# Patient Record
Sex: FEMALE | Race: WHITE | NOT HISPANIC OR LATINO | Employment: OTHER | URBAN - METROPOLITAN AREA
[De-identification: names, ages, dates, MRNs, and addresses within clinical notes are randomized per-mention and may not be internally consistent; named-entity substitution may affect disease eponyms.]

---

## 2017-01-10 ENCOUNTER — CONVERSION ENCOUNTER (OUTPATIENT)
Dept: MAMMOGRAPHY | Facility: HOSPITAL | Age: 72
End: 2017-01-10

## 2018-01-04 ENCOUNTER — OFFICE VISIT CONVERTED (OUTPATIENT)
Dept: FAMILY MEDICINE CLINIC | Facility: CLINIC | Age: 73
End: 2018-01-04
Attending: FAMILY MEDICINE

## 2018-01-04 ENCOUNTER — CONVERSION ENCOUNTER (OUTPATIENT)
Dept: FAMILY MEDICINE CLINIC | Facility: CLINIC | Age: 73
End: 2018-01-04

## 2018-03-30 ENCOUNTER — OFFICE VISIT CONVERTED (OUTPATIENT)
Dept: CARDIOLOGY | Facility: CLINIC | Age: 73
End: 2018-03-30
Attending: SPECIALIST

## 2018-04-13 ENCOUNTER — OFFICE VISIT CONVERTED (OUTPATIENT)
Dept: ORTHOPEDIC SURGERY | Facility: CLINIC | Age: 73
End: 2018-04-13
Attending: PHYSICIAN ASSISTANT

## 2018-04-20 ENCOUNTER — OFFICE VISIT CONVERTED (OUTPATIENT)
Dept: FAMILY MEDICINE CLINIC | Facility: CLINIC | Age: 73
End: 2018-04-20
Attending: FAMILY MEDICINE

## 2018-04-20 ENCOUNTER — CONVERSION ENCOUNTER (OUTPATIENT)
Dept: FAMILY MEDICINE CLINIC | Facility: CLINIC | Age: 73
End: 2018-04-20

## 2018-05-04 ENCOUNTER — OFFICE VISIT CONVERTED (OUTPATIENT)
Dept: FAMILY MEDICINE CLINIC | Facility: CLINIC | Age: 73
End: 2018-05-04
Attending: FAMILY MEDICINE

## 2018-07-18 ENCOUNTER — OFFICE VISIT CONVERTED (OUTPATIENT)
Dept: ORTHOPEDIC SURGERY | Facility: CLINIC | Age: 73
End: 2018-07-18
Attending: PHYSICIAN ASSISTANT

## 2018-08-06 ENCOUNTER — OFFICE VISIT CONVERTED (OUTPATIENT)
Dept: FAMILY MEDICINE CLINIC | Facility: CLINIC | Age: 73
End: 2018-08-06
Attending: FAMILY MEDICINE

## 2018-08-17 ENCOUNTER — CONVERSION ENCOUNTER (OUTPATIENT)
Dept: MAMMOGRAPHY | Facility: HOSPITAL | Age: 73
End: 2018-08-17

## 2018-09-24 ENCOUNTER — OFFICE VISIT (OUTPATIENT)
Dept: NEUROSURGERY | Facility: CLINIC | Age: 73
End: 2018-09-24

## 2018-09-24 VITALS
BODY MASS INDEX: 24.27 KG/M2 | HEIGHT: 63 IN | SYSTOLIC BLOOD PRESSURE: 132 MMHG | HEART RATE: 85 BPM | WEIGHT: 137 LBS | RESPIRATION RATE: 18 BRPM | DIASTOLIC BLOOD PRESSURE: 84 MMHG

## 2018-09-24 DIAGNOSIS — M53.3 PAIN OF RIGHT SACROILIAC JOINT: Primary | ICD-10-CM

## 2018-09-24 DIAGNOSIS — M51.36 DEGENERATIVE DISC DISEASE, LUMBAR: ICD-10-CM

## 2018-09-24 PROCEDURE — 99204 OFFICE O/P NEW MOD 45 MIN: CPT | Performed by: NURSE PRACTITIONER

## 2018-09-24 RX ORDER — DIAZEPAM 2 MG/1
TABLET ORAL
COMMUNITY
Start: 2018-08-23 | End: 2018-10-29

## 2018-09-24 RX ORDER — MULTIPLE VITAMINS W/ MINERALS TAB 9MG-400MCG
1 TAB ORAL DAILY
COMMUNITY
End: 2019-05-07 | Stop reason: HOSPADM

## 2018-09-24 RX ORDER — SIMVASTATIN 20 MG
20 TABLET ORAL NIGHTLY
COMMUNITY
Start: 2018-08-24

## 2018-09-24 RX ORDER — MELOXICAM 15 MG/1
15 TABLET ORAL DAILY
Qty: 30 TABLET | Refills: 2 | Status: SHIPPED | OUTPATIENT
Start: 2018-09-24 | End: 2019-03-21

## 2018-09-24 NOTE — PROGRESS NOTES
Subjective   Patient ID: Maureen Hernandez is a 73 y.o. female is being seen for consultation today at the request of Renu Alexis MD for back pain. Patient presents accompanied by her daughter.     History of Present Illness  Patient has history of prior lumbar fusion L2-S1 in [place] 2008. She presents for evaluation and treatment recommendations of right low back pain. She has had gradual onset of 4-5 years of right hip/low back pain. It has progressively worsened. It is a dull ache pain at all times with sharp stabbing pain intermittently. It gives her difficulty sleeping. Sitting is OK. She is using a crutch to keep weight off the leg. The pain is aggravated by walking and prolonged standing. She has pain that wraps around the groin and anterior thigh. It is not associated with numbness, weakness in leg especially in the last few months. Back is worst with activity but hip pain is worst with sleeping. She has chronic difficulty emptying her bladder. No recent PT. Hip injections with some benefit. No NSAIDs    The following portions of the patient's history were reviewed and updated as appropriate: allergies, current medications, past family history, past medical history, past social history, past surgical history and problem list.    Review of Systems   Constitutional: Negative for chills and fever.   HENT: Positive for tinnitus (bilateral ears, unchanged).    Eyes: Negative for visual disturbance.   Respiratory: Negative for cough, shortness of breath and wheezing.    Cardiovascular: Negative for chest pain and palpitations.   Gastrointestinal: Negative for abdominal pain, nausea and vomiting.   Genitourinary: Positive for difficulty urinating and urgency. Negative for enuresis.   Musculoskeletal: Positive for back pain and gait problem (many near falls).        Right hip pain, occ'l RLE pain   Skin: Negative for rash.   Neurological: Positive for tremors (right side, d/t palsy) and weakness (RLE). Negative  for numbness.   Psychiatric/Behavioral: Positive for sleep disturbance (only sleeps ~4 hours per night).       Objective   Physical Exam   Constitutional: She is oriented to person, place, and time. She appears well-developed and well-nourished.   Body mass index is 24.27 kg/m².     Pulmonary/Chest: Effort normal.   Musculoskeletal:        Right hip: She exhibits decreased range of motion and tenderness.        Left hip: She exhibits decreased range of motion. She exhibits no tenderness.        Lumbar back: She exhibits decreased range of motion (diffuse), tenderness (SI joint tenderness R>L) and pain (movement of right hip).   SI joint testing:  Positive for Taj, Gaenslen, compression, thigh thrust, Phyllis finger   Neurological: She is alert and oriented to person, place, and time. She has a normal Romberg Test.   Reflex Scores:       Patellar reflexes are 2+ on the right side and 2+ on the left side.       Achilles reflexes are 1+ on the right side and 1+ on the left side.  Vitals reviewed.    Neurologic Exam     Mental Status   Oriented to person, place, and time.   Level of consciousness: alert  Knowledge: good.     Motor Exam   Muscle bulk: normal    Strength   Strength 5/5 except as noted.   Right iliopsoas: 4/5    Sensory Exam   Right leg light touch: normal  Left leg light touch: normal  Right leg vibration: normal  Left leg vibration: normal  Temperature intact cold bilateral lower extremities     Gait, Coordination, and Reflexes     Gait  Gait: (antalgic; uses cane for stability)    Coordination   Romberg: negative    Reflexes   Right patellar: 2+  Left patellar: 2+  Right achilles: 1+  Left achilles: 1+  Able to heel and toe walk       Assessment/Plan   Independent Review of Radiographic Studies:    Lumbar and thoracic xrays 9/5/18- PO changes L2-S1 with instrumentation in good position and no evidence of hardware failure.  No lucency around screws.  Solid bony fusion laterally seen.  There are  degenerative changes at the L1/2 level.  Degenerative changes in the right SI joint    Hip xrays- 7/18/18- mild degenerative changes right hip joint    Medical Decision Making:    Patient presents for evaluation of right low back, hip, anterior thigh pain.  His been progressive and worse in the last few months.  It's giving her difficulty walking and sleeping.  She is not using a cane.  She has a sense of weakness.  She was been evaluated by orthopedic surgery.     Her exam is as noted above.  Antalgic gait, pain limiting strength in the right hip flexor otherwise normal.  Able to heel and toe walk.  Sensation intact.  X-rays show postoperative changes at L2 through S1.  There is degenerative changes at adjacent level of L1/2.  Mild degenerative changes in the right hip.  There are degenerative changes in the right SI joint as well.  SI joint testing positive for Taj, Gaenslen, compression, thigh thrust, Phyllis finger.    Patient appears to have hip versus SI joint pathology.  I do not think this is related to her adjacent level disease at L1/2 as that is a little higher than this level.  I recommended trial of NSAIDs and some physical therapy.  She has had problems with steroids giving her hyperglycemia.  She would probably benefit from an SI joint injection but we'll wait on this.  We'll see how she does with the conservative plan and consider MR imaging +/- SI joint injection at the next office visit if she has failed to improve.    Plan: Mobic trial and physical therapy.  Return to office one month.    Maureen was seen today for back pain.    Diagnoses and all orders for this visit:    Pain of right sacroiliac joint  -     Ambulatory Referral to Physical Therapy Evaluate and treat    Degenerative disc disease, lumbar  -     Ambulatory Referral to Physical Therapy Evaluate and treat    Other orders  -     meloxicam (MOBIC) 15 MG tablet; Take 1 tablet by mouth Daily.      Return in about 1 month (around  10/24/2018).

## 2018-10-11 ENCOUNTER — OFFICE VISIT CONVERTED (OUTPATIENT)
Dept: FAMILY MEDICINE CLINIC | Facility: CLINIC | Age: 73
End: 2018-10-11
Attending: FAMILY MEDICINE

## 2018-10-29 ENCOUNTER — OFFICE VISIT (OUTPATIENT)
Dept: NEUROSURGERY | Facility: CLINIC | Age: 73
End: 2018-10-29

## 2018-10-29 VITALS
HEART RATE: 87 BPM | BODY MASS INDEX: 25.16 KG/M2 | HEIGHT: 63 IN | DIASTOLIC BLOOD PRESSURE: 64 MMHG | RESPIRATION RATE: 18 BRPM | SYSTOLIC BLOOD PRESSURE: 120 MMHG | WEIGHT: 142 LBS

## 2018-10-29 DIAGNOSIS — M51.36 DEGENERATIVE DISC DISEASE, LUMBAR: ICD-10-CM

## 2018-10-29 DIAGNOSIS — M53.3 PAIN OF RIGHT SACROILIAC JOINT: Primary | ICD-10-CM

## 2018-10-29 PROCEDURE — 99213 OFFICE O/P EST LOW 20 MIN: CPT | Performed by: NURSE PRACTITIONER

## 2018-10-29 RX ORDER — GLIMEPIRIDE 1 MG/1
1 TABLET ORAL NIGHTLY
Status: ON HOLD | COMMUNITY
Start: 2018-10-11 | End: 2019-05-07 | Stop reason: SDUPTHER

## 2018-10-29 NOTE — PROGRESS NOTES
"Subjective   Patient ID: Maureen Hernandez is a 73 y.o. female is here today for follow-up back pain. Patient presents accompanied by her daughter.     History of Present Illness    Returns to the office today for follow-up of low back pain.  She was given a trial of no back and was referred to physical therapy at her last office visit.  She is a history of prior lumbar fusion at L2 through S1 and [place] in 2008.  She has had right-sided low back pain for the past 5 years.  She had lumbar and thoracic x-rays from September 2018 revealed postoperative changes as well as degenerative changes in the right SI joint.    Today, she reports that she has had a market improvement with the Mobic and PT.  She has also started off with therapy and she feels that this is helping.  Her pain is now to a 3 out of 10 on average, though still increases quickly with prolonged standing.  Walking does help.  She is trying to be more active.  She denies any new problems. She would like to refrain from surgery and any injections for as long as possible.  The cortisone injections increase her blood sugars to the point where they become hard to manage.    She presents with her daughter, who is also an established patient.        /64 (BP Location: Right arm, Patient Position: Sitting)   Pulse 87   Resp 18   Ht 160 cm (63\")   Wt 64.4 kg (142 lb)   BMI 25.15 kg/m²     The following portions of the patient's history were reviewed and updated as appropriate: allergies, current medications, past family history, past medical history, past social history, past surgical history and problem list.    Review of Systems   Genitourinary: Positive for enuresis (unchanged). Negative for difficulty urinating.   Musculoskeletal: Positive for back pain.        BLE pain   Neurological: Negative for weakness and numbness.   Psychiatric/Behavioral: Positive for sleep disturbance (improving).       Objective   Physical Exam   Constitutional: She is " oriented to person, place, and time. Vital signs are normal. She appears well-developed and well-nourished. She is cooperative.  Non-toxic appearance. She does not have a sickly appearance. She does not appear ill.   HENT:   Head: Normocephalic and atraumatic.   Eyes: Pupils are equal, round, and reactive to light. EOM are normal.   Corrective lenses   Neck: Neck supple. No tracheal deviation present.   Cardiovascular: Intact distal pulses.    Pulmonary/Chest: Effort normal.   Abdominal: Soft.   Musculoskeletal: She exhibits tenderness (Very tender to minimal palpation right SI joint). She exhibits no edema or deformity.        Lumbar back: She exhibits decreased range of motion, tenderness, bony tenderness and pain.   Decreased lumbar range of motion with extension secondary to pain  Strength equal bilateral lower extremities   Neurological: She is alert and oriented to person, place, and time. She has normal strength. She displays no tremor and normal reflexes. No cranial nerve deficit or sensory deficit. She exhibits normal muscle tone. Gait abnormal. Coordination normal. GCS eye subscore is 4. GCS verbal subscore is 5. GCS motor subscore is 6.   Reflex Scores:       Patellar reflexes are 2+ on the right side and 2+ on the left side.       Achilles reflexes are 2+ on the right side and 2+ on the left side.  Gait is slow, purposeful and antalgic on the right, outward deviation of the right foot  Sensory intact bilateral lower extremities   Skin: Skin is warm and dry.   Psychiatric: She has a normal mood and affect. Her behavior is normal. Thought content normal.   Vitals reviewed.    Neurologic Exam     Mental Status   Oriented to person, place, and time.     Cranial Nerves     CN III, IV, VI   Pupils are equal, round, and reactive to light.  Extraocular motions are normal.     Motor Exam     Strength   Strength 5/5 throughout.     Gait, Coordination, and Reflexes     Reflexes   Right patellar: 2+  Left patellar:  2+  Right achilles: 2+  Left achilles: 2+      Assessment/Plan   Independent Review of Radiographic Studies:    Physical therapy notes from [facility] reviewed      Medical Decision Making:    She returns the office today for ongoing follow-up of chronic low back and right SI joint pain.  Exam as noted above, no red flags.  She has had pretty significant improvement with physical therapy and meloxicam.  She is hopeful that this will continue to improve.  She is aware that being active is important with underlying arthritic and inflammatory pain.  I've encouraged her to walk and move as much as possible.  She is beginning aqua therapy and feels that this is very helpful.  Given her improvement, we will hold off on referral to pain management, any additional imaging and certainly any surgical discussion at this time.  As noted above, she is hesitant to get additional injections due to elevated blood sugars.  She is to call our office if her right SI joint pain worsens.  We will need additional imaging with an MRI at that time.    She will have her primary care provider take over meloxicam prescription.    Plan: Return to office as needed  Maureen was seen today for back pain.    Diagnoses and all orders for this visit:    Pain of right sacroiliac joint    Degenerative disc disease, lumbar      Return if symptoms worsen or fail to improve.

## 2018-11-06 ENCOUNTER — OFFICE VISIT CONVERTED (OUTPATIENT)
Dept: FAMILY MEDICINE CLINIC | Facility: CLINIC | Age: 73
End: 2018-11-06
Attending: FAMILY MEDICINE

## 2018-11-06 ENCOUNTER — CONVERSION ENCOUNTER (OUTPATIENT)
Dept: FAMILY MEDICINE CLINIC | Facility: CLINIC | Age: 73
End: 2018-11-06

## 2019-01-25 ENCOUNTER — HOSPITAL ENCOUNTER (OUTPATIENT)
Dept: FAMILY MEDICINE CLINIC | Facility: CLINIC | Age: 74
Discharge: HOME | End: 2019-01-25
Attending: FAMILY MEDICINE

## 2019-01-25 ENCOUNTER — OFFICE VISIT CONVERTED (OUTPATIENT)
Dept: FAMILY MEDICINE CLINIC | Facility: CLINIC | Age: 74
End: 2019-01-25
Attending: FAMILY MEDICINE

## 2019-01-25 LAB
ALBUMIN SERPL-MCNC: 4 G/DL (ref 3.5–5)
ALBUMIN/GLOB SERPL: 1.3 {RATIO} (ref 1.4–2.6)
ALP SERPL-CCNC: 59 U/L (ref 43–160)
ALT SERPL-CCNC: 21 U/L (ref 10–40)
ANION GAP SERPL CALC-SCNC: 22 MMOL/L (ref 8–19)
AST SERPL-CCNC: 18 U/L (ref 15–50)
BASOPHILS # BLD AUTO: 0.07 10*3/UL (ref 0–0.2)
BASOPHILS NFR BLD AUTO: 0.8 % (ref 0–3)
BILIRUB SERPL-MCNC: 0.23 MG/DL (ref 0.2–1.3)
BUN SERPL-MCNC: 25 MG/DL (ref 5–25)
BUN/CREAT SERPL: 26 {RATIO} (ref 6–20)
CALCIUM SERPL-MCNC: 10.3 MG/DL (ref 8.7–10.4)
CHLORIDE SERPL-SCNC: 100 MMOL/L (ref 99–111)
CONV CO2: 19 MMOL/L (ref 22–32)
CONV CREATININE URINE, RANDOM: 75.8 MG/DL (ref 10–300)
CONV MICROALBUM.,U,RANDOM: <12 MG/L (ref 0–20)
CONV TOTAL PROTEIN: 7.1 G/DL (ref 6.3–8.2)
CREAT UR-MCNC: 0.95 MG/DL (ref 0.5–0.9)
EOSINOPHIL # BLD AUTO: 0.08 10*3/UL (ref 0–0.7)
EOSINOPHIL # BLD AUTO: 0.97 % (ref 0–7)
ERYTHROCYTE [DISTWIDTH] IN BLOOD BY AUTOMATED COUNT: 11.4 % (ref 11.5–14.5)
EST. AVERAGE GLUCOSE BLD GHB EST-MCNC: 186 MG/DL
GFR SERPLBLD BASED ON 1.73 SQ M-ARVRAT: 60 ML/MIN/{1.73_M2}
GLOBULIN UR ELPH-MCNC: 3.1 G/DL (ref 2–3.5)
GLUCOSE SERPL-MCNC: 159 MG/DL (ref 65–99)
HBA1C MFR BLD: 14.6 G/DL (ref 12–16)
HBA1C MFR BLD: 8.1 % (ref 3.5–5.7)
HCT VFR BLD AUTO: 41.1 % (ref 37–47)
LYMPHOCYTES # BLD AUTO: 2.21 10*3/UL (ref 1–5)
MCH RBC QN AUTO: 33.2 PG (ref 27–31)
MCHC RBC AUTO-ENTMCNC: 35.6 G/DL (ref 33–37)
MCV RBC AUTO: 93.4 FL (ref 81–99)
MICROALBUMIN/CREAT UR: 15.8 MG/G{CRE} (ref 0–35)
MONOCYTES # BLD AUTO: 0.49 10*3/UL (ref 0.2–1.2)
MONOCYTES NFR BLD AUTO: 5.91 % (ref 3–10)
NEUTROPHILS # BLD AUTO: 5.48 10*3/UL (ref 2–8)
NEUTROPHILS NFR BLD AUTO: 65.8 % (ref 30–85)
NRBC BLD AUTO-RTO: 0 % (ref 0–0.01)
OSMOLALITY SERPL CALC.SUM OF ELEC: 290 MOSM/KG (ref 273–304)
PLATELET # BLD AUTO: 304 10*3/UL (ref 130–400)
PMV BLD AUTO: 7.7 FL (ref 7.4–10.4)
POTASSIUM SERPL-SCNC: 4.5 MMOL/L (ref 3.5–5.3)
RBC # BLD AUTO: 4.4 10*6/UL (ref 4.2–5.4)
SODIUM SERPL-SCNC: 136 MMOL/L (ref 135–147)
VARIANT LYMPHS NFR BLD MANUAL: 26.6 % (ref 20–45)
WBC # BLD AUTO: 8.32 10*3/UL (ref 4.8–10.8)

## 2019-01-30 ENCOUNTER — HOSPITAL ENCOUNTER (OUTPATIENT)
Dept: MRI IMAGING | Facility: HOSPITAL | Age: 74
Discharge: HOME | End: 2019-01-30
Attending: FAMILY MEDICINE

## 2019-03-08 ENCOUNTER — HOSPITAL ENCOUNTER (OUTPATIENT)
Dept: MAMMOGRAPHY | Facility: HOSPITAL | Age: 74
Discharge: HOME | End: 2019-03-08
Attending: FAMILY MEDICINE

## 2019-03-15 ENCOUNTER — HOSPITAL ENCOUNTER (OUTPATIENT)
Dept: FAMILY MEDICINE CLINIC | Facility: CLINIC | Age: 74
Discharge: HOME | End: 2019-03-15
Attending: FAMILY MEDICINE

## 2019-03-15 ENCOUNTER — OFFICE VISIT CONVERTED (OUTPATIENT)
Dept: FAMILY MEDICINE CLINIC | Facility: CLINIC | Age: 74
End: 2019-03-15
Attending: FAMILY MEDICINE

## 2019-03-15 ENCOUNTER — CONVERSION ENCOUNTER (OUTPATIENT)
Dept: FAMILY MEDICINE CLINIC | Facility: CLINIC | Age: 74
End: 2019-03-15

## 2019-03-17 LAB
AMOXICILLIN+CLAV SUSC ISLT: <=2
AMPICILLIN SUSC ISLT: >=32
AMPICILLIN+SULBAC SUSC ISLT: 8
BACTERIA UR CULT: ABNORMAL
CEFAZOLIN SUSC ISLT: <=4
CEFEPIME SUSC ISLT: <=1
CEFTAZIDIME SUSC ISLT: <=1
CEFTRIAXONE SUSC ISLT: <=1
CEFUROXIME ORAL SUSC ISLT: 2
CEFUROXIME PARENTER SUSC ISLT: 2
CIPROFLOXACIN SUSC ISLT: <=0.25
ERTAPENEM SUSC ISLT: <=0.5
GENTAMICIN SUSC ISLT: <=1
LEVOFLOXACIN SUSC ISLT: <=0.12
NITROFURANTOIN SUSC ISLT: 128
TETRACYCLINE SUSC ISLT: <=1
TMP SMX SUSC ISLT: <=20
TOBRAMYCIN SUSC ISLT: <=1

## 2019-03-21 ENCOUNTER — OFFICE VISIT (OUTPATIENT)
Dept: ORTHOPEDIC SURGERY | Facility: CLINIC | Age: 74
End: 2019-03-21

## 2019-03-21 VITALS — BODY MASS INDEX: 25.62 KG/M2 | WEIGHT: 144.6 LBS | TEMPERATURE: 99.7 F | HEIGHT: 63 IN

## 2019-03-21 DIAGNOSIS — M25.551 HIP PAIN, RIGHT: Primary | ICD-10-CM

## 2019-03-21 DIAGNOSIS — M16.11 PRIMARY OSTEOARTHRITIS OF RIGHT HIP: ICD-10-CM

## 2019-03-21 PROCEDURE — 73502 X-RAY EXAM HIP UNI 2-3 VIEWS: CPT | Performed by: ORTHOPAEDIC SURGERY

## 2019-03-21 PROCEDURE — 99204 OFFICE O/P NEW MOD 45 MIN: CPT | Performed by: ORTHOPAEDIC SURGERY

## 2019-03-21 RX ORDER — CEPHALEXIN 500 MG/1
CAPSULE ORAL
COMMUNITY
Start: 2019-03-15 | End: 2019-04-25

## 2019-03-21 RX ORDER — GABAPENTIN 100 MG/1
100 CAPSULE ORAL 3 TIMES DAILY
COMMUNITY
Start: 2019-02-09 | End: 2023-03-27 | Stop reason: SDUPTHER

## 2019-03-21 RX ORDER — VANCOMYCIN HYDROCHLORIDE 1 G/200ML
15 INJECTION, SOLUTION INTRAVENOUS ONCE
Status: CANCELLED | OUTPATIENT
Start: 2019-05-06 | End: 2019-03-21

## 2019-03-21 RX ORDER — ZOLPIDEM TARTRATE 10 MG/1
10 TABLET ORAL NIGHTLY
COMMUNITY
Start: 2019-03-15 | End: 2020-05-07

## 2019-03-21 RX ORDER — MELOXICAM 15 MG/1
15 TABLET ORAL ONCE
Status: CANCELLED | OUTPATIENT
Start: 2019-05-06 | End: 2019-03-21

## 2019-03-21 RX ORDER — TRAMADOL HYDROCHLORIDE 50 MG/1
50 TABLET ORAL EVERY 6 HOURS PRN
Status: ON HOLD | COMMUNITY
Start: 2019-03-15 | End: 2019-05-06 | Stop reason: SDUPTHER

## 2019-03-21 RX ORDER — PREGABALIN 75 MG/1
150 CAPSULE ORAL ONCE
Status: CANCELLED | OUTPATIENT
Start: 2019-05-06 | End: 2019-03-21

## 2019-03-21 RX ORDER — CEFAZOLIN SODIUM 2 G/100ML
2 INJECTION, SOLUTION INTRAVENOUS ONCE
Status: CANCELLED | OUTPATIENT
Start: 2019-05-06 | End: 2019-03-21

## 2019-03-21 NOTE — PROGRESS NOTES
"Patient: Maureen Hernandez  YOB: 1945 74 y.o. female  Medical Record Number: [id number]    Chief Complaints:   Chief Complaint   Patient presents with   • Right Hip - Establish Care, Pain       History of Present Illness:Maureen Hernandez is a 74 y.o. female who presents with complaints of severe constant right hip pain.  It has been present for 3 years and has markedly progressed to the point where she cannot perform basic activities of daily living.  She has a stabbing aching pain with any weightbearing.  She has trouble sleeping.  She has a history of lumbar spinal fusion and has been on narcotics but is recently weaned off of them.  The pain is still severe and constant    Allergies:   Allergies   Allergen Reactions   • Codeine Itching       Medications:   Current Outpatient Medications   Medication Sig Dispense Refill   • CBD (cannabidiol) oral oil Take  by mouth.     • cephalexin (KEFLEX) 500 MG capsule      • ciclopirox (PENLAC) 8 % solution      • gabapentin (NEURONTIN) 100 MG capsule      • glimepiride (AMARYL) 1 MG tablet      • metFORMIN (GLUCOPHAGE) 500 MG tablet      • Multiple Vitamins-Minerals (MULTIVITAMIN WITH MINERALS) tablet tablet Take 1 tablet by mouth Daily.     • simvastatin (ZOCOR) 20 MG tablet      • traMADol (ULTRAM) 50 MG tablet      • zolpidem (AMBIEN) 10 MG tablet        No current facility-administered medications for this visit.          The following portions of the patient's history were reviewed and updated as appropriate: allergies, current medications, past family history, past medical history, past social history, past surgical history and problem list.    Review of Systems:   A 14 point review of systems was performed. All systems negative except pertinent positives/negative listed in HPI above    Physical Exam:   Vitals:    03/21/19 1316   Temp: 99.7 °F (37.6 °C)   TempSrc: Temporal   Weight: 65.6 kg (144 lb 9.6 oz)   Height: 160 cm (63\")       General: A and O x 3, " ASA, NAD    SCLERA:    Normal    DENTITION:   Normal   Hip:  right    LEG ALIGNMENT:     Neutral        LEG LENGTH DISCREPANCY   :    right longer by 1 cm (pelvic obliquity)    GAIT:     Antalgic    SKIN:     No abnormality    RANGE OF MOTION:     Limited by joint irritability    STRENGTH:     Limited by joint irratibility    DISTAL PULSES:    Paplable    DISTAL SENSATION :   Intact    LYMPHATICS:     No   lymphadenopathy    OTHER:          +   Stinchfeld test      -    log roll      -   Tenderness to palpation trochanteric bursa       Radiology:  Xrays 2views right hip  (AP bilateral hips, and lateral of the hip) ordered and reviewed for evaluation of hip pain  demonstrating  advanced, end-satge osteoarthritis with bone on bone articluation, periarticular osteophytes, and subchondral cysts. There are no previous films for comparision.    Assessment/Plan: Right hip advanced OA.   Continuation of conservative management vs. BARBARA discussed.  The patient wishes to proceed with total hip replacement.  At this point the patient has failed the full gamut of conservative treatment and stating complete understanding of the risks/benefits/ anternatives wishes to proceed with surgical treatment.    Risk and benefits of surgery were reviewed.  Including, but not limited to, blood clots, anesthesia risk, infection, leg length discrepancy, fracture, skin/leg numbness, failure of the implant, need for future surgeries, continued pain, hematoma, need for transfusion, and death, among others.  The patient understands and wishes to proceed.     The spectrum of treatment options were discussed with the patient in detail including both the nonoperative and operative treatment modalities and their respective risks and benefits.  After thorough discussion, the patient has elected to undergo surgical treatment.  The details of the surgical procedure were explained including the location of probable incisions and a description of the  likely implants to be used.  Models and diagrams were used as educational resources. The patient understands the likely convalescence after surgery, as well as the rehabilitation required.  We thoroughly discussed the risks, benefits, and alternatives to surgery.  The risks include but are not limited to the risk of infection, joint stiffness, blood clots (including DVT and/or pulmonary embolus along with the risk of death), neurologic and/or vascular injury, fracture, dislocation, nonunion, malunion, need for further surgery including hardware failure requiring revision, and continued pain.  It was explained that if tissue has been repaired or reconstructed, there is also a chance of failure which may require further management.  Following the completion of the discussion, the patient expressed understanding of this planned course of care, all their questions were answered and consent will be obtained preoperatively.    Operative Plan: Anterior approach Total Hip Replacement an overnight staywith home health rehab          Estevan Sharma MD  3/21/2019

## 2019-03-26 ENCOUNTER — TELEPHONE (OUTPATIENT)
Dept: ORTHOPEDIC SURGERY | Facility: CLINIC | Age: 74
End: 2019-03-26

## 2019-03-26 NOTE — TELEPHONE ENCOUNTER
Patient is going to have hip surgery and wants to know what A1 C level is considered too high that RBB would postpone surgery?

## 2019-03-29 PROBLEM — M16.11 PRIMARY OSTEOARTHRITIS OF RIGHT HIP: Status: ACTIVE | Noted: 2019-03-29

## 2019-04-25 ENCOUNTER — APPOINTMENT (OUTPATIENT)
Dept: PREADMISSION TESTING | Facility: HOSPITAL | Age: 74
End: 2019-04-25

## 2019-04-25 VITALS
WEIGHT: 142 LBS | BODY MASS INDEX: 25.16 KG/M2 | RESPIRATION RATE: 16 BRPM | DIASTOLIC BLOOD PRESSURE: 73 MMHG | HEIGHT: 63 IN | SYSTOLIC BLOOD PRESSURE: 127 MMHG | HEART RATE: 67 BPM | TEMPERATURE: 96.9 F | OXYGEN SATURATION: 99 %

## 2019-04-25 DIAGNOSIS — M16.11 PRIMARY OSTEOARTHRITIS OF RIGHT HIP: ICD-10-CM

## 2019-04-25 LAB
ANION GAP SERPL CALCULATED.3IONS-SCNC: 12.5 MMOL/L
BILIRUB UR QL STRIP: NEGATIVE
BUN BLD-MCNC: 22 MG/DL (ref 8–23)
BUN/CREAT SERPL: 23.9 (ref 7–25)
CALCIUM SPEC-SCNC: 9.7 MG/DL (ref 8.6–10.5)
CHLORIDE SERPL-SCNC: 97 MMOL/L (ref 98–107)
CLARITY UR: CLEAR
CO2 SERPL-SCNC: 22.5 MMOL/L (ref 22–29)
COLOR UR: YELLOW
CREAT BLD-MCNC: 0.92 MG/DL (ref 0.57–1)
DEPRECATED RDW RBC AUTO: 43.4 FL (ref 37–54)
ERYTHROCYTE [DISTWIDTH] IN BLOOD BY AUTOMATED COUNT: 12.5 % (ref 12.3–15.4)
GFR SERPL CREATININE-BSD FRML MDRD: 60 ML/MIN/1.73
GLUCOSE BLD-MCNC: 233 MG/DL (ref 65–99)
GLUCOSE UR STRIP-MCNC: NEGATIVE MG/DL
HCT VFR BLD AUTO: 39.4 % (ref 34–46.6)
HGB BLD-MCNC: 13.3 G/DL (ref 12–15.9)
HGB UR QL STRIP.AUTO: NEGATIVE
KETONES UR QL STRIP: ABNORMAL
LEUKOCYTE ESTERASE UR QL STRIP.AUTO: NEGATIVE
MCH RBC QN AUTO: 31.9 PG (ref 26.6–33)
MCHC RBC AUTO-ENTMCNC: 33.8 G/DL (ref 31.5–35.7)
MCV RBC AUTO: 94.5 FL (ref 79–97)
NITRITE UR QL STRIP: NEGATIVE
PH UR STRIP.AUTO: 5.5 [PH] (ref 5–8)
PLATELET # BLD AUTO: 253 10*3/MM3 (ref 140–450)
PMV BLD AUTO: 9.9 FL (ref 6–12)
POTASSIUM BLD-SCNC: 4.3 MMOL/L (ref 3.5–5.2)
PROT UR QL STRIP: NEGATIVE
RBC # BLD AUTO: 4.17 10*6/MM3 (ref 3.77–5.28)
SODIUM BLD-SCNC: 132 MMOL/L (ref 136–145)
SP GR UR STRIP: 1.03 (ref 1–1.03)
UROBILINOGEN UR QL STRIP: ABNORMAL
WBC NRBC COR # BLD: 5.34 10*3/MM3 (ref 3.4–10.8)

## 2019-04-25 PROCEDURE — A9270 NON-COVERED ITEM OR SERVICE: HCPCS | Performed by: ORTHOPAEDIC SURGERY

## 2019-04-25 PROCEDURE — 85027 COMPLETE CBC AUTOMATED: CPT | Performed by: ORTHOPAEDIC SURGERY

## 2019-04-25 PROCEDURE — 36415 COLL VENOUS BLD VENIPUNCTURE: CPT

## 2019-04-25 PROCEDURE — 93010 ELECTROCARDIOGRAM REPORT: CPT | Performed by: INTERNAL MEDICINE

## 2019-04-25 PROCEDURE — 81003 URINALYSIS AUTO W/O SCOPE: CPT | Performed by: ORTHOPAEDIC SURGERY

## 2019-04-25 PROCEDURE — 93005 ELECTROCARDIOGRAM TRACING: CPT

## 2019-04-25 PROCEDURE — 80048 BASIC METABOLIC PNL TOTAL CA: CPT | Performed by: ORTHOPAEDIC SURGERY

## 2019-04-25 PROCEDURE — 63710000001 MUPIROCIN 2 % OINTMENT: Performed by: ORTHOPAEDIC SURGERY

## 2019-04-25 ASSESSMENT — HOOS JR
HOOS JR SCORE: 43.335
HOOS JR SCORE: 15

## 2019-05-02 ENCOUNTER — OFFICE VISIT (OUTPATIENT)
Dept: ORTHOPEDIC SURGERY | Facility: CLINIC | Age: 74
End: 2019-05-02

## 2019-05-02 VITALS
WEIGHT: 139 LBS | BODY MASS INDEX: 24.63 KG/M2 | HEIGHT: 63 IN | TEMPERATURE: 99.6 F | DIASTOLIC BLOOD PRESSURE: 70 MMHG | SYSTOLIC BLOOD PRESSURE: 110 MMHG

## 2019-05-02 DIAGNOSIS — M25.551 HIP PAIN, RIGHT: Primary | ICD-10-CM

## 2019-05-02 PROCEDURE — S0260 H&P FOR SURGERY: HCPCS | Performed by: NURSE PRACTITIONER

## 2019-05-02 NOTE — PROGRESS NOTES
Pre-Operative Orthopedic Assessment      Patient: Maureen Hernandez    YOB: 1945      Age/Gender: 74 y.o. female  Medical Record Number: [id number]  Surgical Procedure Planned: RIGHT TOTAL HIP ARTHROPLASTY ANTERIOR WITH HANA TABLE     Surgeon: Estevan Sharma MD    Date of Surgery Planned: 5/6/19    Question Value Score    Patient Score   1. What is your age group? 50-65 years  66-75 years  >75 years =2  =1  =0 1   2. Gender? Male  Female =2  =1 1   3. How far on average can you walk? (a block is 200 meters) Two blocks or more (+\- rest)  1-2 blocks (+\- rest)  Housebound (most of time) =2  =1  =0 1   4. Which gait aid to you use? (more often than not) None  Single-Point Stick  Crutches/Frame =2  =1  =0 1   5. Do you use community  supports? (home help, meals on wheels, district nursing) None or one per week  Two or more per week =1  =0 1   6. Will you live with someone who can care for you after your operation? Yes  No =3  =0 3      Your Score (out of 12)  8     Key Destination at Discharge from acute care predicted by score   Scores < 6  -- extended inpatient rehabilitation   Scores 6-9 -- additional intervention to discharge directly home (Rehabilitation in home)   Scores > 9 -- directly home         Discharge Disposition/Planning:     Patient Response   Discussed the Predicted discharge disposition needed based on RAPT Assessment with the patient.    Y     Patient selected discharge disposition:   HOME   Out Patient Rehabilitation Facility of Choice:    NONE   Home Health Services Preferred:   [facility]   Post-Operative Care Giver Name:  VANIA CARRION - [phone]     Subacute Inpatient Rehabilitation:  Complete this section only if planning inpatient services at a Subacute Facility     Patient Response   Subacute Facility Preferred (Please list 2 facilities:      Requires pre-certification for inpatient rehabilitation services?         Planned source of transportation to inpatient  rehabilitation facility?       If choosing inpatient services at an Acute or Subacute Facility please list a subsequent back-up plan (in case patient fails to qualify for inpatient rehabilitation). Back-up plans should include caregiver (family member or friend) for first 24-48 post- -operatively.       Home Equipment Patient Response   Does patient have a walker for home use?    Y   Does patient have a 3 in 1 commode for home use?    N   Does patient have a shower chair for home use?    Y   Does patient have an elevated commode seat for home use? Y   Does patient have a cane for home use?    Y   Is there any other medical equipment in the home? If so,  List in comment section below N   Pre-Operative Class Attendance Patient Response   Attended or scheduled to attend the pre-operative class within 1 year of total joint replacement? Y   Patient Education  Completed   Expected time of discharge discussed Y   Encouraged to attend Pre-Operative Class    Y   Education re: Predicted Discharge Disposition based on RAPT score    Y   Patient receptive and voiced understanding of information given    Y                                                                                                            Comments:    3 STEPS INTO HOUSE W/RAIL AND NONE INSIDE.     HAS ALL NEEDED EQUIPMENT.     PLANS TO USE [facility]/PT                                       Signature: Arnaldo Street LPN    Date:  5/2/2019

## 2019-05-02 NOTE — H&P (VIEW-ONLY)
Patient: Maureen Hernandez    Date of Admission: 5/6/19    YOB: 1945    Medical Record Number: [id number]    Admitting Physician: Dr. Estevan Sharma    Reason for Admission: End Stage Right Hip OA    History of Present Illness: 74 y.o. female presents with severe end stage hip osteoarthritis which has not been responsive to the full compliment of conservative measures. Despite conservative attempts, there is still severe, constant activity limiting hip pain. Given the severity of the pain, the patient has elected to proceed with hip replacement.    Allergies:   Allergies   Allergen Reactions   • Codeine Itching         Current Medications:  Scheduled Meds:  PRN Meds:.    PMH:  Past Medical History:   Diagnosis Date   • Arthritis    • DDD (degenerative disc disease), lumbar    • Diabetes (CMS/HCC)    • GERD (gastroesophageal reflux disease)    • Hyperlipidemia    • Palsy (CMS/HCC)    • Poor circulation    • Sleep apnea     NO CPAP USED   • Stress incontinence         PSURGH:  Past Surgical History:   Procedure Laterality Date   • CHOLECYSTECTOMY  1998   • HYSTERECTOMY     • KNEE SURGERY      meniscus   • KNEE SURGERY      meniscus   • LUMBAR FUSION  2013    Dr. Ibrahim       SocialHx:  Social History     Occupational History   • Occupation: retired teacher   Tobacco Use   • Smoking status: Never Smoker   • Smokeless tobacco: Never Used   Substance and Sexual Activity   • Alcohol use: No   • Drug use: No   • Sexual activity: Defer      Social History     Social History Narrative   • Not on file       FamHx:  Family History   Problem Relation Age of Onset   • Heart disease Mother    • Diabetes Father    • Heart disease Father    • Diabetes Sister    • Cancer Brother    • Malig Hyperthermia Neg Hx          Review of Systems:   A 14 point review of systems was performed, pertinent positives discussed above, all other systems are negative    Physical Exam: 74 y.o. female  Vital Signs :   Vitals:    05/02/19 1427  "  BP: 110/70   Temp: 99.6 °F (37.6 °C)   TempSrc: Temporal   Weight: 63 kg (139 lb)   Height: 158.8 cm (62.5\")     General: Alert and Oriented x 3, No acute distress.  Psych: mood and affect appropriate; recent and remote memory intact  Eyes: conjunctiva clear; pupils equally round and reactive, sclera nonicteric  CV: no peripheral edema  Resp: normal respiratory effort  Skin: no rashes or wounds; normal turgor  Musculosketetal; pain with hip range of motion. Positive stinchfeld test. No trochanteric  Tenderness.  Vascular: palpable distal pulses    Labs:    Appointment on 04/25/2019   Component Date Value Ref Range Status   • Color, UA 04/25/2019 Yellow  Yellow, Straw Final   • Appearance, UA 04/25/2019 Clear  Clear Final   • pH, UA 04/25/2019 5.5  5.0 - 8.0 Final   • Specific Gravity, UA 04/25/2019 1.028  1.005 - 1.030 Final   • Glucose, UA 04/25/2019 Negative  Negative Final   • Ketones, UA 04/25/2019 Trace* Negative Final   • Bilirubin, UA 04/25/2019 Negative  Negative Final   • Blood, UA 04/25/2019 Negative  Negative Final   • Protein, UA 04/25/2019 Negative  Negative Final   • Leuk Esterase, UA 04/25/2019 Negative  Negative Final   • Nitrite, UA 04/25/2019 Negative  Negative Final   • Urobilinogen, UA 04/25/2019 0.2 E.U./dL  0.2 - 1.0 E.U./dL Final   • Glucose 04/25/2019 233* 65 - 99 mg/dL Final   • BUN 04/25/2019 22  8 - 23 mg/dL Final   • Creatinine 04/25/2019 0.92  0.57 - 1.00 mg/dL Final   • Sodium 04/25/2019 132* 136 - 145 mmol/L Final   • Potassium 04/25/2019 4.3  3.5 - 5.2 mmol/L Final   • Chloride 04/25/2019 97* 98 - 107 mmol/L Final   • CO2 04/25/2019 22.5  22.0 - 29.0 mmol/L Final   • Calcium 04/25/2019 9.7  8.6 - 10.5 mg/dL Final   • eGFR Non African Amer 04/25/2019 60* >60 mL/min/1.73 Final   • BUN/Creatinine Ratio 04/25/2019 23.9  7.0 - 25.0 Final   • Anion Gap 04/25/2019 12.5  mmol/L Final   • WBC 04/25/2019 5.34  3.40 - 10.80 10*3/mm3 Final   • RBC 04/25/2019 4.17  3.77 - 5.28 10*6/mm3 Final   • " Hemoglobin 04/25/2019 13.3  12.0 - 15.9 g/dL Final   • Hematocrit 04/25/2019 39.4  34.0 - 46.6 % Final   • MCV 04/25/2019 94.5  79.0 - 97.0 fL Final   • MCH 04/25/2019 31.9  26.6 - 33.0 pg Final   • MCHC 04/25/2019 33.8  31.5 - 35.7 g/dL Final   • RDW 04/25/2019 12.5  12.3 - 15.4 % Final   • RDW-SD 04/25/2019 43.4  37.0 - 54.0 fl Final   • MPV 04/25/2019 9.9  6.0 - 12.0 fL Final   • Platelets 04/25/2019 253  140 - 450 10*3/mm3 Final     Xrays:  Xrays AP pelvis and a lateral of the Right hip were reviewed demonstrating  End stage hip OA with bone on bone articulation, subchondral cysts and periarticular osteophytes.    Assessment:  End-stage Right hip osteoarthritis. Conservative measures have failed.      Plan:  The plan is to proceed with Right Total Hip Replacement. The patient voiced understanding of the risks, benefits, and alternative forms of treatment that were discussed with Dr Sharma at the time of scheduling.  Patient's plan of going home with home health next day    Cece Gomez, APRN  5/2/2019

## 2019-05-02 NOTE — H&P
Patient: Maureen Hernandez    Date of Admission: 5/6/19    YOB: 1945    Medical Record Number: [id number]    Admitting Physician: Dr. Estevan Sharma    Reason for Admission: End Stage Right Hip OA    History of Present Illness: 74 y.o. female presents with severe end stage hip osteoarthritis which has not been responsive to the full compliment of conservative measures. Despite conservative attempts, there is still severe, constant activity limiting hip pain. Given the severity of the pain, the patient has elected to proceed with hip replacement.    Allergies:   Allergies   Allergen Reactions   • Codeine Itching         Current Medications:  Scheduled Meds:  PRN Meds:.    PMH:  Past Medical History:   Diagnosis Date   • Arthritis    • DDD (degenerative disc disease), lumbar    • Diabetes (CMS/HCC)    • GERD (gastroesophageal reflux disease)    • Hyperlipidemia    • Palsy (CMS/HCC)    • Poor circulation    • Sleep apnea     NO CPAP USED   • Stress incontinence         PSURGH:  Past Surgical History:   Procedure Laterality Date   • CHOLECYSTECTOMY  1998   • HYSTERECTOMY     • KNEE SURGERY      meniscus   • KNEE SURGERY      meniscus   • LUMBAR FUSION  2013    Dr. Ibrahim       SocialHx:  Social History     Occupational History   • Occupation: retired teacher   Tobacco Use   • Smoking status: Never Smoker   • Smokeless tobacco: Never Used   Substance and Sexual Activity   • Alcohol use: No   • Drug use: No   • Sexual activity: Defer      Social History     Social History Narrative   • Not on file       FamHx:  Family History   Problem Relation Age of Onset   • Heart disease Mother    • Diabetes Father    • Heart disease Father    • Diabetes Sister    • Cancer Brother    • Malig Hyperthermia Neg Hx          Review of Systems:   A 14 point review of systems was performed, pertinent positives discussed above, all other systems are negative    Physical Exam: 74 y.o. female  Vital Signs :   Vitals:    05/02/19 1427  "  BP: 110/70   Temp: 99.6 °F (37.6 °C)   TempSrc: Temporal   Weight: 63 kg (139 lb)   Height: 158.8 cm (62.5\")     General: Alert and Oriented x 3, No acute distress.  Psych: mood and affect appropriate; recent and remote memory intact  Eyes: conjunctiva clear; pupils equally round and reactive, sclera nonicteric  CV: no peripheral edema  Resp: normal respiratory effort  Skin: no rashes or wounds; normal turgor  Musculosketetal; pain with hip range of motion. Positive stinchfeld test. No trochanteric  Tenderness.  Vascular: palpable distal pulses    Labs:    Appointment on 04/25/2019   Component Date Value Ref Range Status   • Color, UA 04/25/2019 Yellow  Yellow, Straw Final   • Appearance, UA 04/25/2019 Clear  Clear Final   • pH, UA 04/25/2019 5.5  5.0 - 8.0 Final   • Specific Gravity, UA 04/25/2019 1.028  1.005 - 1.030 Final   • Glucose, UA 04/25/2019 Negative  Negative Final   • Ketones, UA 04/25/2019 Trace* Negative Final   • Bilirubin, UA 04/25/2019 Negative  Negative Final   • Blood, UA 04/25/2019 Negative  Negative Final   • Protein, UA 04/25/2019 Negative  Negative Final   • Leuk Esterase, UA 04/25/2019 Negative  Negative Final   • Nitrite, UA 04/25/2019 Negative  Negative Final   • Urobilinogen, UA 04/25/2019 0.2 E.U./dL  0.2 - 1.0 E.U./dL Final   • Glucose 04/25/2019 233* 65 - 99 mg/dL Final   • BUN 04/25/2019 22  8 - 23 mg/dL Final   • Creatinine 04/25/2019 0.92  0.57 - 1.00 mg/dL Final   • Sodium 04/25/2019 132* 136 - 145 mmol/L Final   • Potassium 04/25/2019 4.3  3.5 - 5.2 mmol/L Final   • Chloride 04/25/2019 97* 98 - 107 mmol/L Final   • CO2 04/25/2019 22.5  22.0 - 29.0 mmol/L Final   • Calcium 04/25/2019 9.7  8.6 - 10.5 mg/dL Final   • eGFR Non African Amer 04/25/2019 60* >60 mL/min/1.73 Final   • BUN/Creatinine Ratio 04/25/2019 23.9  7.0 - 25.0 Final   • Anion Gap 04/25/2019 12.5  mmol/L Final   • WBC 04/25/2019 5.34  3.40 - 10.80 10*3/mm3 Final   • RBC 04/25/2019 4.17  3.77 - 5.28 10*6/mm3 Final   • " Hemoglobin 04/25/2019 13.3  12.0 - 15.9 g/dL Final   • Hematocrit 04/25/2019 39.4  34.0 - 46.6 % Final   • MCV 04/25/2019 94.5  79.0 - 97.0 fL Final   • MCH 04/25/2019 31.9  26.6 - 33.0 pg Final   • MCHC 04/25/2019 33.8  31.5 - 35.7 g/dL Final   • RDW 04/25/2019 12.5  12.3 - 15.4 % Final   • RDW-SD 04/25/2019 43.4  37.0 - 54.0 fl Final   • MPV 04/25/2019 9.9  6.0 - 12.0 fL Final   • Platelets 04/25/2019 253  140 - 450 10*3/mm3 Final     Xrays:  Xrays AP pelvis and a lateral of the Right hip were reviewed demonstrating  End stage hip OA with bone on bone articulation, subchondral cysts and periarticular osteophytes.    Assessment:  End-stage Right hip osteoarthritis. Conservative measures have failed.      Plan:  The plan is to proceed with Right Total Hip Replacement. The patient voiced understanding of the risks, benefits, and alternative forms of treatment that were discussed with Dr Sharma at the time of scheduling.  Patient's plan of going home with home health next day    Cece Gomez, APRN  5/2/2019

## 2019-05-06 ENCOUNTER — HOSPITAL ENCOUNTER (INPATIENT)
Facility: HOSPITAL | Age: 74
LOS: 1 days | Discharge: HOME HEALTH | End: 2019-05-07
Attending: ORTHOPAEDIC SURGERY | Admitting: ORTHOPAEDIC SURGERY

## 2019-05-06 ENCOUNTER — APPOINTMENT (OUTPATIENT)
Dept: GENERAL RADIOLOGY | Facility: HOSPITAL | Age: 74
End: 2019-05-06

## 2019-05-06 ENCOUNTER — ANESTHESIA (OUTPATIENT)
Dept: PERIOP | Facility: HOSPITAL | Age: 74
End: 2019-05-06

## 2019-05-06 ENCOUNTER — ANESTHESIA EVENT (OUTPATIENT)
Dept: PERIOP | Facility: HOSPITAL | Age: 74
End: 2019-05-06

## 2019-05-06 DIAGNOSIS — M16.11 PRIMARY OSTEOARTHRITIS OF RIGHT HIP: ICD-10-CM

## 2019-05-06 PROBLEM — M25.551 HIP PAIN, RIGHT: Status: ACTIVE | Noted: 2019-05-06

## 2019-05-06 LAB
ABO GROUP BLD: NORMAL
BLD GP AB SCN SERPL QL: NEGATIVE
GLUCOSE BLDC GLUCOMTR-MCNC: 124 MG/DL (ref 70–130)
GLUCOSE BLDC GLUCOMTR-MCNC: 170 MG/DL (ref 70–130)
GLUCOSE BLDC GLUCOMTR-MCNC: 218 MG/DL (ref 70–130)
RH BLD: POSITIVE
T&S EXPIRATION DATE: NORMAL

## 2019-05-06 PROCEDURE — 25010000002 ONDANSETRON PER 1 MG: Performed by: NURSE ANESTHETIST, CERTIFIED REGISTERED

## 2019-05-06 PROCEDURE — 0SR90JA REPLACEMENT OF RIGHT HIP JOINT WITH SYNTHETIC SUBSTITUTE, UNCEMENTED, OPEN APPROACH: ICD-10-PCS | Performed by: ORTHOPAEDIC SURGERY

## 2019-05-06 PROCEDURE — 73501 X-RAY EXAM HIP UNI 1 VIEW: CPT

## 2019-05-06 PROCEDURE — 25010000002 PROPOFOL 10 MG/ML EMULSION: Performed by: NURSE ANESTHETIST, CERTIFIED REGISTERED

## 2019-05-06 PROCEDURE — 99222 1ST HOSP IP/OBS MODERATE 55: CPT | Performed by: INTERNAL MEDICINE

## 2019-05-06 PROCEDURE — 76000 FLUOROSCOPY <1 HR PHYS/QHP: CPT

## 2019-05-06 PROCEDURE — 25010000002 HYDROMORPHONE PER 4 MG: Performed by: NURSE ANESTHETIST, CERTIFIED REGISTERED

## 2019-05-06 PROCEDURE — 25010000002 VANCOMYCIN PER 500 MG: Performed by: ORTHOPAEDIC SURGERY

## 2019-05-06 PROCEDURE — 27130 TOTAL HIP ARTHROPLASTY: CPT | Performed by: NURSE PRACTITIONER

## 2019-05-06 PROCEDURE — 25010000002 FENTANYL CITRATE (PF) 100 MCG/2ML SOLUTION: Performed by: NURSE ANESTHETIST, CERTIFIED REGISTERED

## 2019-05-06 PROCEDURE — 25010000003 CEFAZOLIN IN DEXTROSE 2-4 GM/100ML-% SOLUTION: Performed by: NURSE PRACTITIONER

## 2019-05-06 PROCEDURE — C1776 JOINT DEVICE (IMPLANTABLE): HCPCS | Performed by: ORTHOPAEDIC SURGERY

## 2019-05-06 PROCEDURE — 25010000002 NEOSTIGMINE PER 0.5 MG: Performed by: NURSE ANESTHETIST, CERTIFIED REGISTERED

## 2019-05-06 PROCEDURE — 86850 RBC ANTIBODY SCREEN: CPT | Performed by: ORTHOPAEDIC SURGERY

## 2019-05-06 PROCEDURE — 25010000003 BUPIVACAINE LIPOSOME 1.3 % SUSPENSION 20 ML VIAL: Performed by: ORTHOPAEDIC SURGERY

## 2019-05-06 PROCEDURE — 25010000003 CEFAZOLIN IN DEXTROSE 2-4 GM/100ML-% SOLUTION: Performed by: ORTHOPAEDIC SURGERY

## 2019-05-06 PROCEDURE — 27130 TOTAL HIP ARTHROPLASTY: CPT | Performed by: ORTHOPAEDIC SURGERY

## 2019-05-06 PROCEDURE — 86900 BLOOD TYPING SEROLOGIC ABO: CPT | Performed by: ORTHOPAEDIC SURGERY

## 2019-05-06 PROCEDURE — C9290 INJ, BUPIVACAINE LIPOSOME: HCPCS | Performed by: ORTHOPAEDIC SURGERY

## 2019-05-06 PROCEDURE — 25010000002 KETOROLAC TROMETHAMINE PER 15 MG: Performed by: NURSE PRACTITIONER

## 2019-05-06 PROCEDURE — 86901 BLOOD TYPING SEROLOGIC RH(D): CPT | Performed by: ORTHOPAEDIC SURGERY

## 2019-05-06 PROCEDURE — 82962 GLUCOSE BLOOD TEST: CPT

## 2019-05-06 PROCEDURE — 25010000002 MIDAZOLAM PER 1 MG: Performed by: ANESTHESIOLOGY

## 2019-05-06 PROCEDURE — 25010000002 DEXAMETHASONE PER 1 MG: Performed by: NURSE ANESTHETIST, CERTIFIED REGISTERED

## 2019-05-06 DEVICE — IMPLANTABLE DEVICE: Type: IMPLANTABLE DEVICE | Site: HIP | Status: FUNCTIONAL

## 2019-05-06 DEVICE — OXINIUM FEMORAL HEAD 12/14 TAPER                                    32MM -3
Type: IMPLANTABLE DEVICE | Site: HIP | Status: FUNCTIONAL
Brand: OXINIUM

## 2019-05-06 DEVICE — REFLECTION SPHERICAL HEAD SCREW 25MM
Type: IMPLANTABLE DEVICE | Site: HIP | Status: FUNCTIONAL
Brand: REFLECTION

## 2019-05-06 DEVICE — R3 3 HOLE ACETABULAR SHELL 48MM
Type: IMPLANTABLE DEVICE | Site: HIP | Status: FUNCTIONAL
Brand: R3 ACETABULAR

## 2019-05-06 DEVICE — R3 0 DEGREE XLPE ACETABULAR LINER                                    32MM ID X OD 48MM
Type: IMPLANTABLE DEVICE | Site: HIP | Status: FUNCTIONAL
Brand: R3

## 2019-05-06 DEVICE — ANTHOLOGY STANDARD OFFSET POROUS                                    PLUS HA SIZE 2
Type: IMPLANTABLE DEVICE | Site: HIP | Status: FUNCTIONAL
Brand: ANTHOLOGY

## 2019-05-06 DEVICE — REFLECTION SPHERICAL HEAD SCREW 30MM
Type: IMPLANTABLE DEVICE | Site: HIP | Status: FUNCTIONAL
Brand: REFLECTION

## 2019-05-06 RX ORDER — POLYETHYLENE GLYCOL 3350 17 G/17G
17 POWDER, FOR SOLUTION ORAL 2 TIMES DAILY
Qty: 238 G | Refills: 0 | Status: SHIPPED | OUTPATIENT
Start: 2019-05-06 | End: 2019-05-14

## 2019-05-06 RX ORDER — LIDOCAINE HYDROCHLORIDE 20 MG/ML
INJECTION, SOLUTION INFILTRATION; PERINEURAL AS NEEDED
Status: DISCONTINUED | OUTPATIENT
Start: 2019-05-06 | End: 2019-05-06 | Stop reason: SURG

## 2019-05-06 RX ORDER — EPHEDRINE SULFATE 50 MG/ML
5 INJECTION, SOLUTION INTRAVENOUS ONCE AS NEEDED
Status: DISCONTINUED | OUTPATIENT
Start: 2019-05-06 | End: 2019-05-06 | Stop reason: HOSPADM

## 2019-05-06 RX ORDER — FLUMAZENIL 0.1 MG/ML
0.2 INJECTION INTRAVENOUS AS NEEDED
Status: DISCONTINUED | OUTPATIENT
Start: 2019-05-06 | End: 2019-05-06 | Stop reason: HOSPADM

## 2019-05-06 RX ORDER — DIPHENHYDRAMINE HCL 25 MG
25 CAPSULE ORAL
Status: DISCONTINUED | OUTPATIENT
Start: 2019-05-06 | End: 2019-05-06 | Stop reason: HOSPADM

## 2019-05-06 RX ORDER — PROMETHAZINE HYDROCHLORIDE 25 MG/ML
12.5 INJECTION, SOLUTION INTRAMUSCULAR; INTRAVENOUS ONCE AS NEEDED
Status: DISCONTINUED | OUTPATIENT
Start: 2019-05-06 | End: 2019-05-06 | Stop reason: HOSPADM

## 2019-05-06 RX ORDER — ONDANSETRON 4 MG/1
4 TABLET, FILM COATED ORAL EVERY 6 HOURS PRN
Status: DISCONTINUED | OUTPATIENT
Start: 2019-05-06 | End: 2019-05-07 | Stop reason: HOSPADM

## 2019-05-06 RX ORDER — FENTANYL CITRATE 50 UG/ML
INJECTION, SOLUTION INTRAMUSCULAR; INTRAVENOUS AS NEEDED
Status: DISCONTINUED | OUTPATIENT
Start: 2019-05-06 | End: 2019-05-06 | Stop reason: SURG

## 2019-05-06 RX ORDER — SODIUM CHLORIDE, SODIUM LACTATE, POTASSIUM CHLORIDE, CALCIUM CHLORIDE 600; 310; 30; 20 MG/100ML; MG/100ML; MG/100ML; MG/100ML
9 INJECTION, SOLUTION INTRAVENOUS CONTINUOUS
Status: DISCONTINUED | OUTPATIENT
Start: 2019-05-06 | End: 2019-05-06 | Stop reason: HOSPADM

## 2019-05-06 RX ORDER — GLYCOPYRROLATE 0.2 MG/ML
INJECTION INTRAMUSCULAR; INTRAVENOUS AS NEEDED
Status: DISCONTINUED | OUTPATIENT
Start: 2019-05-06 | End: 2019-05-06 | Stop reason: SURG

## 2019-05-06 RX ORDER — HYDROMORPHONE HCL 110MG/55ML
PATIENT CONTROLLED ANALGESIA SYRINGE INTRAVENOUS AS NEEDED
Status: DISCONTINUED | OUTPATIENT
Start: 2019-05-06 | End: 2019-05-06 | Stop reason: SURG

## 2019-05-06 RX ORDER — MELOXICAM 15 MG/1
15 TABLET ORAL ONCE
Status: COMPLETED | OUTPATIENT
Start: 2019-05-06 | End: 2019-05-06

## 2019-05-06 RX ORDER — MELOXICAM 15 MG/1
15 TABLET ORAL DAILY
Qty: 30 TABLET | Refills: 3 | Status: SHIPPED | OUTPATIENT
Start: 2019-05-06 | End: 2020-05-07

## 2019-05-06 RX ORDER — ONDANSETRON 2 MG/ML
4 INJECTION INTRAMUSCULAR; INTRAVENOUS EVERY 6 HOURS PRN
Status: DISCONTINUED | OUTPATIENT
Start: 2019-05-06 | End: 2019-05-07 | Stop reason: HOSPADM

## 2019-05-06 RX ORDER — HYDROCODONE BITARTRATE AND ACETAMINOPHEN 7.5; 325 MG/1; MG/1
1 TABLET ORAL EVERY 4 HOURS PRN
Status: DISCONTINUED | OUTPATIENT
Start: 2019-05-06 | End: 2019-05-07 | Stop reason: HOSPADM

## 2019-05-06 RX ORDER — PROMETHAZINE HYDROCHLORIDE 25 MG/1
25 TABLET ORAL ONCE AS NEEDED
Status: DISCONTINUED | OUTPATIENT
Start: 2019-05-06 | End: 2019-05-06 | Stop reason: HOSPADM

## 2019-05-06 RX ORDER — SODIUM CHLORIDE 0.9 % (FLUSH) 0.9 %
3-10 SYRINGE (ML) INJECTION AS NEEDED
Status: DISCONTINUED | OUTPATIENT
Start: 2019-05-06 | End: 2019-05-06 | Stop reason: HOSPADM

## 2019-05-06 RX ORDER — ONDANSETRON 2 MG/ML
INJECTION INTRAMUSCULAR; INTRAVENOUS AS NEEDED
Status: DISCONTINUED | OUTPATIENT
Start: 2019-05-06 | End: 2019-05-06 | Stop reason: SURG

## 2019-05-06 RX ORDER — MIDAZOLAM HYDROCHLORIDE 1 MG/ML
1 INJECTION INTRAMUSCULAR; INTRAVENOUS
Status: DISCONTINUED | OUTPATIENT
Start: 2019-05-06 | End: 2019-05-06 | Stop reason: HOSPADM

## 2019-05-06 RX ORDER — ROCURONIUM BROMIDE 10 MG/ML
INJECTION, SOLUTION INTRAVENOUS AS NEEDED
Status: DISCONTINUED | OUTPATIENT
Start: 2019-05-06 | End: 2019-05-06 | Stop reason: SURG

## 2019-05-06 RX ORDER — LABETALOL HYDROCHLORIDE 5 MG/ML
5 INJECTION, SOLUTION INTRAVENOUS
Status: DISCONTINUED | OUTPATIENT
Start: 2019-05-06 | End: 2019-05-06 | Stop reason: HOSPADM

## 2019-05-06 RX ORDER — FAMOTIDINE 10 MG/ML
20 INJECTION, SOLUTION INTRAVENOUS ONCE
Status: COMPLETED | OUTPATIENT
Start: 2019-05-06 | End: 2019-05-06

## 2019-05-06 RX ORDER — ACETAMINOPHEN 10 MG/ML
1000 INJECTION, SOLUTION INTRAVENOUS ONCE
Status: COMPLETED | OUTPATIENT
Start: 2019-05-06 | End: 2019-05-06

## 2019-05-06 RX ORDER — ACETAMINOPHEN 325 MG/1
650 TABLET ORAL ONCE AS NEEDED
Status: DISCONTINUED | OUTPATIENT
Start: 2019-05-06 | End: 2019-05-06 | Stop reason: HOSPADM

## 2019-05-06 RX ORDER — PANTOPRAZOLE SODIUM 40 MG/1
40 TABLET, DELAYED RELEASE ORAL DAILY
Qty: 14 TABLET | Refills: 0 | Status: SHIPPED | OUTPATIENT
Start: 2019-05-06 | End: 2019-05-21

## 2019-05-06 RX ORDER — PREGABALIN 75 MG/1
150 CAPSULE ORAL ONCE
Status: COMPLETED | OUTPATIENT
Start: 2019-05-06 | End: 2019-05-06

## 2019-05-06 RX ORDER — ASPIRIN 325 MG
325 TABLET, DELAYED RELEASE (ENTERIC COATED) ORAL EVERY 12 HOURS SCHEDULED
Status: DISCONTINUED | OUTPATIENT
Start: 2019-05-07 | End: 2019-05-07 | Stop reason: HOSPADM

## 2019-05-06 RX ORDER — MAGNESIUM HYDROXIDE 1200 MG/15ML
LIQUID ORAL AS NEEDED
Status: DISCONTINUED | OUTPATIENT
Start: 2019-05-06 | End: 2019-05-06 | Stop reason: HOSPADM

## 2019-05-06 RX ORDER — DOCUSATE SODIUM 100 MG/1
100 CAPSULE, LIQUID FILLED ORAL 2 TIMES DAILY
Status: DISCONTINUED | OUTPATIENT
Start: 2019-05-06 | End: 2019-05-07 | Stop reason: HOSPADM

## 2019-05-06 RX ORDER — KETOROLAC TROMETHAMINE 15 MG/ML
15 INJECTION, SOLUTION INTRAMUSCULAR; INTRAVENOUS EVERY 8 HOURS
Status: DISCONTINUED | OUTPATIENT
Start: 2019-05-06 | End: 2019-05-07 | Stop reason: HOSPADM

## 2019-05-06 RX ORDER — HYDROCODONE BITARTRATE AND ACETAMINOPHEN 7.5; 325 MG/1; MG/1
1 TABLET ORAL ONCE AS NEEDED
Status: COMPLETED | OUTPATIENT
Start: 2019-05-06 | End: 2019-05-06

## 2019-05-06 RX ORDER — DIPHENHYDRAMINE HYDROCHLORIDE 50 MG/ML
12.5 INJECTION INTRAMUSCULAR; INTRAVENOUS
Status: DISCONTINUED | OUTPATIENT
Start: 2019-05-06 | End: 2019-05-06 | Stop reason: HOSPADM

## 2019-05-06 RX ORDER — TRANEXAMIC ACID 100 MG/ML
INJECTION, SOLUTION INTRAVENOUS AS NEEDED
Status: DISCONTINUED | OUTPATIENT
Start: 2019-05-06 | End: 2019-05-06 | Stop reason: SURG

## 2019-05-06 RX ORDER — GABAPENTIN 100 MG/1
100 CAPSULE ORAL 3 TIMES DAILY
Status: DISCONTINUED | OUTPATIENT
Start: 2019-05-06 | End: 2019-05-07 | Stop reason: HOSPADM

## 2019-05-06 RX ORDER — CEFAZOLIN SODIUM 2 G/100ML
2 INJECTION, SOLUTION INTRAVENOUS ONCE
Status: COMPLETED | OUTPATIENT
Start: 2019-05-06 | End: 2019-05-06

## 2019-05-06 RX ORDER — ACETAMINOPHEN 500 MG
1000 TABLET ORAL EVERY 6 HOURS PRN
COMMUNITY
End: 2019-05-07 | Stop reason: HOSPADM

## 2019-05-06 RX ORDER — ACETAMINOPHEN 325 MG/1
325 TABLET ORAL EVERY 4 HOURS PRN
Status: DISCONTINUED | OUTPATIENT
Start: 2019-05-06 | End: 2019-05-07 | Stop reason: HOSPADM

## 2019-05-06 RX ORDER — ONDANSETRON 2 MG/ML
4 INJECTION INTRAMUSCULAR; INTRAVENOUS ONCE AS NEEDED
Status: DISCONTINUED | OUTPATIENT
Start: 2019-05-06 | End: 2019-05-06 | Stop reason: HOSPADM

## 2019-05-06 RX ORDER — HYDROCODONE BITARTRATE AND ACETAMINOPHEN 7.5; 325 MG/1; MG/1
2 TABLET ORAL EVERY 4 HOURS PRN
Status: DISCONTINUED | OUTPATIENT
Start: 2019-05-06 | End: 2019-05-07 | Stop reason: HOSPADM

## 2019-05-06 RX ORDER — GLIPIZIDE 5 MG/1
2.5 TABLET ORAL
Status: DISCONTINUED | OUTPATIENT
Start: 2019-05-07 | End: 2019-05-07 | Stop reason: HOSPADM

## 2019-05-06 RX ORDER — MIDAZOLAM HYDROCHLORIDE 1 MG/ML
2 INJECTION INTRAMUSCULAR; INTRAVENOUS
Status: DISCONTINUED | OUTPATIENT
Start: 2019-05-06 | End: 2019-05-06 | Stop reason: HOSPADM

## 2019-05-06 RX ORDER — MELOXICAM 15 MG/1
15 TABLET ORAL DAILY
Status: DISCONTINUED | OUTPATIENT
Start: 2019-05-07 | End: 2019-05-07 | Stop reason: HOSPADM

## 2019-05-06 RX ORDER — VANCOMYCIN HYDROCHLORIDE 1 G/200ML
15 INJECTION, SOLUTION INTRAVENOUS ONCE
Status: COMPLETED | OUTPATIENT
Start: 2019-05-06 | End: 2019-05-06

## 2019-05-06 RX ORDER — SODIUM CHLORIDE 0.9 % (FLUSH) 0.9 %
3 SYRINGE (ML) INJECTION EVERY 12 HOURS SCHEDULED
Status: DISCONTINUED | OUTPATIENT
Start: 2019-05-06 | End: 2019-05-06 | Stop reason: HOSPADM

## 2019-05-06 RX ORDER — OXYCODONE AND ACETAMINOPHEN 7.5; 325 MG/1; MG/1
1 TABLET ORAL ONCE AS NEEDED
Status: DISCONTINUED | OUTPATIENT
Start: 2019-05-06 | End: 2019-05-06 | Stop reason: HOSPADM

## 2019-05-06 RX ORDER — DEXAMETHASONE SODIUM PHOSPHATE 10 MG/ML
INJECTION INTRAMUSCULAR; INTRAVENOUS AS NEEDED
Status: DISCONTINUED | OUTPATIENT
Start: 2019-05-06 | End: 2019-05-06 | Stop reason: SURG

## 2019-05-06 RX ORDER — CEFAZOLIN SODIUM 2 G/100ML
2 INJECTION, SOLUTION INTRAVENOUS EVERY 8 HOURS
Status: COMPLETED | OUTPATIENT
Start: 2019-05-06 | End: 2019-05-07

## 2019-05-06 RX ORDER — PROMETHAZINE HYDROCHLORIDE 25 MG/1
25 SUPPOSITORY RECTAL ONCE AS NEEDED
Status: DISCONTINUED | OUTPATIENT
Start: 2019-05-06 | End: 2019-05-06 | Stop reason: HOSPADM

## 2019-05-06 RX ORDER — FENTANYL CITRATE 50 UG/ML
50 INJECTION, SOLUTION INTRAMUSCULAR; INTRAVENOUS
Status: DISCONTINUED | OUTPATIENT
Start: 2019-05-06 | End: 2019-05-06 | Stop reason: HOSPADM

## 2019-05-06 RX ORDER — HYDRALAZINE HYDROCHLORIDE 20 MG/ML
5 INJECTION INTRAMUSCULAR; INTRAVENOUS
Status: DISCONTINUED | OUTPATIENT
Start: 2019-05-06 | End: 2019-05-06 | Stop reason: HOSPADM

## 2019-05-06 RX ORDER — NALOXONE HCL 0.4 MG/ML
0.2 VIAL (ML) INJECTION AS NEEDED
Status: DISCONTINUED | OUTPATIENT
Start: 2019-05-06 | End: 2019-05-06 | Stop reason: HOSPADM

## 2019-05-06 RX ORDER — PROPOFOL 10 MG/ML
VIAL (ML) INTRAVENOUS AS NEEDED
Status: DISCONTINUED | OUTPATIENT
Start: 2019-05-06 | End: 2019-05-06 | Stop reason: SURG

## 2019-05-06 RX ORDER — TRAMADOL HYDROCHLORIDE 50 MG/1
50 TABLET ORAL EVERY 6 HOURS PRN
Qty: 40 TABLET | Refills: 0 | Status: SHIPPED | OUTPATIENT
Start: 2019-05-06 | End: 2020-05-07

## 2019-05-06 RX ORDER — HYDROMORPHONE HYDROCHLORIDE 1 MG/ML
0.5 INJECTION, SOLUTION INTRAMUSCULAR; INTRAVENOUS; SUBCUTANEOUS
Status: DISCONTINUED | OUTPATIENT
Start: 2019-05-06 | End: 2019-05-06 | Stop reason: HOSPADM

## 2019-05-06 RX ORDER — EPHEDRINE SULFATE 50 MG/ML
INJECTION, SOLUTION INTRAVENOUS AS NEEDED
Status: DISCONTINUED | OUTPATIENT
Start: 2019-05-06 | End: 2019-05-06 | Stop reason: SURG

## 2019-05-06 RX ORDER — OXYCODONE AND ACETAMINOPHEN 7.5; 325 MG/1; MG/1
1 TABLET ORAL EVERY 4 HOURS PRN
Qty: 50 TABLET | Refills: 0 | Status: SHIPPED | OUTPATIENT
Start: 2019-05-06 | End: 2020-05-07

## 2019-05-06 RX ADMIN — GLYCOPYRROLATE 0.4 MG: 0.2 INJECTION INTRAMUSCULAR; INTRAVENOUS at 16:36

## 2019-05-06 RX ADMIN — HYDROMORPHONE HYDROCHLORIDE 0.5 MG: 1 INJECTION, SOLUTION INTRAMUSCULAR; INTRAVENOUS; SUBCUTANEOUS at 18:09

## 2019-05-06 RX ADMIN — SODIUM CHLORIDE, POTASSIUM CHLORIDE, SODIUM LACTATE AND CALCIUM CHLORIDE 500 ML: 600; 310; 30; 20 INJECTION, SOLUTION INTRAVENOUS at 13:17

## 2019-05-06 RX ADMIN — PROPOFOL 200 MG: 10 INJECTION, EMULSION INTRAVENOUS at 14:36

## 2019-05-06 RX ADMIN — HYDROCODONE BITARTRATE AND ACETAMINOPHEN 1 TABLET: 7.5; 325 TABLET ORAL at 22:58

## 2019-05-06 RX ADMIN — HYDROCODONE BITARTRATE AND ACETAMINOPHEN 1 TABLET: 7.5; 325 TABLET ORAL at 19:11

## 2019-05-06 RX ADMIN — METFORMIN HYDROCHLORIDE 1000 MG: 1000 TABLET ORAL at 23:43

## 2019-05-06 RX ADMIN — MELOXICAM 15 MG: 15 TABLET ORAL at 13:32

## 2019-05-06 RX ADMIN — MIDAZOLAM 1 MG: 1 INJECTION INTRAMUSCULAR; INTRAVENOUS at 14:15

## 2019-05-06 RX ADMIN — CEFAZOLIN SODIUM 2 G: 2 INJECTION, SOLUTION INTRAVENOUS at 14:44

## 2019-05-06 RX ADMIN — MUPIROCIN 1 APPLICATION: 20 OINTMENT TOPICAL at 22:59

## 2019-05-06 RX ADMIN — TRANEXAMIC ACID 1000 MG: 100 INJECTION, SOLUTION INTRAVENOUS at 14:44

## 2019-05-06 RX ADMIN — KETOROLAC TROMETHAMINE 15 MG: 15 INJECTION, SOLUTION INTRAMUSCULAR; INTRAVENOUS at 22:59

## 2019-05-06 RX ADMIN — HYDROMORPHONE HYDROCHLORIDE 0.5 MG: 2 INJECTION INTRAMUSCULAR; INTRAVENOUS; SUBCUTANEOUS at 15:36

## 2019-05-06 RX ADMIN — HYDROMORPHONE HYDROCHLORIDE 0.5 MG: 1 INJECTION, SOLUTION INTRAMUSCULAR; INTRAVENOUS; SUBCUTANEOUS at 17:27

## 2019-05-06 RX ADMIN — GLYCOPYRROLATE 0.2 MG: 0.2 INJECTION INTRAMUSCULAR; INTRAVENOUS at 15:10

## 2019-05-06 RX ADMIN — LIDOCAINE HYDROCHLORIDE 40 MG: 20 INJECTION, SOLUTION INFILTRATION; PERINEURAL at 14:36

## 2019-05-06 RX ADMIN — EPHEDRINE SULFATE 10 MG: 50 INJECTION INTRAMUSCULAR; INTRAVENOUS; SUBCUTANEOUS at 16:02

## 2019-05-06 RX ADMIN — ROCURONIUM BROMIDE 10 MG: 10 INJECTION INTRAVENOUS at 15:36

## 2019-05-06 RX ADMIN — ACETAMINOPHEN 1000 MG: 10 INJECTION, SOLUTION INTRAVENOUS at 14:47

## 2019-05-06 RX ADMIN — VANCOMYCIN HYDROCHLORIDE 1000 MG: 1 INJECTION, SOLUTION INTRAVENOUS at 13:16

## 2019-05-06 RX ADMIN — ROCURONIUM BROMIDE 50 MG: 10 INJECTION INTRAVENOUS at 14:36

## 2019-05-06 RX ADMIN — PREGABALIN 150 MG: 75 CAPSULE ORAL at 13:32

## 2019-05-06 RX ADMIN — FAMOTIDINE 20 MG: 10 INJECTION INTRAVENOUS at 14:15

## 2019-05-06 RX ADMIN — ONDANSETRON 4 MG: 2 INJECTION INTRAMUSCULAR; INTRAVENOUS at 14:32

## 2019-05-06 RX ADMIN — SODIUM CHLORIDE, POTASSIUM CHLORIDE, SODIUM LACTATE AND CALCIUM CHLORIDE: 600; 310; 30; 20 INJECTION, SOLUTION INTRAVENOUS at 16:17

## 2019-05-06 RX ADMIN — GABAPENTIN 100 MG: 100 CAPSULE ORAL at 23:04

## 2019-05-06 RX ADMIN — CEFAZOLIN SODIUM 2 G: 2 INJECTION, SOLUTION INTRAVENOUS at 22:58

## 2019-05-06 RX ADMIN — EPHEDRINE SULFATE 10 MG: 50 INJECTION INTRAMUSCULAR; INTRAVENOUS; SUBCUTANEOUS at 15:06

## 2019-05-06 RX ADMIN — DEXAMETHASONE SODIUM PHOSPHATE 4 MG: 10 INJECTION INTRAMUSCULAR; INTRAVENOUS at 15:06

## 2019-05-06 RX ADMIN — TRANEXAMIC ACID 1000 MG: 100 INJECTION, SOLUTION INTRAVENOUS at 16:17

## 2019-05-06 RX ADMIN — SODIUM CHLORIDE, POTASSIUM CHLORIDE, SODIUM LACTATE AND CALCIUM CHLORIDE 9 ML/HR: 600; 310; 30; 20 INJECTION, SOLUTION INTRAVENOUS at 14:15

## 2019-05-06 RX ADMIN — NEOSTIGMINE METHYLSULFATE 2.5 MG: 1 INJECTION INTRAMUSCULAR; INTRAVENOUS; SUBCUTANEOUS at 16:36

## 2019-05-06 RX ADMIN — FENTANYL CITRATE 100 MCG: 50 INJECTION INTRAMUSCULAR; INTRAVENOUS at 15:15

## 2019-05-06 NOTE — PERIOPERATIVE NURSING NOTE
SPOKE WITH DR SHUN HOUSER BY PHONE. DISCUSSED WITH HIM PATIENT PROCEDURE TODAY, EPISODES OF BRADYCARDIA AT REST WITH HR TO 40, STABLE BP WITH RATE, ANESTHESIA REQUEST FOR POSSIBLE TELEMETRY BED POST OP. ORDER RECEIVED FOR CARDIOLOGY CONSULT

## 2019-05-06 NOTE — PERIOPERATIVE NURSING NOTE
SPOKE WITH CATH LAB LAWANDA HOWARD, DR ONEILL-CARDIOLOGY IN CATH LAB FINISHING PROCEDURE AT THIS TIME, WILL BE BY SHORTLY TO SEE PATIENT

## 2019-05-06 NOTE — DISCHARGE PLACEMENT REQUEST
"Lara Irwin (74 y.o. Female)     Date of Birth Social Security Number Address Home Phone MRN    1945  [address] [id number] [phone] [id number]    Sabianism Marital Status          Unknown        Admission Date Admission Type Admitting Provider Attending Provider Department, Room/Bed    5/6/19 Elective Estevan Sharma MD Brown, Reid B, MD [facility] MAIN OR, JE[facility] Main OR/MAIN OR    Discharge Date Discharge Disposition Discharge Destination                       Attending Provider:  Estevan Sharma MD    Allergies:  Codeine    Isolation:  None   Infection:  None   Code Status:  Not on file    Ht:  160 cm (63\")   Wt:  63.5 kg (139 lb 14.4 oz)    Admission Cmt:  None   Principal Problem:  Primary osteoarthritis of right hip [M16.11] More...                 Active Insurance as of 5/6/2019     Primary Coverage     Payor Plan Insurance Group Employer/Plan Group    MEDICARE MEDICARE A & B      Payor Plan Address Payor Plan Phone Number Payor Plan Fax Number Effective Dates    [address] [phone]  2/1/2012 - None Entered    [facility] [id number]       Subscriber Name Subscriber Birth Date Member ID       LARA IRWIN 1945 [id number]           Secondary Coverage     Payor Plan Insurance Group Employer/Plan Group    AETNA COMMERCIAL AETNA  [employer]      Payor Plan Address Payor Plan Phone Number Payor Plan Fax Number Effective Dates    [address] [phone]  1/1/2017 - None Entered    [facility] [id number]       Subscriber Name Subscriber Birth Date Member ID       LARA IRWIN 1945 [id number]                 Emergency Contacts      (Rel.) Home Phone Work Phone Mobile Phone    Ruth Sharma (Daughter) [phone] -- --              "

## 2019-05-06 NOTE — PERIOPERATIVE NURSING NOTE
SPOKE BRIEFLY WITH DR CARPENTER-ANESTHESIA AT BEDSIDE. DISCUSSED PATIENT HR TO 41-44  (SB) AT REST WHILE SLEEPING, TO MID 50s TO 60s WHEN AWOKEN. PATIENT  DENIES CP OR SOA, /61 WITH SAME RATE.    DR CARPENTER STATED PATIENT MAY REQUIRE TELEMETRY BED POST OPERATIVELY AND TO PLEASE DISCUSS WITH DR SHEIKH.

## 2019-05-06 NOTE — OP NOTE
Name: Maureen Hernandez  YOB: 1945    DATE OF SURGERY: 5/6/2019    PREOPERATIVE DIAGNOSIS: Right hip end-stage osteoarthritis    POSTOPERATIVE DIAGNOSIS: Right hip end-stage osteoarthritis    PROCEDURE PERFORMED: Right anterior total hip replacement    SURGEON: Estevan Sharma M.D.    ASSISTANT: DARRIN BRISCOE    IMPLANTS: [employer] Polar stem, R3 cup:  Implant Name Type Inv. Item Serial No.  Lot No. LRB No. Used   SHLL ACET R3 3H STD 48MM - [id number] Implant SHLL ACET R3 3H STD 48MM  [employer] [id number] Right 1   LINER ACET R3 XLPE 0D [id number] - [id number] Implant LINER ACET R3 XLPE 0D [id number]  [employer] [id number] Right 1   SCRW SPH HD REFLECTION 6.5X25MM - [id number] Implant SCRW SPH HD REFLECTION 6.5X25MM  [employer] [id number] Right 1   SCRW SPH HD REFLECTION 6.5X30MM - [id number] Implant SCRW SPH HD REFLECTION 6.5X30MM  [employer] [id number] Right 1   STEM FEM ANTHOLOGY POR STD OFFST HA SZ2 - [id number] Implant STEM FEM ANTHOLOGY POR STD OFFST HA SZ2  [employer] [id number] Right 1   HD FEM OXINIUM TPR 12 14 32MM MIN3 - [id number] Implant HD FEM OXINIUM TPR 12 14 32MM MIN3  [employer] [id number] Right 1       Estimated Blood Loss: 200cc  Specimens : none  Complications: none    DESCRIPTION OF PROCEDURE: The patient was taken to the operating room and placed in the supine position. A sequential compression device was carefully placed on the non-operative leg. Preoperative antibiotics were administered. Surgical time out was performed. After adequate induction of anesthesia, the feet were padded and placed in the [place] table boots. The patient ws then transferred onto the [place] table and positioned appropriately. C-arm image intensification was then used to take images of the pelvis and operative hip to be used for later comparison. The hip was then prepped and draped in the usual sterile fashion.   An incision was then made starting 2 cm lateral to the  "ASIS heading distal and lateral at approximately 30 degrees. The subcutaneous fat was then divided down to the fascia overlying the tensor fascia hawa (TFL) muscle. This was sharply divided staying a few cm lateral to the interval between the sartorius and the TFL muscle. This interval was then bluntly dissected. The circumflex vessels were then identified, cauterized, and divided. There was excellent hemostasis. Cobra retractors were then placed around the femoral neck capsule. The capsule was then divided using a \"T\" capsulotomy. The retractors were then placed intracapsular the the neck osteotomy was performed. A napkin ring neck fragment was then removed and then the head was removed using a corkscrew. There were end-stage arthritic findings.   The acetabulum was then exposed with \"number 7\" retractors. The labrum and pulvinar were excised. The starting reamer was then used to medialize the cup and then the acetabular reaming proceeded in 2mm increments. The cup was reamed line to line. The cup was then partially seated and c-arm was used to confirm the final cup position before final seating occurred. There was excellent position and stability of the cup.  The hip was then injected with anesthetic cocktail and the cup was anchored with 2 screws in the superior and posterior quadrants. The final liner was placed.   Attention was turned to the femur.Traction was removed form the hip and the leg was brought to the neutral position. The femoral elevator hook was placed. The leg was then moved to the external rotation, extension, and adduction position. Retractors were placed around the proximal femur and then the posterior capsule and conjoined tendon was the  Released. The box osteotome was then used to creat the starting hole. The femur was then prepared using the rat tail broach, followed by the chili-pepper broach and then we progressively broached up the final broach which fit nicely with excellent rotational " and axial stability. The hip was then reduced and c-arm images were taken which confirmed appropriate fit and position on the implants. There were no complicating factors noted, and flouro images were taken which confirmed proper restoration of leg length and offset. The trial components were removed, the hip was copiously irrigated and the final implants were then seated. C-arm images again confirmed appropriate anatomy restoration without complicating factors noted. The final head was then placed on a clean dry taper and the hip reduced.   The hip was then copiously irrigated.  There was excellent hemostasis. We placed a one-eighth inch Hemovac drain. We closed the hip in multiple layers in standard fashion. Sterile dressing were applied. At the end of the case, the sponge and needle counts were reported as being correct. There were no known complications. The patient was then transported to the recovery room.      Estevan Sharma M.D.  5/6/2019

## 2019-05-06 NOTE — PROGRESS NOTES
Discharge Planning Assessment  [facility]     Patient Name: Maureen Hernandez  MRN: [id number]  Today's Date: 5/6/2019    Admit Date: 5/6/2019    Discharge Needs Assessment     Row Name 05/06/19 1525       Living Environment    Lives With  alone    Current Living Arrangements  home/apartment/condo    Family Caregiver if Needed  child(cheryl), adult    Quality of Family Relationships  involved       Resource/Environmental Concerns    Resource/Environmental Concerns  none       Transition Planning    Patient/Family Anticipates Transition to  home with family    Patient/Family Anticipated Services at Transition  home health care       Discharge Needs Assessment    Readmission Within the Last 30 Days  no previous admission in last 30 days    Concerns to be Addressed  no discharge needs identified    Equipment Currently Used at Home  cane, quad    Discharge Facility/Level of Care Needs  home with home health        Discharge Plan     Row Name 05/06/19 1526       Plan    Plan  Home w/ family and [facility]     Patient/Family in Agreement with Plan  yes    Plan Comments  Facesheet and d/c plan verified per pre-op assessment (Arnaldo ACOSTA 5/2). Pt to d/c home w/ the help of her son, she requested [facility]. Referral given to Dorinda/[facility]. Pt is currently in surgery, CCP will follow progress after surgery.         Destination      No service coordination in this encounter.      Durable Medical Equipment      No service coordination in this encounter.      Dialysis/Infusion      No service coordination in this encounter.      Home Medical Care      Service Provider Request Status Selected Services Address Phone Number Fax Number    [facility] Pending - Request Sent N/A [address] [address] [phone]      Therapy      No service coordination in this encounter.      Community Resources      No service coordination in this encounter.          Demographic Summary    No  documentation.       Functional Status     Row Name 05/06/19 1525       Functional Status    Usual Activity Tolerance  good        Psychosocial    No documentation.       Abuse/Neglect    No documentation.       Legal    No documentation.       Substance Abuse    No documentation.       Patient Forms     Row Name 05/06/19 1526       Patient Forms    Provider Choice List  Delivered    Delivered to  Patient    Method of delivery  Telephone            Georgette Deleon RN

## 2019-05-06 NOTE — ANESTHESIA PREPROCEDURE EVALUATION
Anesthesia Evaluation     no history of anesthetic complications:               Airway   Mallampati: II  Neck ROM: full  no difficulty expected  Dental - normal exam     Pulmonary - normal exam   (+) sleep apnea,   (-) COPD, asthma, not a smoker    PE comment: nonlabored  Cardiovascular - normal exam    Rhythm: regular  Rate: normal    (+) hyperlipidemia,   (-) hypertension, valvular problems/murmurs, past MI, CAD, dysrhythmias, angina      Neuro/Psych  (+) tremors,     (-) seizures, TIA, CVA    ROS Comment: Palsy of R hand--slight tremor, genetic  GI/Hepatic/Renal/Endo    (+)  GERD well controlled,  diabetes mellitus type 2 well controlled,   (-) liver disease, no renal disease, hypothyroidism, hyperthyroidism    Musculoskeletal     (+) arthralgias,   Abdominal    Substance History      OB/GYN          Other   (+) arthritis                     Anesthesia Plan    ASA 3     general     intravenous induction   Anesthetic plan, all risks, benefits, and alternatives have been provided, discussed and informed consent has been obtained with: patient.

## 2019-05-06 NOTE — PERIOPERATIVE NURSING NOTE
HEART RATE TO 42-44 WHILE SLEEPING, MID 50s TO 60s WHEN AWAKE. DENIES CP OR SOA, BP AS NOTED, WILL MONITOR

## 2019-05-06 NOTE — ANESTHESIA PROCEDURE NOTES
Airway  Urgency: elective    Airway not difficult    General Information and Staff    Patient location during procedure: OR  Anesthesiologist: London Morris MD  CRNA: Rashida Perla CRNA    Indications and Patient Condition  Indications for airway management: airway protection    Preoxygenated: yes  Mask difficulty assessment: 1 - vent by mask    Final Airway Details  Final airway type: endotracheal airway      Successful airway: ETT  Cuffed: yes   Successful intubation technique: direct laryngoscopy  Endotracheal tube insertion site: oral  Blade: Bain  Blade size: 2  ETT size (mm): 7.0  Cormack-Lehane Classification: grade I - full view of glottis  Placement verified by: chest auscultation and capnometry   Cuff volume (mL): 7  Measured from: teeth  ETT to teeth (cm): 19  Number of attempts at approach: 1

## 2019-05-07 VITALS
TEMPERATURE: 97.3 F | HEART RATE: 64 BPM | OXYGEN SATURATION: 98 % | DIASTOLIC BLOOD PRESSURE: 50 MMHG | RESPIRATION RATE: 16 BRPM | BODY MASS INDEX: 24.79 KG/M2 | HEIGHT: 63 IN | WEIGHT: 139.9 LBS | SYSTOLIC BLOOD PRESSURE: 103 MMHG

## 2019-05-07 LAB
GLUCOSE BLDC GLUCOMTR-MCNC: 209 MG/DL (ref 70–130)
GLUCOSE BLDC GLUCOMTR-MCNC: 290 MG/DL (ref 70–130)
HBA1C MFR BLD: 7.6 % (ref 4.8–5.6)
HCT VFR BLD AUTO: 38.7 % (ref 34–46.6)
HGB BLD-MCNC: 12.9 G/DL (ref 12–15.9)

## 2019-05-07 PROCEDURE — 85014 HEMATOCRIT: CPT | Performed by: NURSE PRACTITIONER

## 2019-05-07 PROCEDURE — 97162 PT EVAL MOD COMPLEX 30 MIN: CPT | Performed by: PHYSICAL THERAPIST

## 2019-05-07 PROCEDURE — 82962 GLUCOSE BLOOD TEST: CPT

## 2019-05-07 PROCEDURE — 25010000002 ONDANSETRON PER 1 MG: Performed by: NURSE PRACTITIONER

## 2019-05-07 PROCEDURE — 97110 THERAPEUTIC EXERCISES: CPT | Performed by: PHYSICAL THERAPIST

## 2019-05-07 PROCEDURE — 85018 HEMOGLOBIN: CPT | Performed by: NURSE PRACTITIONER

## 2019-05-07 PROCEDURE — 63710000001 INSULIN LISPRO (HUMAN) PER 5 UNITS: Performed by: HOSPITALIST

## 2019-05-07 PROCEDURE — 83036 HEMOGLOBIN GLYCOSYLATED A1C: CPT | Performed by: HOSPITALIST

## 2019-05-07 PROCEDURE — 25010000002 KETOROLAC TROMETHAMINE PER 15 MG: Performed by: NURSE PRACTITIONER

## 2019-05-07 PROCEDURE — 25010000003 CEFAZOLIN IN DEXTROSE 2-4 GM/100ML-% SOLUTION: Performed by: NURSE PRACTITIONER

## 2019-05-07 RX ORDER — GLIMEPIRIDE 1 MG/1
2 TABLET ORAL NIGHTLY
Qty: 30 TABLET | Refills: 1 | Status: SHIPPED | OUTPATIENT
Start: 2019-05-07 | End: 2022-04-23

## 2019-05-07 RX ORDER — DEXTROSE MONOHYDRATE 25 G/50ML
25 INJECTION, SOLUTION INTRAVENOUS
Status: DISCONTINUED | OUTPATIENT
Start: 2019-05-07 | End: 2019-05-07 | Stop reason: HOSPADM

## 2019-05-07 RX ORDER — NICOTINE POLACRILEX 4 MG
15 LOZENGE BUCCAL
Status: DISCONTINUED | OUTPATIENT
Start: 2019-05-07 | End: 2019-05-07 | Stop reason: HOSPADM

## 2019-05-07 RX ADMIN — CEFAZOLIN SODIUM 2 G: 2 INJECTION, SOLUTION INTRAVENOUS at 06:46

## 2019-05-07 RX ADMIN — POLYETHYLENE GLYCOL 3350 17 G: 17 POWDER, FOR SOLUTION ORAL at 08:49

## 2019-05-07 RX ADMIN — ONDANSETRON 4 MG: 2 INJECTION INTRAMUSCULAR; INTRAVENOUS at 03:17

## 2019-05-07 RX ADMIN — MUPIROCIN 1 APPLICATION: 20 OINTMENT TOPICAL at 08:49

## 2019-05-07 RX ADMIN — METFORMIN HYDROCHLORIDE 1000 MG: 1000 TABLET ORAL at 08:48

## 2019-05-07 RX ADMIN — ASPIRIN 325 MG: 325 TABLET, COATED ORAL at 08:48

## 2019-05-07 RX ADMIN — DOCUSATE SODIUM 100 MG: 100 CAPSULE, LIQUID FILLED ORAL at 08:48

## 2019-05-07 RX ADMIN — GABAPENTIN 100 MG: 100 CAPSULE ORAL at 08:48

## 2019-05-07 RX ADMIN — HYDROCODONE BITARTRATE AND ACETAMINOPHEN 2 TABLET: 7.5; 325 TABLET ORAL at 03:18

## 2019-05-07 RX ADMIN — MELOXICAM 15 MG: 15 TABLET ORAL at 08:48

## 2019-05-07 RX ADMIN — KETOROLAC TROMETHAMINE 15 MG: 15 INJECTION, SOLUTION INTRAMUSCULAR; INTRAVENOUS at 06:45

## 2019-05-07 RX ADMIN — INSULIN LISPRO 6 UNITS: 100 INJECTION, SOLUTION INTRAVENOUS; SUBCUTANEOUS at 08:49

## 2019-05-07 RX ADMIN — GLIPIZIDE 2.5 MG: 5 TABLET ORAL at 06:45

## 2019-05-07 RX ADMIN — HYDROCODONE BITARTRATE AND ACETAMINOPHEN 1 TABLET: 7.5; 325 TABLET ORAL at 12:26

## 2019-05-07 NOTE — PLAN OF CARE
Problem: Patient Care Overview  Goal: Plan of Care Review  Outcome: Ongoing (interventions implemented as appropriate)   05/07/19 1951   Coping/Psychosocial   Plan of Care Reviewed With patient;son   Plan of Care Review   Progress improving   OTHER   Outcome Summary Pt is POD1 Right hip with IAN, worked with therapy today,pain controlled with po pain mediation, dressing CDI, All DC instructions reviewed with pt, all questions answered, IV DC'd, pt taken to DC via WC       Problem: Fall Risk (Adult)  Goal: Absence of Fall  Outcome: Outcome(s) achieved Date Met: 05/07/19

## 2019-05-07 NOTE — DISCHARGE PLACEMENT REQUEST
"Lara Irwin (74 y.o. Female)     Date of Birth Social Security Number Address Home Phone MRN    1945  [id number] [address] [phone] [id number]    Scientologist Marital Status          Unknown        Admission Date Admission Type Admitting Provider Attending Provider Department, Room/Bed    5/6/19 Elective Estevan Sharma MD Brown, Reid B, MD [address], P792/1    Discharge Date Discharge Disposition Discharge Destination         Home-Health Care [facility]              Attending Provider:  Estevan Sharma MD    Allergies:  Codeine    Isolation:  None   Infection:  None   Code Status:  CPR    Ht:  160 cm (63\")   Wt:  63.5 kg (139 lb 14.4 oz)    Admission Cmt:  None   Principal Problem:  Primary osteoarthritis of right hip [M16.11] More...                 Active Insurance as of 5/6/2019     Primary Coverage     Payor Plan Insurance Group Employer/Plan Group    MEDICARE MEDICARE A & B      Payor Plan Address Payor Plan Phone Number Payor Plan Fax Number Effective Dates    [address] [phone]  2/1/2012 - None Entered    [facility] [id number]       Subscriber Name Subscriber Birth Date Member ID       LARA IRWIN 1945 [id number]           Secondary Coverage     Payor Plan Insurance Group Employer/Plan Group    AETNA COMMERCIAL AETNA  [employer] SU[employer]      Payor Plan Address Payor Plan Phone Number Payor Plan Fax Number Effective Dates    [address] [phone]  1/1/2017 - None Entered    [facility] [id number]       Subscriber Name Subscriber Birth Date Member ID       LARA IRWIN 1945 [id number]                 Emergency Contacts      (Rel.) Home Phone Work Phone Mobile Phone    Ruth Sharma (Daughter) [phone] -- --              "

## 2019-05-07 NOTE — CONSULTS
Date of Hospital Visit: [unfilled]ENC@  Encounter Provider: Peewee Diego MD  Place of Service: [facility] CARDIOLOGY  Patient Name: Maureen Hernandez  :1945  Referral Provider: Estevan Sharma MD    Chief complaint  Asymptomatic bradycardia    History of Present Illness  74-year-old female presenting after right hip arthroplasty.  She was noted to have some asymptomatic bradycardia while leaping in recovery.  Denies any prior history of symptomatic bradycardia.  She did have one episode of syncope last year in [facility], but it was associated with nausea and vomiting.  She says she had extensive testing, and nothing concerning was found.  She reports prior to today she has been in her usual health been mostly limited by her hip pain.    Past Medical History:   Diagnosis Date   • Arthritis    • DDD (degenerative disc disease), lumbar    • Diabetes (CMS/[facility])    • GERD (gastroesophageal reflux disease)    • Hyperlipidemia    • Palsy (CMS/HCC)    • Poor circulation    • Sleep apnea     NO CPAP USED   • Stress incontinence        Past Surgical History:   Procedure Laterality Date   • CHOLECYSTECTOMY     • HYSTERECTOMY     • KNEE SURGERY      meniscus   • KNEE SURGERY      meniscus   • LUMBAR FUSION      Dr. Ibrahim       Medications Prior to Admission   Medication Sig Dispense Refill Last Dose   • acetaminophen (TYLENOL) 500 MG tablet Take 1,000 mg by mouth Every 6 (Six) Hours As Needed for Mild Pain .   2019 at 0900   • CBD (cannabidiol) oral oil Take  by mouth Daily. PT TO STOP PER MD INSTRUCTION   2019   • Chlorhexidine Gluconate (HIBICLENS EX) Apply 1 application topically Take As Directed. AS DIRECTED PREOP    2019 at 0900   • gabapentin (NEURONTIN) 100 MG capsule Take 100 mg by mouth 3 (Three) Times a Day.   2019 at 0800   • glimepiride (AMARYL) 1 MG tablet Take 1 mg by mouth Every Night.   2019 at 2200   • metFORMIN (GLUCOPHAGE) 1000 MG tablet Take  1,000 mg by mouth 2 (Two) Times a Day With Meals. TAKES 2 TABS TWICE PER DAY    5/6/2019 at 0800   • Multiple Vitamins-Minerals (MULTIVITAMIN WITH MINERALS) tablet tablet Take 1 tablet by mouth Daily. PT TO STOP PER MD INSTRUCTION   4/29/2019   • mupirocin (BACTROBAN) 2 % ointment 1 application into the nostril(s) as directed by provider Take As Directed. AS DIRECTED PREOP    5/6/2019 at 0800   • simvastatin (ZOCOR) 20 MG tablet Take 20 mg by mouth Every Night.   5/4/2019   • zolpidem (AMBIEN) 10 MG tablet Take 10 mg by mouth Every Night.   5/5/2019 at 2200   • [DISCONTINUED] traMADol (ULTRAM) 50 MG tablet Take 50 mg by mouth Every 6 (Six) Hours As Needed.   4/22/2019       Current Meds  Scheduled Meds:   Continuous Infusions:  lactated ringers 9 mL/hr Last Rate: 9 mL/hr (05/06/19 1415)     PRN Meds:.•  acetaminophen **OR** acetaminophen  •  diphenhydrAMINE  •  diphenhydrAMINE  •  ePHEDrine  •  fentanyl  •  flumazenil  •  hydrALAZINE  •  HYDROmorphone  •  labetalol  •  naloxone  •  ondansetron  •  oxyCODONE-acetaminophen  •  promethazine **OR** promethazine **OR** promethazine **OR** promethazine    Allergies as of 03/29/2019 - Reviewed 03/21/2019   Allergen Reaction Noted   • Codeine Itching 09/24/2018       Social History     Socioeconomic History   • Marital status:      Spouse name: Not on file   • Number of children: Not on file   • Years of education: Not on file   • Highest education level: Not on file   Occupational History   • Occupation: retired teacher   Tobacco Use   • Smoking status: Never Smoker   • Smokeless tobacco: Never Used   Substance and Sexual Activity   • Alcohol use: No   • Drug use: No   • Sexual activity: Defer       Family History   Problem Relation Age of Onset   • Heart disease Mother    • Diabetes Father    • Heart disease Father    • Diabetes Sister    • Cancer Brother    • Malig Hyperthermia Neg Hx        REVIEW OF SYSTEMS:   CONSTITUTIONAL: No weight loss, fever, chills,  "weakness or fatigue.   HEENT: Eyes: No visual loss, blurred vision, double vision or yellow sclerae. Ears, Nose, Throat: No hearing loss, sneezing, congestion, runny nose or sore throat.   SKIN: No rash or itching.     RESPIRATORY: No shortness of breath, hemoptysis, cough or sputum.   GASTROINTESTINAL: No anorexia, nausea, vomiting or diarrhea. No abdominal pain, bright red blood per rectum or melena.  GENITOURINARY: No burning on urination, hematuria or increased frequency.  NEUROLOGICAL: No headache, dizziness, syncope, paralysis, ataxia, numbness or tingling in the extremities. No change in bowel or bladder control.   MUSCULOSKELETAL: No muscle, back pain, positive joint pain and stiffness.   HEMATOLOGIC: No anemia, bleeding or bruising.   LYMPHATICS: No enlarged nodes. No history of splenectomy.   PSYCHIATRIC: No history of depression, anxiety, hallucinations.   ENDOCRINOLOGIC: No reports of sweating, cold or heat intolerance. No polyuria or polydipsia.        Objective:   Temp:  [97.5 °F (36.4 °C)-98.6 °F (37 °C)] 98.6 °F (37 °C)  Heart Rate:  [43-88] 62  Resp:  [12-20] 16  BP: (128-163)/(53-95) 145/70  Body mass index is 24.78 kg/m².  Flowsheet Rows      First Filed Value   Admission Height  160 cm (63\") Documented at 05/06/2019 1309   Admission Weight  63.5 kg (139 lb 14.4 oz) Documented at 05/06/2019 1309        Vitals:    05/06/19 2000   BP: 145/70   Pulse: 62   Resp: 16   Temp: 98.6 °F (37 °C)   SpO2: 99%       General Appearance:    Alert, cooperative, in no acute distress   Head:    Normocephalic, without obvious abnormality, atraumatic   Eyes:            Lids and lashes normal, conjunctivae and sclerae normal, no   icterus, no pallor, corneas clear, PERRLA   Ears:    Ears appear intact with no abnormalities noted   Throat:   No oral lesions, no thrush, oral mucosa moist   Neck:   No adenopathy, supple, trachea midline, no thyromegaly, no   carotid bruit, no JVD       Lungs:     Clear to " auscultation,respirations regular, even and unlabored    Heart:    Regular rhythm and normal rate, normal S1 and S2, no murmur, no gallop, no rub, no click   Chest Wall:    No abnormalities observed   Abdomen:     Normal bowel sounds, no masses, no organomegaly, soft        non-tender, non-distended, no guarding, no rebound  tenderness           Skin:  Psychiatric:   No bleeding, bruising or rash    Alert and oriented x 3, normal mood and affect                 Lab Review:          Invalid input(s): EUGENIA JOSE      @[email or web address]@              @LAB[facility](chol,trig,hdl,ldl)    I personally viewed and interpreted the patient's EKG/Telemetry tracing.    Demonstrates sinus rhythm.  The patient has a telemetry strip which shows sinus bradycardia but normal conduction.  )  Patient Active Problem List   Diagnosis   • Pain of right sacroiliac joint   • Degenerative disc disease, lumbar   • Primary osteoarthritis of right hip   • Hip pain, right     Assessment and Plan:  1.  Asymptomatic sinus bradycardia    Patient had mild asymptomatic sinus bradycardia.  No evidence of any conduction abnormalities.  No evidence of any acute cardiac process.  She does not require any further cardiac evaluation, and I think she will be fine for her to go to the normal [facility] surge floor for recovery from her hip operation.  If new issues arise please feel free to contact us.    Peewee Diego MD  05/06/19  8:34 PM.  Time spent in reviewing chart, discussion and examination: 30 minutes

## 2019-05-07 NOTE — PROGRESS NOTES
Orthopedic Total Hip Progress Note      Patient: Maureen Hernandez  Date of Admission: 5/6/2019  YOB: 1945  Medical Record Number: [id number]    POD # :  1 Day Post-Op Procedure(s) (LRB):  RIGHT TOTAL HIP ARTHROPLASTY ANTERIOR WITH HANA TABLE (Right)    Systemic or Specific Complaints: No Complaints    Pain Relief: complete resolution    Physical Exam:  74 y.o.  female  Vitals:  Temp:  [96.8 °F (36 °C)-98.6 °F (37 °C)] 97.1 °F (36.2 °C)  Heart Rate:  [43-88] 56  Resp:  [12-20] 16  BP: ()/(53-95) 91/53  alert and oriented  Chest: Clear to auscultation  CV: Regular Rate and Rhythm  Abd: Soft, NT, with BS +  Ext: NV intact. ROM appropriate. Calf is soft and nontender. Negative Homans Sn  Skin: Incision clean dry and intact w/out signs or  symptoms of infection.    Activity: Mobilizing Per P.T.   Weight Bearing: As Tolerated    Data Review     Admission on 05/06/2019   Component Date Value Ref Range Status   • ABO Type 05/06/2019 O   Final   • RH type 05/06/2019 Positive   Final   • Antibody Screen 05/06/2019 Negative   Final   • T&S Expiration Date 05/06/2019 5/9/2019 11:59:59 PM   Final   • Glucose 05/06/2019 124  70 - 130 mg/dL Final   • Glucose 05/06/2019 170* 70 - 130 mg/dL Final   • Glucose 05/06/2019 218* 70 - 130 mg/dL Final   • Hemoglobin 05/07/2019 12.9  12.0 - 15.9 g/dL Final   • Hematocrit 05/07/2019 38.7  34.0 - 46.6 % Final   • Glucose 05/07/2019 290* 70 - 130 mg/dL Final       No results found.    Medications:    aspirin 325 mg Oral Q12H   docusate sodium 100 mg Oral BID   gabapentin 100 mg Oral TID   glipiZIDE 2.5 mg Oral QAM AC   ketorolac 15 mg Intravenous Q8H   meloxicam 15 mg Oral Daily   metFORMIN 1,000 mg Oral BID With Meals   mupirocin  Each Nare BID   polyethylene glycol 17 g Oral BID     •  acetaminophen  •  HYDROcodone-acetaminophen  •  HYDROcodone-acetaminophen  •  ondansetron **OR** ondansetron    Assessment:  Doing well POD  # 1 Day Post-Op Procedure(s) (LRB):  RIGHT TOTAL  HIP ARTHROPLASTY ANTERIOR WITH HANA TABLE (Right)  Problem List Items Addressed This Visit        Musculoskeletal and Integument    * (Principal) Primary osteoarthritis of right hip    Relevant Medications    lactated ringers bolus 500 mL (Completed)    ceFAZolin in dextrose (ANCEF) IVPB solution 2 g (Completed)    meloxicam (MOBIC) tablet 15 mg (Completed)    pregabalin (LYRICA) capsule 150 mg (Completed)    vancomycin (VANCOCIN) in iso-osmotic dextrose IVPB 1 g (premix) 200 mL (Completed)          Plan:  NO hip precautions  Continue efforts to mobilize  Continue Pain Control Measures  Continue incisional Care  DVT prophylaxis  Elevated Bld GLC  -will obtain [facility] consult -  May be due to ginger ale. Will follow and await [facility] recs, home possibly today /  tomorrow    Discharge Plan:Homewhen medically ready    Estevan Sharma MD    Date: 5/7/2019  Time: 7:25 AM

## 2019-05-07 NOTE — PERIOPERATIVE NURSING NOTE
DR ONEILL AT BEDSIDE, SPEAKING WITH PATIENT.  DR ONEILL STATED NO TELEMETRY BED NECESSARY FROM HIS STANDPOINT, ORTHO/MED SURG BED OK POST OP

## 2019-05-07 NOTE — PLAN OF CARE
Problem: Patient Care Overview  Goal: Individualization and Mutuality  Outcome: Ongoing (interventions implemented as appropriate)    Goal: Discharge Needs Assessment  Outcome: Ongoing (interventions implemented as appropriate)    Goal: Interprofessional Rounds/Family Conf  Outcome: Ongoing (interventions implemented as appropriate)      Problem: Hip Arthroplasty (Total, Partial) (Adult)  Goal: Signs and Symptoms of Listed Potential Problems Will be Absent, Minimized or Managed (Hip Arthroplasty)  Outcome: Ongoing (interventions implemented as appropriate)    Goal: Anesthesia/Sedation Recovery  Outcome: Ongoing (interventions implemented as appropriate)      Problem: Fall Risk (Adult)  Goal: Identify Related Risk Factors and Signs and Symptoms  Outcome: Outcome(s) achieved Date Met: 05/07/19    Goal: Absence of Fall  Outcome: Ongoing (interventions implemented as appropriate)

## 2019-05-07 NOTE — PLAN OF CARE
Problem: Patient Care Overview  Goal: Plan of Care Review  Outcome: Ongoing (interventions implemented as appropriate)   05/07/19 5576   Coping/Psychosocial   Plan of Care Reviewed With patient   Plan of Care Review   Progress improving   OTHER   Outcome Summary vss, mathieu dsg c/d/i, neurovascular assess --rt foot dorsiflexion sl weaker but pt states has hx of thi, voiding, vomited x2, reports fair pain control, ambulated in hallway, educated on importance of monitoring BG due to hx diabetes, possible discharge today , will continue to monitor

## 2019-05-07 NOTE — CONSULTS
[facility]  [phone]    CONSULT NOTE    INTERNAL MEDICINE   [facility]     Referring Provider: Dr Sharma  Reason for Consultation: Hyperglycemia      Chief complaint:  Hip pain and high blood sugars    Subjective .     History of present illness: Is a very pleasant 74-year-old female with a history of diabetes and osteoarthritis who presented to the hospital for elective total hip arthroplasty.  She had surgery yesterday tolerated it well and postoperatively has had a few elevated blood sugars.  Her blood sugar this morning was over 250 and we were consulted to evaluate.  At home she takes metformin and Amaryl.  She is been on these medications since January.  In January her A1c was over 8 so they doubled her metformin.  She has not had a repeat A1c since then.  She denies any symptoms of hypoglycemia at home.  Her blood sugars have been fairly stable with none over 250 at home.  She checks her blood sugar regularly follows up with the eye doctor and the foot doctor is recommended.  Her blood pressures under excellent control currently a little on the low side after surgery but not unexpected.    Review of Systems  The following systems were reviewed and negative;  constitution, eyes, ENT, respiratory, cardiovascular, gastrointestinal, genitourinary, integument, hematologic / lymphatic, musculoskeletal, neurological, behavioral/psych, endocrine and allergies / immunologic    Past Medical History:   Diagnosis Date   • Arthritis    • DDD (degenerative disc disease), lumbar    • Diabetes (CMS/HCC)    • GERD (gastroesophageal reflux disease)    • Hyperlipidemia    • Palsy (CMS/HCC)    • Poor circulation    • Sleep apnea     NO CPAP USED   • Stress incontinence      Past Surgical History:   Procedure Laterality Date   • CHOLECYSTECTOMY  1998   • HYSTERECTOMY     • KNEE SURGERY      meniscus   • KNEE SURGERY      meniscus   • LUMBAR FUSION  2013    Dr. Alexandria Noland  History   Problem Relation Age of Onset   • Heart disease Mother    • Diabetes Father    • Heart disease Father    • Diabetes Sister    • Cancer Brother    • Malig Hyperthermia Neg Hx      Social History     Tobacco Use   • Smoking status: Never Smoker   • Smokeless tobacco: Never Used   Substance Use Topics   • Alcohol use: No   • Drug use: No     Medications Prior to Admission   Medication Sig Dispense Refill Last Dose   • acetaminophen (TYLENOL) 500 MG tablet Take 1,000 mg by mouth Every 6 (Six) Hours As Needed for Mild Pain .   5/5/2019 at 0900   • CBD (cannabidiol) oral oil Take  by mouth Daily. PT TO STOP PER MD INSTRUCTION   4/29/2019   • Chlorhexidine Gluconate (HIBICLENS EX) Apply 1 application topically Take As Directed. AS DIRECTED PREOP    5/6/2019 at 0900   • gabapentin (NEURONTIN) 100 MG capsule Take 100 mg by mouth 3 (Three) Times a Day.   5/6/2019 at 0800   • metFORMIN (GLUCOPHAGE) 1000 MG tablet Take 1,000 mg by mouth 2 (Two) Times a Day With Meals. TAKES 2 TABS TWICE PER DAY    5/6/2019 at 0800   • Multiple Vitamins-Minerals (MULTIVITAMIN WITH MINERALS) tablet tablet Take 1 tablet by mouth Daily. PT TO STOP PER MD INSTRUCTION   4/29/2019   • mupirocin (BACTROBAN) 2 % ointment 1 application into the nostril(s) as directed by provider Take As Directed. AS DIRECTED PREOP    5/6/2019 at 0800   • simvastatin (ZOCOR) 20 MG tablet Take 20 mg by mouth Every Night.   5/4/2019   • zolpidem (AMBIEN) 10 MG tablet Take 10 mg by mouth Every Night.   5/5/2019 at 2200   • [DISCONTINUED] glimepiride (AMARYL) 1 MG tablet Take 1 mg by mouth Every Night.   5/5/2019 at 2200   • [DISCONTINUED] traMADol (ULTRAM) 50 MG tablet Take 50 mg by mouth Every 6 (Six) Hours As Needed.   4/22/2019       aspirin 325 mg Oral Q12H   docusate sodium 100 mg Oral BID   gabapentin 100 mg Oral TID   glipiZIDE 2.5 mg Oral QAM AC   insulin lispro 0-9 Units Subcutaneous 4x Daily With Meals & Nightly   ketorolac 15 mg Intravenous Q8H  "  meloxicam 15 mg Oral Daily   metFORMIN 1,000 mg Oral BID With Meals   mupirocin  Each Nare BID   polyethylene glycol 17 g Oral BID     Allergies:   Codeine    Objective     Vital Signs   Temp:  [96.8 °F (36 °C)-98.6 °F (37 °C)] 97.1 °F (36.2 °C)  Heart Rate:  [43-88] 56  Resp:  [12-20] 16  BP: ()/(53-95) 91/53    Intake/Output Summary (Last 24 hours) at 5/7/2019 0925  Last data filed at 5/7/2019 0900  Gross per 24 hour   Intake 2755 ml   Output 1600 ml   Net 1155 ml     Flowsheet Rows      First Filed Value   Admission Height  160 cm (63\") Documented at 05/06/2019 1309   Admission Weight  63.5 kg (139 lb 14.4 oz) Documented at 05/06/2019 1309          Physical Exam:     General Appearance:    Alert, cooperative, in no acute distress   Head:    Normocephalic, without obvious abnormality, atraumatic   Eyes:            Lids and lashes normal, conjunctivae and sclerae normal, no   icterus, no pallor, corneas clear, PERRLA   Ears:    Ears appear intact with no abnormalities noted   Throat:   No oral lesions, no thrush, oral mucosa moist   Neck:   No adenopathy, supple, trachea midline, no thyromegaly, no   carotid bruit, no JVD   Back:     No kyphosis present, no scoliosis present, no skin lesions,      erythema or scars, no tenderness to percussion or                   palpation,   range of motion normal   Lungs:     Clear to auscultation,respirations regular, even and                  unlabored    Heart:    Regular rhythm and normal rate, normal S1 and S2, no            murmur, no gallop, no rub, no click   Chest Wall:    No abnormalities observed   Abdomen:     Normal bowel sounds, no masses, no organomegaly, soft        non-tender, non-distended, no guarding, no rebound                tenderness   Rectal:     Deferred   Extremities:   Moves all extremities well, no edema, no cyanosis, no             redness dressing clean dry intact   Pulses:   Pulses palpable and equal bilaterally   Skin:   No bleeding, " bruising or rash   Lymph nodes:   No palpable adenopathy   Neurologic:   Cranial nerves 2 - 12 grossly intact, sensation intact, DTR       present and equal bilaterally       Results Review:   I reviewed the patient's new clinical results.  I reviewed the patient's new imaging results and agree with the interpretation.  I reviewed the patient's other test results and agree with the interpretation    Results from last 7 days   Lab Units 05/07/19  0343   HEMOGLOBIN g/dL 12.9   HEMATOCRIT % 38.7               Invalid input(s): PROT, LABALBU              Assessment/Plan       Primary osteoarthritis of right hip    Hip pain, right    1.  Hyperglycemia with type 2 DM uncontrolled:  -A1c 7.6, likely needs tighter control  -currently on metformin 1000mg BID and Amaryl 1mg daily  -plane to increase Amaryl to 2mg daily (scripts sent to the pharmacy)  - No medical contraindication to DC from medical perspective, sugars high reactive from surgery and will comnedown over the next few days  -FU with Renu Alexis MD in 6 weeks      I discussed the patients findings and my recommendations with patient, family and nursing staff    Thank you very much for allowing us to participate in your patient's care.    Paramjit Ramon MD  05/07/19  9:25 AM    Time: 60 mins

## 2019-05-07 NOTE — ANESTHESIA POSTPROCEDURE EVALUATION
"Patient: Maureen Hernandez    Procedure Summary     Date:  05/06/19 Room / Location:  [facility] OR [address] [place] [address] MAIN OR    Anesthesia Start:  1429 Anesthesia Stop:  1645    Procedure:  RIGHT TOTAL HIP ARTHROPLASTY ANTERIOR WITH HANA TABLE (Right Hip) Diagnosis:       Primary osteoarthritis of right hip      (Primary osteoarthritis of right hip [M16.11])    Surgeon:  Estevan Sharma MD Provider:  London Morris MD    Anesthesia Type:  general ASA Status:  3          Anesthesia Type: general  Last vitals  BP   132/57 (05/06/19 1945)   Temp   36.4 °C (97.5 °F) (05/06/19 1830)   Pulse   54 (05/06/19 1945)   Resp   16 (05/06/19 1945)     SpO2   96 % (05/06/19 1945)     Post Anesthesia Care and Evaluation    Patient location during evaluation: bedside  Patient participation: complete - patient participated  Level of consciousness: awake and alert  Pain management: adequate  Airway patency: patent  Anesthetic complications: No anesthetic complications    Cardiovascular status: acceptable  Respiratory status: acceptable  Hydration status: acceptable    Comments: /57   Pulse 54   Temp 36.4 °C (97.5 °F) (Oral)   Resp 16   Ht 160 cm (63\")   Wt 63.5 kg (139 lb 14.4 oz)   SpO2 96%   BMI 24.78 kg/m²       "

## 2019-05-07 NOTE — NURSING NOTE
"DR SHEIKH, notified of pts BG last night and this am and that pt states she thinks she drank \"real gingerale\", orders received  "

## 2019-05-07 NOTE — DISCHARGE SUMMARY
Patient Name: Maureen Hernandez  Patient YOB: 1945    Date of Admission:  5/6/2019  Date of Discharge:  5/7/2019  Discharge Diagnosis: RIGHT TOTAL HIP ARTHROPLASTY ANTERIOR WITH HANA TABLE  Presenting Problem/History of Present Illness: Primary osteoarthritis of right hip [M16.11]  Hip pain, right [M25.551]  Admitting Physician: Dr Estevan Sharma  Consults:   Consults     Date and Time Order Name Status Description    5/7/2019 0705 Inpatient Internal Medicine Consult Completed           DETAILS OF HOSPITAL STAY:  Patient is a 74 y.o. female was admitted to the floor following the above procedure and underwent an uncomplicated hospital stay.  Patient did well with physical therapy and was ambulating well without problems.  On the day of discharge the wound was clean, dry and intact and calf was soft and non tender and Homans sign was negative.  Patient was tolerating  without problems.  Patient will be discharged home.  She did have an elevated fingerstick blood sugar to 290 the morning of postop day 1.  I obtained a hospitalist consult.  They adjusted her oral medication and felt that she was safe to go home with continued monitoring at home with adjustments of her diet and medication.    Condition on Discharge:  Stable    Vital Signs  Temp:  [96.8 °F (36 °C)-98.6 °F (37 °C)] 97.3 °F (36.3 °C)  Heart Rate:  [43-88] 64  Resp:  [12-20] 16  BP: ()/(50-95) 103/50    LABS:   Admission on 05/06/2019   Component Date Value Ref Range Status   • ABO Type 05/06/2019 O   Final   • RH type 05/06/2019 Positive   Final   • Antibody Screen 05/06/2019 Negative   Final   • T&S Expiration Date 05/06/2019 5/9/2019 11:59:59 PM   Final   • Glucose 05/06/2019 124  70 - 130 mg/dL Final   • Glucose 05/06/2019 170* 70 - 130 mg/dL Final   • Glucose 05/06/2019 218* 70 - 130 mg/dL Final   • Hemoglobin 05/07/2019 12.9  12.0 - 15.9 g/dL Final   • Hematocrit 05/07/2019 38.7  34.0 - 46.6 % Final   • Glucose 05/07/2019 290* 70 - 130  mg/dL Final   • Hemoglobin A1C 05/07/2019 7.60* 4.80 - 5.60 % Final       No results found.        Discharge Medications     Discharge Medications      New Medications      Instructions Start Date   aspirin  MG tablet   325 mg, Oral, 2 Times Daily      CLEARLAX powder  Generic drug:  polyethylene glycol   17 g, Oral, 2 Times Daily      meloxicam 15 MG tablet  Commonly known as:  MOBIC   15 mg, Oral, Daily      oxyCODONE-acetaminophen 7.5-325 MG per tablet  Commonly known as:  PERCOCET   1 tablet, Oral, Every 4 Hours PRN      pantoprazole 40 MG EC tablet  Commonly known as:  PROTONIX   40 mg, Oral, Daily         Changes to Medications      Instructions Start Date   glimepiride 1 MG tablet  Commonly known as:  AMARYL  What changed:  how much to take   2 mg, Oral, Nightly      traMADol 50 MG tablet  Commonly known as:  ULTRAM  What changed:  reasons to take this   50 mg, Oral, Every 6 Hours PRN         Continue These Medications      Instructions Start Date   CBD oral oil  Commonly known as:  cannabidiol   Oral, Daily, PT TO STOP PER MD INSTRUCTION      gabapentin 100 MG capsule  Commonly known as:  NEURONTIN   100 mg, Oral, 3 Times Daily      metFORMIN 1000 MG tablet  Commonly known as:  GLUCOPHAGE   1,000 mg, Oral, 2 Times Daily With Meals, TAKES 2 TABS TWICE PER DAY      simvastatin 20 MG tablet  Commonly known as:  ZOCOR   20 mg, Oral, Nightly      zolpidem 10 MG tablet  Commonly known as:  AMBIEN   10 mg, Oral, Nightly         Stop These Medications    acetaminophen 500 MG tablet  Commonly known as:  TYLENOL     HIBICLENS EX     multivitamin with minerals tablet tablet     mupirocin 2 % ointment  Commonly known as:  BACTROBAN            Activity at Discharge:     Discharge Instructions:   1)  Patient is to continue with physical therapy exercises daily and continue working with the physical therapist as ordered.  2)  Follow NO hip precautions.   3)  Patient may weight bear as tolerated.   4)  Apply ice  regularly. You may ice for long periods of time as long as ice is not directly on the skin. Patient instructed on frequent calf pumping exercises.  Patient also instructed on incentive spirometer during hospitalization and encouraged to continue to use at home regularly.   5)  The dressing should be left in place. If waterproof dressing is intact the patient may shower immediately following discharge. If the dressing becomes disloged or saturated it should be changed. Please refer to the I[facility] information sheet if you have any questions about the dressing.  If you have a home health nurse or therapist they can be contacted to assist with dressing change or repair. You may also call the [facility] dressing hotline for questions related to the dressing ([phone]). If there still other problems or questions related to the dressing despite these measures then you can contact Shiela at our office [phone].   6)  Follow up appointment in 2 weeks - patient to call the office at [phone] to schedule. 7)  Patient will be discharged on aspirin 325mg BID x 2weeks, then daily x 4weeks    Complete Discharge Diagnosis:    Patient Active Problem List   Diagnosis   • Pain of right sacroiliac joint   • Degenerative disc disease, lumbar   • Primary osteoarthritis of right hip   • Hip pain, right           Follow-up Appointments  Future Appointments   Date Time Provider Department Center   5/21/2019  2:00 PM Cece Gomez APRN [facility]              Estevan Sharma MD  05/07/19  1:02 PM

## 2019-05-07 NOTE — THERAPY DISCHARGE NOTE
Acute Care - Physical Therapy Initial Eval/Discharge  [facility]     Patient Name: Maureen Hernandez  : 1945  MRN: [id number]  Today's Date: 2019                Admit Date: 2019    Visit Dx:    ICD-10-CM ICD-9-CM   1. Primary osteoarthritis of right hip M16.11 715.15     Patient Active Problem List   Diagnosis   • Pain of right sacroiliac joint   • Degenerative disc disease, lumbar   • Primary osteoarthritis of right hip   • Hip pain, right     Past Medical History:   Diagnosis Date   • Arthritis    • DDD (degenerative disc disease), lumbar    • Diabetes (CMS/HCC)    • GERD (gastroesophageal reflux disease)    • Hyperlipidemia    • Palsy (CMS/[facility])    • Poor circulation    • Sleep apnea     NO CPAP USED   • Stress incontinence      Past Surgical History:   Procedure Laterality Date   • CHOLECYSTECTOMY     • HYSTERECTOMY     • KNEE SURGERY      meniscus   • KNEE SURGERY      meniscus   • LUMBAR FUSION      Dr. Ibrahim          PT ASSESSMENT (last 12 hours)      Physical Therapy Evaluation     Row Name 19 1013          PT Evaluation Time/Intention    Subjective Information  no complaints  -     Document Type  evaluation  -     Mode of Treatment  physical therapy  -     Patient Effort  good  -     Row Name 19 1013          General Information    Patient Observations  alert;cooperative;agree to therapy  -     General Observations of Patient  in bed  -     Prior Level of Function  independent:  -     Equipment Currently Used at Home  none  -     Pertinent History of Current Functional Problem  s/p R ant total hip  -     Existing Precautions/Restrictions  fall;hip, anterior  -     Row Name 19 1013          Relationship/Environment    Lives With  alone  -     Row Name 19 1013          Resource/Environmental Concerns    Current Living Arrangements  home/apartment/condo  -     Row Name 19 1013          Home Main Entrance    Number of Stairs, Main  Entrance  four  -[facility] Name 05/07/19 1013          Cognitive Assessment/Interventions    Additional Documentation  Cognitive Assessment/Intervention (Group)  -[facility] Name 05/07/19 1013          Cognitive Assessment/Intervention- PT/OT    Orientation Status (Cognition)  oriented x 4  -KH     Follows Commands (Cognition)  WNL  -KH     Personal Safety Interventions  fall prevention program maintained;gait belt;nonskid shoes/slippers when out of bed  -[facility] Name 05/07/19 1013          Mobility Assessment/Treatment    Extremity Weight-bearing Status  right lower extremity  -     Right Lower Extremity (Weight-bearing Status)  weight-bearing as tolerated (WBAT)  -[facility] Name 05/07/19 1013          Bed Mobility Assessment/Treatment    Bed Mobility Assessment/Treatment  bed mobility (all) activities  -     Temple Level (Bed Mobility)  supervision  -[facility] Name 05/07/19 1013          Transfer Assessment/Treatment    Transfer Assessment/Treatment  sit-stand transfer;stand-sit transfer  -     Sit-Stand Temple (Transfers)  contact guard  -     Stand-Sit Temple (Transfers)  supervision  -[facility] Name 05/07/19 1013          Sit-Stand Transfer    Assistive Device (Sit-Stand Transfers)  walker, front-wheeled  -[facility] Name 05/07/19 1013          Stand-Sit Transfer    Assistive Device (Stand-Sit Transfers)  walker, front-wheeled  -[facility] Name 05/07/19 1013          Gait/Stairs Assessment/Training    Temple Level (Gait)  supervision  -     Assistive Device (Gait)  walker, front-wheeled  -     Distance in Feet (Gait)  110  -KH     Pattern (Gait)  step-through  -KH     Negotiation (Stairs)  stairs independence  -     Temple Level (Stairs)  contact guard  -     Handrail Location (Stairs)  right side (ascending)  -     Number of Steps (Stairs)  4  -KH     Ascending Technique (Stairs)  step-to-step  -KH     Descending Technique (Stairs)  step-to-step  -KH      Row Name 05/07/19 1013          General ROM    GENERAL ROM COMMENTS  WFL except R hip  -     Row Name 05/07/19 1013          MMT (Manual Muscle Testing)    General MMT Comments  WFL  -     Row Name 05/07/19 1013          Motor Assessment/Intervention    Additional Documentation  Therapeutic Exercise Interventions (Group)  -     Row Name 05/07/19 1013          Therapeutic Exercise    Comment (Therapeutic Exercise)  R BARBARA protocol x 10 reps  -     Row Name 05/07/19 1013          Pain Assessment    Additional Documentation  Pain Scale: Numbers Pre/Post-Treatment (Group)  -     Row Name 05/07/19 1013          Pain Scale: Numbers Pre/Post-Treatment    Pain Scale: Numbers, Pretreatment  2/10  -KH     Pain Scale: Numbers, Post-Treatment  3/10  -KH     Pain Location - Side  Right  -KH     Pain Location  hip  -KH     Pain Intervention(s)  Cold applied;Repositioned;Ambulation/increased activity  -     Row Name             Wound 05/06/19 1610 Right hip incision    Wound - Properties Group Date first assessed: 05/06/19  -JT Time first assessed: 1610  -JT Side: Right  -JT Location: hip  -JT Type: incision  -JT    Row Name 05/07/19 1013          Plan of Care Review    Plan of Care Reviewed With  patient  -[facility] Name 05/07/19 1013          Physical Therapy Clinical Impression    Patient/Family Goals Statement (PT Clinical Impression)  return home to [facility]     Criteria for Skilled Interventions Met (PT Clinical Impression)  treatment indicated  -     Impairments Found (describe specific impairments)  gait, locomotion, and balance;ROM  -KH     Rehab Potential (PT Clinical Summary)  good, to achieve stated therapy goals  -     Row Name 05/07/19 1013          Positioning and Restraints    Pre-Treatment Position  in bed  -KH     Post Treatment Position  chair  -KH     In Chair  reclined;call light within reach;encouraged to call for assist;with family/caregiver;notified nsg  -     Row Name 05/07/19 1013           Living Environment    Home Accessibility  stairs to enter home  -       User Key  (r) = Recorded By, (t) = Taken By, (c) = Cosigned By    Initials Name Provider Type    Isamar Covington, NICKIE Physical Therapist    London Gimenez RN Registered Nurse              PT Recommendation and Plan  Anticipated Discharge Disposition (PT): home with home health  Therapy Frequency (PT Clinical Impression): evaluation only  Outcome Summary/Treatment Plan (PT)  Anticipated Discharge Disposition (PT): home with home health  Plan of Care Reviewed With: patient  Outcome Summary: Pt is s/p R BARBARA and presents with antaligic gait. She is ambulating well with Rwx, navigated stairs safely and is safe to d/c home when medically stable. PT will sign off.    Outcome Measures     Row Name 05/07/19 1000             How much help from another person do you currently need...    Turning from your back to your side while in flat bed without using bedrails?  4  -KH      Moving from lying on back to sitting on the side of a flat bed without bedrails?  4  -KH      Moving to and from a bed to a chair (including a wheelchair)?  3  -KH      Standing up from a chair using your arms (e.g., wheelchair, bedside chair)?  3  -KH      Climbing 3-5 steps with a railing?  3  -KH      To walk in hospital room?  3  -KH      AM-PAC 6 Clicks Score  20  -KH         Functional Assessment    Outcome Measure Options  AM-PAC 6 Clicks Basic Mobility (PT)  -        User Key  (r) = Recorded By, (t) = Taken By, (c) = Cosigned By    Initials Name Provider Type    Isamar Covington PT Physical Therapist           Time Calculation:   PT Charges     Row Name 05/07/19 1005             Time Calculation    Start Time  0948  -      Stop Time  1005  -      Time Calculation (min)  17 min  -FIDELIA      PT Received On  05/07/19  -         Time Calculation- PT    Total Timed Code Minutes- PT  8 minute(s)  -FIDELIA        User Key  (r) = Recorded By, (t) = Taken By,  (c) = Cosigned By    Initials Name Provider Type    Isamar Covington, PT Physical Therapist        Therapy Charges for Today     Code Description Service Date Service Provider Modifiers Qty    89059888591 HC PT EVAL MOD COMPLEXITY 2 5/7/2019 Isamar Jordan, PT GP 1    27986486474 HC PT THER PROC EA 15 MIN 5/7/2019 Isamar Jordan, PT GP 1          PT G-Codes  Outcome Measure Options: AM-PAC 6 Clicks Basic Mobility (PT)  AM-PAC 6 Clicks Score: 20    PT Discharge Summary  Anticipated Discharge Disposition (PT): home with home health    Isamar Jordan, PT  5/7/2019

## 2019-05-07 NOTE — PERIOPERATIVE NURSING NOTE
SPOKE BRIEFLY WITH DR SANCHEZ BY PHONE, GAVE HIM BRIEF Hx, EPISODES OF SB IN PACU WITH HR TO 40 AND ASYMPTOMATIC WITH SAME. HE WILL BE HERE SHORTLY TO EVALUATE

## 2019-05-07 NOTE — PROGRESS NOTES
Continued Stay Note  Psychiatric     Patient Name: Maureen Hernandez  MRN: [id number]  Today's Date: 5/7/2019    Admit Date: 5/6/2019    Discharge Plan     Row Name 05/07/19 1429       Plan    Plan  Home w/ [facility]     Final Discharge Disposition Code  06 - home with home health care    Final Note  S/w pt's son prior to d/c, pt is agreeable w/ [facility] ([facility] out of area/[place]). Rose notified and accepted.         Discharge Codes    No documentation.       Expected Discharge Date and Time     Expected Discharge Date Expected Discharge Time    May 7, 2019             Georgette Deleon RN

## 2019-05-07 NOTE — PLAN OF CARE
Problem: Patient Care Overview  Goal: Plan of Care Review   05/07/19 1021   OTHER   Outcome Summary Pt is s/p R BARBARA and presents with antaligic gait. She is ambulating well with Rwx, navigated stairs safely and is safe to d/c home when medically stable. PT will sign off.

## 2019-05-08 ENCOUNTER — TELEPHONE (OUTPATIENT)
Dept: ORTHOPEDIC SURGERY | Facility: CLINIC | Age: 74
End: 2019-05-08

## 2019-05-17 ENCOUNTER — TELEPHONE (OUTPATIENT)
Dept: ORTHOPEDIC SURGERY | Facility: CLINIC | Age: 74
End: 2019-05-17

## 2019-05-20 ENCOUNTER — TELEPHONE (OUTPATIENT)
Dept: ORTHOPEDIC SURGERY | Facility: CLINIC | Age: 74
End: 2019-05-20

## 2019-05-20 NOTE — TELEPHONE ENCOUNTER
Call returned to the home physical therapist.  Patient is doing some pain in her right SI joint.  She is still taking the meloxicam however is not taking narcotics since they cause GI upset.  For now would recommend that she does take some Tylenol for the pain and patient is scheduled to see MLL tomorrow for follow-up.  Will address her pain at that time.  Patient is okay to discharge from home health and will begin outpatient PT

## 2019-05-21 ENCOUNTER — OFFICE VISIT (OUTPATIENT)
Dept: ORTHOPEDIC SURGERY | Facility: CLINIC | Age: 74
End: 2019-05-21

## 2019-05-21 VITALS — TEMPERATURE: 98.9 F | BODY MASS INDEX: 23.92 KG/M2 | HEIGHT: 63 IN | WEIGHT: 135 LBS

## 2019-05-21 DIAGNOSIS — Z96.641 HISTORY OF TOTAL RIGHT HIP REPLACEMENT: Primary | ICD-10-CM

## 2019-05-21 PROCEDURE — 99024 POSTOP FOLLOW-UP VISIT: CPT | Performed by: NURSE PRACTITIONER

## 2019-05-21 PROCEDURE — 73502 X-RAY EXAM HIP UNI 2-3 VIEWS: CPT | Performed by: NURSE PRACTITIONER

## 2019-05-21 NOTE — PROGRESS NOTES
Maureen Hernandez : 1945 MRN: [id number] DATE: 2019    DIAGNOSIS: 2 week follow up right total hip     SUBJECTIVE:Patient returns today for 2 week follow up of right total hip replacement. Patient reports doing well with no unusual complaints. Appears to be progressing appropriately.    OBJECTIVE:   Exam:. The incision is healing appropriately. No sign of infection. Range of motion is progressing as expected. The calf is soft and nontender with a negative Homans sign.    DIAGNOSTIC STUDIES  Xrays: 2 views of the right hip (AP pelvis and lateral right hip) were ordered and reviewed for evaluation of recent hip replacement. They demonstrate a well positioned, well aligned hip replacement without complicating factors noted. In comparison with previous films there has been interval implant placement.    ASSESSMENT: 2 week status post right hip replacement.    PLAN: 1) Staples removed and steri strips applied   2) PT exercises   3) Discontinue ANNELIESE hose   4) Continue ice PRN   5) WBAT   6) aspirin 325 mg orally every day for 1 month   7) Follow up in 6 weeks with repeat Xrays of right hip (2views)    Cece Gomez, APRN  2019

## 2019-06-18 ENCOUNTER — OFFICE VISIT (OUTPATIENT)
Dept: ORTHOPEDIC SURGERY | Facility: CLINIC | Age: 74
End: 2019-06-18

## 2019-06-18 VITALS — TEMPERATURE: 98 F | WEIGHT: 136 LBS | HEIGHT: 63 IN | BODY MASS INDEX: 24.1 KG/M2

## 2019-06-18 DIAGNOSIS — Z96.641 HISTORY OF TOTAL RIGHT HIP REPLACEMENT: Primary | ICD-10-CM

## 2019-06-18 PROCEDURE — 99024 POSTOP FOLLOW-UP VISIT: CPT | Performed by: ORTHOPAEDIC SURGERY

## 2019-06-18 PROCEDURE — 73502 X-RAY EXAM HIP UNI 2-3 VIEWS: CPT | Performed by: ORTHOPAEDIC SURGERY

## 2019-06-18 NOTE — PROGRESS NOTES
Maureen Hernandez : 1945 MRN: [id number] DATE: 2019    DIAGNOSIS: 5 week follow up right total hip anterior    SUBJECTIVE:Patient returns today for 8 week follow up of right total hip replacement. Patient reports doing well with no unusual complaints. Appears to be progressing appropriately and is off a cane    OBJECTIVE:   Exam:. The incision is healed. No sign of infection. Range of motion is progressing as expected. The calf is soft and nontender with a negative Homans sign. Strength progressing    DIAGNOSTIC STUDIES  Xrays: 2 views of the right hip (AP pelvis and lateral right hip) were ordered and reviewed for evaluation of recent hip replacement. They demonstrate a well positioned, well aligned hip replacement without complicating factors noted. In comparison with previous films there has been interval implant placement.    ASSESSMENT: 5week status post right hip replacement.    PLAN: 1) Activity as tolerated   2) Continue hip strengthening exercises    3) Follow up 1 year post-op with repeat Xrays of right hip (2views AP Pelvis      and lateral left hip)    Estevan Sharma MD  2019

## 2019-08-14 ENCOUNTER — HOSPITAL ENCOUNTER (OUTPATIENT)
Dept: FAMILY MEDICINE CLINIC | Facility: CLINIC | Age: 74
Discharge: HOME | End: 2019-08-14
Attending: FAMILY MEDICINE

## 2019-08-14 ENCOUNTER — OFFICE VISIT CONVERTED (OUTPATIENT)
Dept: FAMILY MEDICINE CLINIC | Facility: CLINIC | Age: 74
End: 2019-08-14
Attending: NURSE PRACTITIONER

## 2019-08-14 LAB
ALBUMIN SERPL-MCNC: 4.1 G/DL (ref 3.5–5)
ALBUMIN/GLOB SERPL: 1.5 {RATIO} (ref 1.4–2.6)
ALP SERPL-CCNC: 68 U/L (ref 43–160)
ALT SERPL-CCNC: 21 U/L (ref 10–40)
ANION GAP SERPL CALC-SCNC: 20 MMOL/L (ref 8–19)
AST SERPL-CCNC: 17 U/L (ref 15–50)
BILIRUB SERPL-MCNC: 0.17 MG/DL (ref 0.2–1.3)
BUN SERPL-MCNC: 13 MG/DL (ref 5–25)
BUN/CREAT SERPL: 18 {RATIO} (ref 6–20)
CALCIUM SERPL-MCNC: 10.1 MG/DL (ref 8.7–10.4)
CHLORIDE SERPL-SCNC: 95 MMOL/L (ref 99–111)
CHOLEST SERPL-MCNC: 184 MG/DL (ref 107–200)
CHOLEST/HDLC SERPL: 4.2 {RATIO} (ref 3–6)
CONV CO2: 23 MMOL/L (ref 22–32)
CONV CREATININE URINE, RANDOM: 26.3 MG/DL (ref 10–300)
CONV MICROALBUM.,U,RANDOM: <12 MG/L (ref 0–20)
CONV TOTAL PROTEIN: 6.8 G/DL (ref 6.3–8.2)
CREAT UR-MCNC: 0.71 MG/DL (ref 0.5–0.9)
EST. AVERAGE GLUCOSE BLD GHB EST-MCNC: 180 MG/DL
GFR SERPLBLD BASED ON 1.73 SQ M-ARVRAT: >60 ML/MIN/{1.73_M2}
GLOBULIN UR ELPH-MCNC: 2.7 G/DL (ref 2–3.5)
GLUCOSE SERPL-MCNC: 200 MG/DL (ref 65–99)
HBA1C MFR BLD: 7.9 % (ref 3.5–5.7)
HDLC SERPL-MCNC: 44 MG/DL (ref 40–60)
LDLC SERPL CALC-MCNC: 86 MG/DL (ref 70–100)
MICROALBUMIN/CREAT UR: 45.6 MG/G{CRE} (ref 0–35)
OSMOLALITY SERPL CALC.SUM OF ELEC: 284 MOSM/KG (ref 273–304)
POTASSIUM SERPL-SCNC: 4.3 MMOL/L (ref 3.5–5.3)
SODIUM SERPL-SCNC: 134 MMOL/L (ref 135–147)
T4 FREE SERPL-MCNC: 1.2 NG/DL (ref 0.9–1.8)
TRIGL SERPL-MCNC: 269 MG/DL (ref 40–150)
TSH SERPL-ACNC: 1.47 M[IU]/L (ref 0.27–4.2)
VLDLC SERPL-MCNC: 54 MG/DL (ref 5–37)

## 2019-10-07 ENCOUNTER — HOSPITAL ENCOUNTER (OUTPATIENT)
Dept: NUCLEAR MEDICINE | Facility: HOSPITAL | Age: 74
Discharge: HOME | End: 2019-10-07
Attending: INTERNAL MEDICINE

## 2020-03-06 ENCOUNTER — TELEPHONE (OUTPATIENT)
Dept: ORTHOPEDIC SURGERY | Facility: CLINIC | Age: 75
End: 2020-03-06

## 2020-03-06 RX ORDER — CEPHALEXIN 500 MG/1
CAPSULE ORAL
Qty: 4 CAPSULE | Refills: 2 | Status: SHIPPED | OUTPATIENT
Start: 2020-03-06 | End: 2020-05-07

## 2020-03-06 NOTE — TELEPHONE ENCOUNTER
Have sent in a new prescription to [facility] pharmacy at [phone] for Keflex 500 mg, #4, directions are to take all 4 capsules 1 hour prior to her procedure refill x2 per RBB

## 2020-05-07 ENCOUNTER — OFFICE VISIT (OUTPATIENT)
Dept: ORTHOPEDIC SURGERY | Facility: CLINIC | Age: 75
End: 2020-05-07

## 2020-05-07 VITALS — HEIGHT: 63 IN | TEMPERATURE: 96.4 F | BODY MASS INDEX: 26.33 KG/M2 | WEIGHT: 148.6 LBS

## 2020-05-07 DIAGNOSIS — M25.551 HIP PAIN, RIGHT: Primary | ICD-10-CM

## 2020-05-07 PROCEDURE — 99212 OFFICE O/P EST SF 10 MIN: CPT | Performed by: ORTHOPAEDIC SURGERY

## 2020-05-07 PROCEDURE — 73502 X-RAY EXAM HIP UNI 2-3 VIEWS: CPT | Performed by: ORTHOPAEDIC SURGERY

## 2020-05-07 RX ORDER — INSULIN GLARGINE 100 [IU]/ML
INJECTION, SOLUTION SUBCUTANEOUS
COMMUNITY
Start: 2020-02-13

## 2020-05-07 NOTE — PROGRESS NOTES
"Maureen Hernandez : 1945 MRN: [id number] DATE: 2020    Chief Complaint:  Follow up right total hip anterior    SUBJECTIVE:Patient returns today for  1 year  follow up of right total hip replacement. Patient reports doing well with no unusual complaints. Denies any limitations due to the hip.    OBJECTIVE:    Temp 96.4 °F (35.8 °C) (Temporal)   Ht 160 cm (63\")   Wt 67.4 kg (148 lb 9.6 oz)   BMI 26.32 kg/m²   Family History   Problem Relation Age of Onset   • Heart disease Mother    • Diabetes Father    • Heart disease Father    • Diabetes Sister    • Cancer Brother    • Malig Hyperthermia Neg Hx      Past Medical History:   Diagnosis Date   • Arthritis    • DDD (degenerative disc disease), lumbar    • Diabetes (CMS/HCC)    • GERD (gastroesophageal reflux disease)    • Hyperlipidemia    • Palsy (CMS/HCC)    • Poor circulation    • Sleep apnea     NO CPAP USED   • Stress incontinence      Past Surgical History:   Procedure Laterality Date   • CHOLECYSTECTOMY     • HYSTERECTOMY     • KNEE SURGERY      meniscus   • KNEE SURGERY      meniscus   • LUMBAR FUSION      Dr. Ibrahim   • TOTAL HIP ARTHROPLASTY Right 2019    Procedure: RIGHT TOTAL HIP ARTHROPLASTY ANTERIOR WITH HANA TABLE;  Surgeon: Estevan Sharma MD;  Location: [facility];  Service: Orthopedics     Social History     Socioeconomic History   • Marital status:      Spouse name: Not on file   • Number of children: Not on file   • Years of education: Not on file   • Highest education level: Not on file   Occupational History   • Occupation: retired teacher   Tobacco Use   • Smoking status: Never Smoker   • Smokeless tobacco: Never Used   Substance and Sexual Activity   • Alcohol use: No   • Drug use: No   • Sexual activity: Defer       Review of Systems: 14 point review of systems performed pertinent positives and negatives discussed above, all other systems are negative    Exam:. The incision is well healed. Range of motion is good " without irritability. The calf is soft and nontender with a negative Homans sign. Alignment is neutral. Leg lengths are equal. Good hip flexion and abduction strength.Walks with nonantalgic gait. Intact to light touch with palpable distal pulses.     DIAGNOSTIC STUDIES  Xrays:Xrays 2 view right hip AP and lateral ordered: ordered and reviewed demonstrate a well positioned BARBARA without complicating factors. In comparison to previous films there are no changes    ASSESSMENT:    Follow up right hip replacement. doing well       PLAN:   Continue activities as tolerated  Follow up PRRACHEL Sharma MD  5/7/2020

## 2021-05-15 VITALS
HEIGHT: 63 IN | SYSTOLIC BLOOD PRESSURE: 122 MMHG | TEMPERATURE: 97 F | OXYGEN SATURATION: 98 % | HEART RATE: 69 BPM | BODY MASS INDEX: 25.16 KG/M2 | RESPIRATION RATE: 14 BRPM | WEIGHT: 142 LBS | DIASTOLIC BLOOD PRESSURE: 70 MMHG

## 2021-05-15 VITALS
TEMPERATURE: 98.3 F | HEART RATE: 94 BPM | WEIGHT: 141.12 LBS | SYSTOLIC BLOOD PRESSURE: 108 MMHG | DIASTOLIC BLOOD PRESSURE: 60 MMHG | OXYGEN SATURATION: 96 % | BODY MASS INDEX: 25 KG/M2 | HEIGHT: 63 IN

## 2021-05-15 VITALS
HEIGHT: 63 IN | OXYGEN SATURATION: 96 % | BODY MASS INDEX: 25.52 KG/M2 | DIASTOLIC BLOOD PRESSURE: 74 MMHG | TEMPERATURE: 98.5 F | SYSTOLIC BLOOD PRESSURE: 132 MMHG | HEART RATE: 70 BPM | WEIGHT: 144 LBS

## 2021-05-16 VITALS
SYSTOLIC BLOOD PRESSURE: 124 MMHG | RESPIRATION RATE: 16 BRPM | WEIGHT: 139 LBS | HEART RATE: 63 BPM | DIASTOLIC BLOOD PRESSURE: 84 MMHG | OXYGEN SATURATION: 98 % | TEMPERATURE: 98.9 F | BODY MASS INDEX: 24.63 KG/M2 | HEIGHT: 63 IN

## 2021-05-16 VITALS — WEIGHT: 144 LBS | HEART RATE: 74 BPM | HEIGHT: 63 IN | BODY MASS INDEX: 25.52 KG/M2 | OXYGEN SATURATION: 96 %

## 2021-05-16 VITALS
HEART RATE: 75 BPM | BODY MASS INDEX: 25.16 KG/M2 | DIASTOLIC BLOOD PRESSURE: 47 MMHG | RESPIRATION RATE: 16 BRPM | TEMPERATURE: 97.2 F | HEIGHT: 63 IN | WEIGHT: 142 LBS | OXYGEN SATURATION: 97 % | SYSTOLIC BLOOD PRESSURE: 110 MMHG

## 2021-05-16 VITALS
HEIGHT: 63 IN | DIASTOLIC BLOOD PRESSURE: 60 MMHG | BODY MASS INDEX: 25.69 KG/M2 | HEART RATE: 81 BPM | TEMPERATURE: 97.9 F | SYSTOLIC BLOOD PRESSURE: 108 MMHG | OXYGEN SATURATION: 97 % | WEIGHT: 145 LBS

## 2021-05-16 VITALS
SYSTOLIC BLOOD PRESSURE: 122 MMHG | WEIGHT: 138.31 LBS | TEMPERATURE: 98.5 F | HEART RATE: 79 BPM | DIASTOLIC BLOOD PRESSURE: 72 MMHG | OXYGEN SATURATION: 96 % | HEIGHT: 63 IN | BODY MASS INDEX: 24.51 KG/M2

## 2021-05-16 VITALS
HEIGHT: 63 IN | HEART RATE: 78 BPM | DIASTOLIC BLOOD PRESSURE: 60 MMHG | BODY MASS INDEX: 24.8 KG/M2 | WEIGHT: 140 LBS | SYSTOLIC BLOOD PRESSURE: 114 MMHG

## 2021-05-16 VITALS
HEART RATE: 96 BPM | HEIGHT: 63 IN | DIASTOLIC BLOOD PRESSURE: 70 MMHG | OXYGEN SATURATION: 97 % | TEMPERATURE: 97.8 F | WEIGHT: 145.5 LBS | BODY MASS INDEX: 25.78 KG/M2 | RESPIRATION RATE: 16 BRPM | SYSTOLIC BLOOD PRESSURE: 124 MMHG

## 2021-05-16 VITALS
BODY MASS INDEX: 25.18 KG/M2 | HEART RATE: 79 BPM | DIASTOLIC BLOOD PRESSURE: 68 MMHG | RESPIRATION RATE: 22 BRPM | TEMPERATURE: 98.1 F | SYSTOLIC BLOOD PRESSURE: 112 MMHG | WEIGHT: 142.12 LBS | HEIGHT: 63 IN | OXYGEN SATURATION: 98 %

## 2021-05-16 VITALS — HEART RATE: 84 BPM | OXYGEN SATURATION: 98 % | HEIGHT: 63 IN | WEIGHT: 144 LBS | BODY MASS INDEX: 25.52 KG/M2

## 2021-07-14 ENCOUNTER — TRANSCRIBE ORDERS (OUTPATIENT)
Dept: ADMINISTRATIVE | Facility: HOSPITAL | Age: 76
End: 2021-07-14

## 2021-07-14 DIAGNOSIS — Z12.31 VISIT FOR SCREENING MAMMOGRAM: Primary | ICD-10-CM

## 2021-09-23 ENCOUNTER — APPOINTMENT (OUTPATIENT)
Dept: MAMMOGRAPHY | Facility: HOSPITAL | Age: 76
End: 2021-09-23

## 2021-11-03 ENCOUNTER — HOSPITAL ENCOUNTER (OUTPATIENT)
Dept: MAMMOGRAPHY | Facility: HOSPITAL | Age: 76
Discharge: HOME | End: 2021-11-03
Admitting: INTERNAL MEDICINE

## 2021-11-03 DIAGNOSIS — Z12.31 VISIT FOR SCREENING MAMMOGRAM: ICD-10-CM

## 2021-11-03 PROCEDURE — 77063 BREAST TOMOSYNTHESIS BI: CPT

## 2021-11-03 PROCEDURE — 77067 SCR MAMMO BI INCL CAD: CPT

## 2022-04-23 ENCOUNTER — HOSPITAL ENCOUNTER (EMERGENCY)
Facility: HOSPITAL | Age: 77
Discharge: HOME | End: 2022-04-23
Attending: EMERGENCY MEDICINE | Admitting: EMERGENCY MEDICINE

## 2022-04-23 ENCOUNTER — APPOINTMENT (OUTPATIENT)
Dept: CT IMAGING | Facility: HOSPITAL | Age: 77
End: 2022-04-23

## 2022-04-23 ENCOUNTER — APPOINTMENT (OUTPATIENT)
Dept: GENERAL RADIOLOGY | Facility: HOSPITAL | Age: 77
End: 2022-04-23

## 2022-04-23 VITALS
HEART RATE: 64 BPM | WEIGHT: 146.61 LBS | SYSTOLIC BLOOD PRESSURE: 109 MMHG | HEIGHT: 63 IN | RESPIRATION RATE: 17 BRPM | TEMPERATURE: 96.9 F | BODY MASS INDEX: 25.98 KG/M2 | OXYGEN SATURATION: 99 % | DIASTOLIC BLOOD PRESSURE: 56 MMHG

## 2022-04-23 DIAGNOSIS — G47.00 INSOMNIA, UNSPECIFIED TYPE: ICD-10-CM

## 2022-04-23 DIAGNOSIS — N39.0 UTI (URINARY TRACT INFECTION) WITH PYURIA: Primary | ICD-10-CM

## 2022-04-23 DIAGNOSIS — E87.1 HYPONATREMIA: ICD-10-CM

## 2022-04-23 LAB
ALBUMIN SERPL-MCNC: 4.3 G/DL (ref 3.5–5.2)
ALBUMIN/GLOB SERPL: 1.7 G/DL
ALP SERPL-CCNC: 63 U/L (ref 39–117)
ALT SERPL W P-5'-P-CCNC: 85 U/L (ref 1–33)
ANION GAP SERPL CALCULATED.3IONS-SCNC: 10.9 MMOL/L (ref 5–15)
AST SERPL-CCNC: 46 U/L (ref 1–32)
BACTERIA UR QL AUTO: ABNORMAL /HPF
BASOPHILS # BLD AUTO: 0.03 10*3/MM3 (ref 0–0.2)
BASOPHILS NFR BLD AUTO: 0.5 % (ref 0–1.5)
BILIRUB SERPL-MCNC: 0.3 MG/DL (ref 0–1.2)
BILIRUB UR QL STRIP: NEGATIVE
BUN SERPL-MCNC: 19 MG/DL (ref 8–23)
BUN/CREAT SERPL: 22.6 (ref 7–25)
CALCIUM SPEC-SCNC: 10.1 MG/DL (ref 8.6–10.5)
CHLORIDE SERPL-SCNC: 96 MMOL/L (ref 98–107)
CLARITY UR: CLEAR
CO2 SERPL-SCNC: 23.1 MMOL/L (ref 22–29)
COLOR UR: ABNORMAL
CREAT SERPL-MCNC: 0.84 MG/DL (ref 0.57–1)
DEPRECATED RDW RBC AUTO: 44.5 FL (ref 37–54)
EGFRCR SERPLBLD CKD-EPI 2021: 72.6 ML/MIN/1.73
EOSINOPHIL # BLD AUTO: 0.07 10*3/MM3 (ref 0–0.4)
EOSINOPHIL NFR BLD AUTO: 1.2 % (ref 0.3–6.2)
ERYTHROCYTE [DISTWIDTH] IN BLOOD BY AUTOMATED COUNT: 13.1 % (ref 12.3–15.4)
GLOBULIN UR ELPH-MCNC: 2.6 GM/DL
GLUCOSE BLDC GLUCOMTR-MCNC: 185 MG/DL (ref 70–99)
GLUCOSE SERPL-MCNC: 202 MG/DL (ref 65–99)
GLUCOSE UR STRIP-MCNC: NEGATIVE MG/DL
HCT VFR BLD AUTO: 39.7 % (ref 34–46.6)
HGB BLD-MCNC: 13.6 G/DL (ref 12–15.9)
HGB UR QL STRIP.AUTO: NEGATIVE
HOLD SPECIMEN: NORMAL
HOLD SPECIMEN: NORMAL
HYALINE CASTS UR QL AUTO: ABNORMAL /LPF
IMM GRANULOCYTES # BLD AUTO: 0.01 10*3/MM3 (ref 0–0.05)
IMM GRANULOCYTES NFR BLD AUTO: 0.2 % (ref 0–0.5)
KETONES UR QL STRIP: NEGATIVE
LEUKOCYTE ESTERASE UR QL STRIP.AUTO: ABNORMAL
LYMPHOCYTES # BLD AUTO: 1.41 10*3/MM3 (ref 0.7–3.1)
LYMPHOCYTES NFR BLD AUTO: 24.2 % (ref 19.6–45.3)
MAGNESIUM SERPL-MCNC: 2 MG/DL (ref 1.6–2.4)
MCH RBC QN AUTO: 31.8 PG (ref 26.6–33)
MCHC RBC AUTO-ENTMCNC: 34.3 G/DL (ref 31.5–35.7)
MCV RBC AUTO: 92.8 FL (ref 79–97)
MONOCYTES # BLD AUTO: 0.43 10*3/MM3 (ref 0.1–0.9)
MONOCYTES NFR BLD AUTO: 7.4 % (ref 5–12)
NEUTROPHILS NFR BLD AUTO: 3.88 10*3/MM3 (ref 1.7–7)
NEUTROPHILS NFR BLD AUTO: 66.5 % (ref 42.7–76)
NITRITE UR QL STRIP: NEGATIVE
NRBC BLD AUTO-RTO: 0 /100 WBC (ref 0–0.2)
PH UR STRIP.AUTO: 6.5 [PH] (ref 5–8)
PLATELET # BLD AUTO: 309 10*3/MM3 (ref 140–450)
PMV BLD AUTO: 9.1 FL (ref 6–12)
POTASSIUM SERPL-SCNC: 4.5 MMOL/L (ref 3.5–5.2)
PROT SERPL-MCNC: 6.9 G/DL (ref 6–8.5)
PROT UR QL STRIP: NEGATIVE
QT INTERVAL: 370 MS
RBC # BLD AUTO: 4.28 10*6/MM3 (ref 3.77–5.28)
RBC # UR STRIP: ABNORMAL /HPF
REF LAB TEST METHOD: ABNORMAL
SODIUM SERPL-SCNC: 130 MMOL/L (ref 136–145)
SP GR UR STRIP: 1.02 (ref 1–1.03)
SQUAMOUS #/AREA URNS HPF: ABNORMAL /HPF
TROPONIN T SERPL-MCNC: <0.01 NG/ML (ref 0–0.03)
UROBILINOGEN UR QL STRIP: ABNORMAL
WBC # UR STRIP: ABNORMAL /HPF
WBC NRBC COR # BLD: 5.83 10*3/MM3 (ref 3.4–10.8)
WHOLE BLOOD HOLD SPECIMEN: NORMAL
WHOLE BLOOD HOLD SPECIMEN: NORMAL

## 2022-04-23 PROCEDURE — 80053 COMPREHEN METABOLIC PANEL: CPT | Performed by: EMERGENCY MEDICINE

## 2022-04-23 PROCEDURE — 25010000002 CEFTRIAXONE PER 250 MG: Performed by: EMERGENCY MEDICINE

## 2022-04-23 PROCEDURE — 93010 ELECTROCARDIOGRAM REPORT: CPT | Performed by: INTERNAL MEDICINE

## 2022-04-23 PROCEDURE — 93005 ELECTROCARDIOGRAM TRACING: CPT | Performed by: EMERGENCY MEDICINE

## 2022-04-23 PROCEDURE — 81001 URINALYSIS AUTO W/SCOPE: CPT | Performed by: EMERGENCY MEDICINE

## 2022-04-23 PROCEDURE — 71045 X-RAY EXAM CHEST 1 VIEW: CPT

## 2022-04-23 PROCEDURE — 84484 ASSAY OF TROPONIN QUANT: CPT | Performed by: EMERGENCY MEDICINE

## 2022-04-23 PROCEDURE — 70450 CT HEAD/BRAIN W/O DYE: CPT

## 2022-04-23 PROCEDURE — 83735 ASSAY OF MAGNESIUM: CPT | Performed by: EMERGENCY MEDICINE

## 2022-04-23 PROCEDURE — 99283 EMERGENCY DEPT VISIT LOW MDM: CPT

## 2022-04-23 PROCEDURE — 85025 COMPLETE CBC W/AUTO DIFF WBC: CPT | Performed by: EMERGENCY MEDICINE

## 2022-04-23 PROCEDURE — 82962 GLUCOSE BLOOD TEST: CPT

## 2022-04-23 PROCEDURE — 96365 THER/PROPH/DIAG IV INF INIT: CPT

## 2022-04-23 PROCEDURE — 36415 COLL VENOUS BLD VENIPUNCTURE: CPT

## 2022-04-23 RX ORDER — CEPHALEXIN 500 MG/1
500 CAPSULE ORAL 4 TIMES DAILY
Qty: 28 CAPSULE | Refills: 0 | Status: SHIPPED | OUTPATIENT
Start: 2022-04-23 | End: 2022-04-23 | Stop reason: SDUPTHER

## 2022-04-23 RX ORDER — ESZOPICLONE 1 MG/1
1 TABLET, FILM COATED ORAL NIGHTLY
Qty: 10 TABLET | Refills: 0 | Status: SHIPPED | OUTPATIENT
Start: 2022-04-23 | End: 2022-04-23 | Stop reason: SDUPTHER

## 2022-04-23 RX ORDER — ESZOPICLONE 1 MG/1
1 TABLET, FILM COATED ORAL NIGHTLY
Qty: 10 TABLET | Refills: 0 | Status: SHIPPED | OUTPATIENT
Start: 2022-04-23

## 2022-04-23 RX ORDER — CEPHALEXIN 500 MG/1
500 CAPSULE ORAL 4 TIMES DAILY
Qty: 28 CAPSULE | Refills: 0 | Status: SHIPPED | OUTPATIENT
Start: 2022-04-23

## 2022-04-23 RX ORDER — SODIUM CHLORIDE 0.9 % (FLUSH) 0.9 %
10 SYRINGE (ML) INJECTION AS NEEDED
Status: DISCONTINUED | OUTPATIENT
Start: 2022-04-23 | End: 2022-04-23 | Stop reason: HOSPADM

## 2022-04-23 RX ORDER — CEFTRIAXONE SODIUM 1 G/50ML
1 INJECTION, SOLUTION INTRAVENOUS ONCE
Status: COMPLETED | OUTPATIENT
Start: 2022-04-23 | End: 2022-04-23

## 2022-04-23 RX ADMIN — CEFTRIAXONE SODIUM 1 G: 1 INJECTION, SOLUTION INTRAVENOUS at 15:28

## 2022-04-23 NOTE — ED PROVIDER NOTES
Time: 13:19 EDT  Arrived by: car  Chief Complaint: generalized weakness, dizziness, constipation  History provided by: patient  History is limited by: N/A     History of Present Illness:  Patient is a 77 y.o. year old female who presents to the emergency department with GENERALIZED WEAKNESS, DIZZINESS and CONSTIPATION which began about 4-5 days ago.     Pt reports she has not been able to sleep for the past 4-5 days. She denies any abdominal pain, vomiting, diarrhea or dysuria      She reports a history of diabetic gastroparesis.       Weakness - Generalized  Severity:  Moderate  Duration:  5 days  Context: decreased sleep    Associated symptoms: dizziness    Associated symptoms: no abdominal pain, no chest pain, no cough, no diarrhea, no dysuria, no fever, no headaches, no nausea, no shortness of breath and no vomiting            Patient Care Team  Primary Care Provider: Richelle Quinonez MD    Past Medical History:     Allergies   Allergen Reactions   • Oxycodone-Acetaminophen GI Intolerance     Severe nausea   • Codeine Itching, Hives and Rash     Other reaction(s): hives     Past Medical History:   Diagnosis Date   • Arthritis    • DDD (degenerative disc disease), lumbar    • Diabetes (HCC)    • GERD (gastroesophageal reflux disease)    • Hyperlipidemia    • Palsy (HCC)    • Poor circulation    • Sleep apnea     NO CPAP USED   • Stress incontinence      Past Surgical History:   Procedure Laterality Date   • CATARACT EXTRACTION Bilateral    • CHOLECYSTECTOMY  1998   • HYSTERECTOMY     • KNEE SURGERY      meniscus   • KNEE SURGERY      meniscus   • LUMBAR FUSION  2013    Dr. Ibrahim   • TOTAL HIP ARTHROPLASTY Right 05/06/2019    Procedure: RIGHT TOTAL HIP ARTHROPLASTY ANTERIOR WITH HANA TABLE;  Surgeon: Estevan Sharma MD;  Location: [facility];  Service: Orthopedics     Family History   Problem Relation Age of Onset   • Heart disease Mother    • Diabetes Father    • Heart disease Father    • Diabetes Sister    •  Cancer Brother    • Malig Hyperthermia Neg Hx        Home Medications:  Prior to Admission medications    Medication Sig Start Date End Date Taking? Authorizing Provider   aspirin 325 MG EC tablet Take 1 tablet by mouth 2 (Two) Times a Day.  Patient taking differently: Take 325 mg by mouth Daily. 5/6/19   Estevan Sharma MD   atorvastatin (LIPITOR) 40 MG tablet Take 40 mg by mouth Daily. 1/5/22   Emergency, Nurse Moore RN   gabapentin (NEURONTIN) 100 MG capsule Take 100 mg by mouth 3 (Three) Times a Day. 2/9/19   Carrie Oates MD   glimepiride (AMARYL) 4 MG tablet Take 4 mg by mouth Daily. 1/5/22   Emergency, Nurse Epic, RN   LANTUS SOLOSTAR 100 UNIT/ML injection pen INJECT 15 UNITS SUBCUTANEOUSLY EVERY DAY AT BEDTIME FOR 30 DAYS 2/13/20   Carrie Oates MD   magnesium oxide (MAG-OX) 400 MG tablet Take 1 tablet by mouth Every Night. 2/15/22   EmergencyNurse Moore RN   metFORMIN (GLUCOPHAGE) 1000 MG tablet Take 1,000 mg by mouth 2 (Two) Times a Day With Meals. TAKES 2 TABS TWICE PER DAY     Carrie Oates MD   metFORMIN (GLUCOPHAGE) 500 MG tablet metformin 500 mg tablet   TAKE 2 TABLETS BY MOUTH TWICE DAILY    Emergency, Nurse Moore RN   simvastatin (ZOCOR) 20 MG tablet Take 20 mg by mouth Every Night. 8/24/18   Carrie Oates MD   CBD (cannabidiol) oral oil Take  by mouth Daily. PT TO STOP PER MD INSTRUCTION  4/23/22  Carrie Oates MD   gabapentin (NEURONTIN) 300 MG capsule gabapentin 300 mg capsule   TAKE 1 CAPSULE BY MOUTH THREE TIMES DAILY  4/23/22  Emergency, Nurse Moore RN   glimepiride (AMARYL) 1 MG tablet Take 2 tablets by mouth Every Night. 5/7/19 4/23/22  Paramjit Ramon MD        Social History:   Social History     Tobacco Use   • Smoking status: Never Smoker   • Smokeless tobacco: Never Used   Vaping Use   • Vaping Use: Never used   Substance Use Topics   • Alcohol use: No   • Drug use: No        Review of Systems:  Review of Systems   Constitutional: Negative  "for chills and fever.   HENT: Negative for ear discharge.    Eyes: Negative for photophobia.   Respiratory: Negative for cough and shortness of breath.    Cardiovascular: Negative for chest pain.   Gastrointestinal: Positive for constipation. Negative for abdominal pain, diarrhea, nausea and vomiting.   Genitourinary: Negative for dysuria.   Musculoskeletal: Negative for back pain and neck pain.   Skin: Negative for rash.   Neurological: Positive for dizziness and weakness (generalized). Negative for headaches.   Psychiatric/Behavioral:        Insomnia        Physical Exam:  /56   Pulse 64   Temp 96.9 °F (36.1 °C) (Oral)   Resp 17   Ht 160 cm (63\")   Wt 66.5 kg (146 lb 9.7 oz)   SpO2 99%   BMI 25.97 kg/m²     Physical Exam  Vitals and nursing note reviewed.   Constitutional:       General: She is not in acute distress.  HENT:      Head: Normocephalic and atraumatic.   Cardiovascular:      Rate and Rhythm: Normal rate and regular rhythm.      Heart sounds: No murmur heard.  Pulmonary:      Effort: No respiratory distress.      Breath sounds: Normal breath sounds.   Abdominal:      Palpations: Abdomen is soft.      Tenderness: There is no abdominal tenderness.   Musculoskeletal:      Cervical back: Neck supple.   Skin:     General: Skin is warm and dry.      Findings: No rash.   Neurological:      General: No focal deficit present.      Mental Status: She is alert and oriented to person, place, and time.                Medications in the Emergency Department:  Medications   sodium chloride 0.9 % flush 10 mL (has no administration in time range)   cefTRIAXone (ROCEPHIN) IVPB 1 g (1 g Intravenous New Bag 4/23/22 1528)        Labs  Lab Results (last 24 hours)     Procedure Component Value Units Date/Time    POC Glucose Once [[id number]]  (Abnormal) Collected: 04/23/22 1230    Specimen: Blood Updated: 04/23/22 1231     Glucose 185 mg/dL      Comment: Serial Number: [id number]:  [id number]       CBC & " Differential [[id number]]  (Normal) Collected: 04/23/22 1252    Specimen: Blood Updated: 04/23/22 1300    Narrative:      The following orders were created for panel order CBC & Differential.  Procedure                               Abnormality         Status                     ---------                               -----------         ------                     CBC Auto Differential[[id number]]        Normal              Final result                 Please view results for these tests on the individual orders.    Comprehensive Metabolic Panel [[id number]]  (Abnormal) Collected: 04/23/22 1252    Specimen: Blood Updated: 04/23/22 1321     Glucose 202 mg/dL      BUN 19 mg/dL      Creatinine 0.84 mg/dL      Sodium 130 mmol/L      Potassium 4.5 mmol/L      Chloride 96 mmol/L      CO2 23.1 mmol/L      Calcium 10.1 mg/dL      Total Protein 6.9 g/dL      Albumin 4.30 g/dL      ALT (SGPT) 85 U/L      AST (SGOT) 46 U/L      Alkaline Phosphatase 63 U/L      Total Bilirubin 0.3 mg/dL      Globulin 2.6 gm/dL      A/G Ratio 1.7 g/dL      BUN/Creatinine Ratio 22.6     Anion Gap 10.9 mmol/L      eGFR 72.6 mL/min/1.73      Comment: National Kidney Foundation and American Society of Nephrology (ASN) Task Force recommended calculation based on the Chronic Kidney Disease Epidemiology Collaboration (CKD-EPI) equation refit without adjustment for race.       Narrative:      GFR Normal >60  Chronic Kidney Disease <60  Kidney Failure <15      Troponin [[id number]]  (Normal) Collected: 04/23/22 1252    Specimen: Blood Updated: 04/23/22 1321     Troponin T <0.010 ng/mL     Narrative:      Troponin T Reference Range:  <= 0.03 ng/mL-   Negative for AMI  >0.03 ng/mL-     Abnormal for myocardial necrosis.  Clinicians would have to utilize clinical acumen, EKG, Troponin and serial changes to determine if it is an Acute Myocardial Infarction or myocardial injury due to an underlying chronic condition.       Results may be falsely decreased if  patient taking Biotin.      Magnesium [[id number]]  (Normal) Collected: 04/23/22 1252    Specimen: Blood Updated: 04/23/22 1321     Magnesium 2.0 mg/dL     CBC Auto Differential [[id number]]  (Normal) Collected: 04/23/22 1252    Specimen: Blood Updated: 04/23/22 1300     WBC 5.83 10*3/mm3      RBC 4.28 10*6/mm3      Hemoglobin 13.6 g/dL      Hematocrit 39.7 %      MCV 92.8 fL      MCH 31.8 pg      MCHC 34.3 g/dL      RDW 13.1 %      RDW-SD 44.5 fl      MPV 9.1 fL      Platelets 309 10*3/mm3      Neutrophil % 66.5 %      Lymphocyte % 24.2 %      Monocyte % 7.4 %      Eosinophil % 1.2 %      Basophil % 0.5 %      Immature Grans % 0.2 %      Neutrophils, Absolute 3.88 10*3/mm3      Lymphocytes, Absolute 1.41 10*3/mm3      Monocytes, Absolute 0.43 10*3/mm3      Eosinophils, Absolute 0.07 10*3/mm3      Basophils, Absolute 0.03 10*3/mm3      Immature Grans, Absolute 0.01 10*3/mm3      nRBC 0.0 /100 WBC     Urinalysis With Microscopic If Indicated (No Culture) - Urine, Clean Catch [[id number]]  (Abnormal) Collected: 04/23/22 1316    Specimen: Urine, Clean Catch Updated: 04/23/22 1333     Color, UA Dark Yellow     Appearance, UA Clear     pH, UA 6.5     Specific Gravity, UA 1.018     Glucose, UA Negative     Ketones, UA Negative     Bilirubin, UA Negative     Blood, UA Negative     Protein, UA Negative     Leuk Esterase, UA Small (1+)     Nitrite, UA Negative     Urobilinogen, UA 1.0 E.U./dL    Urinalysis, Microscopic Only - Urine, Clean Catch [[id number]]  (Abnormal) Collected: 04/23/22 1316    Specimen: Urine, Clean Catch Updated: 04/23/22 1333     RBC, UA 0-2 /HPF      WBC, UA 6-12 /HPF      Bacteria, UA 2+ /HPF      Squamous Epithelial Cells, UA 0-2 /HPF      Hyaline Casts, UA 0-2 /LPF      Methodology Automated Microscopy           Imaging:  CT Head Without Contrast    Result Date: 4/23/2022  PROCEDURE: CT HEAD WO CONTRAST  COMPARISON:  [facility], CT, HEAD W/O CONTRAST, 9/25/2016, 11:50. INDICATIONS: dizzy   PROTOCOL:   Standard imaging protocol performed    RADIATION:   DLP: 953.8 mGy*cm   MA and/or KV was adjusted to minimize radiation dose.     TECHNIQUE: After obtaining the patient's consent, CT images were obtained without non-ionic intravenous contrast material.  FINDINGS:  The cerebral sulci, fissures, ventricles, and basal cisterns are normal for age.  There is no acute hemorrhage, midline shift, or suspicious extra-axial fluid collections.  There are atherosclerotic vascular calcifications in the cavernous carotid arteries.  The patient appears to have had previous cataract surgery.  The visualized paranasal and mastoid sinuses are clear.  The calvarium is unremarkable.       No acute findings.     JONATHAN KABA MD       Electronically Signed and Approved By: JONATHAN KABA MD on 4/23/2022 at 15:32             XR Chest 1 View    Result Date: 4/23/2022  PROCEDURE: XR CHEST 1 VW  COMPARISON: [facility], , CHEST PA/AP & LAT 2V, 9/25/2016, 20:19.  INDICATIONS: Weak/Dizzy/AMS triage protocol  FINDINGS:  The heart size is normal.  The pulmonary vascular markings are normal.  The lungs and pleural spaces are clear of active disease.  The bony thorax is normal for age.       No active disease.       JONATHAN KABA MD       Electronically Signed and Approved By: JONATHAN KABA MD on 4/23/2022 at 13:55               Procedures:  Procedures    Progress  ED Course as of 04/23/22 1558   Sat Apr 23, 2022   1322 EKG: Rate 76, normal P waves, LVH, normal ST segment, normal QT interval, no change from April 25, 2019. [RW]      ED Course User Index  [RW] Tj Lofton MD                            Medical Decision Making:  MDM  Number of Diagnoses or Management Options  Hyponatremia  UTI (urinary tract infection) with pyuria  Diagnosis management comments: Patient presents with weakness and dizziness.  Old records reviewed she has a history of diabetes.  Differential considerations include but are not limited to dehydration  versus electrolyte abnormalities.  Urinalysis is remarkable for bacteria to suggest UTI as a contributing factor to her symptoms from sodium levels diminished at 130.  Troponin is normal making ACS unlikely as a source of her symptoms CBC unremarkable.  CT scan of the brain is read by radiology and reviewed by me did not demonstrate acute intracranial findings.  Chest x-ray did not show acute findings as read by radiology and reviewed by me.  ED course remained stable.  Antibiotics administered and prescribed and she is to be on a water restricted diet to improve her serum sodium level have her level rechecked next week.       Amount and/or Complexity of Data Reviewed  Clinical lab tests: reviewed  Tests in the radiology section of CPT®: reviewed  Tests in the medicine section of CPT®: reviewed    Risk of Complications, Morbidity, and/or Mortality  Presenting problems: high  Management options: low    Patient Progress  Patient progress: stable       Final diagnoses:   UTI (urinary tract infection) with pyuria   Hyponatremia        Disposition:  ED Disposition     ED Disposition   Discharge    Condition   Stable    Comment   --             Documentation assistance provided by Olga Lundberg acting as scribe for Dr. Tj Lofton. Information recorded by the scribe was done at my direction and has been verified and validated by me.              Olga Lundberg  04/23/22 9089       Tj Lofton MD  04/23/22 2487

## 2022-04-23 NOTE — DISCHARGE INSTRUCTIONS
Water restricted diet and have your serum sodium level rechecked next week by your primary care provider.

## 2023-02-25 ENCOUNTER — HOSPITAL ENCOUNTER (EMERGENCY)
Facility: HOSPITAL | Age: 78
Discharge: HOME | End: 2023-02-25
Attending: EMERGENCY MEDICINE | Admitting: EMERGENCY MEDICINE
Payer: MEDICARE

## 2023-02-25 VITALS
BODY MASS INDEX: 22.15 KG/M2 | DIASTOLIC BLOOD PRESSURE: 65 MMHG | TEMPERATURE: 98.6 F | RESPIRATION RATE: 16 BRPM | HEART RATE: 59 BPM | OXYGEN SATURATION: 97 % | WEIGHT: 125 LBS | SYSTOLIC BLOOD PRESSURE: 126 MMHG | HEIGHT: 63 IN

## 2023-02-25 DIAGNOSIS — M54.50 PAIN IN RIGHT LUMBAR REGION OF BACK: Primary | ICD-10-CM

## 2023-02-25 PROCEDURE — 99283 EMERGENCY DEPT VISIT LOW MDM: CPT

## 2023-02-25 PROCEDURE — 96372 THER/PROPH/DIAG INJ SC/IM: CPT

## 2023-02-25 PROCEDURE — 25010000002 ORPHENADRINE CITRATE PER 60 MG: Performed by: EMERGENCY MEDICINE

## 2023-02-25 PROCEDURE — 25010000002 KETOROLAC TROMETHAMINE PER 15 MG: Performed by: EMERGENCY MEDICINE

## 2023-02-25 RX ORDER — ORPHENADRINE CITRATE 100 MG/1
100 TABLET, EXTENDED RELEASE ORAL 2 TIMES DAILY
Qty: 30 TABLET | Refills: 0 | Status: SHIPPED | OUTPATIENT
Start: 2023-02-25

## 2023-02-25 RX ORDER — ORPHENADRINE CITRATE 30 MG/ML
60 INJECTION INTRAMUSCULAR; INTRAVENOUS ONCE
Status: COMPLETED | OUTPATIENT
Start: 2023-02-25 | End: 2023-02-25

## 2023-02-25 RX ORDER — OXYCODONE AND ACETAMINOPHEN 10; 325 MG/1; MG/1
1 TABLET ORAL ONCE
Status: COMPLETED | OUTPATIENT
Start: 2023-02-25 | End: 2023-02-25

## 2023-02-25 RX ORDER — HYDROCODONE BITARTRATE AND ACETAMINOPHEN 5; 325 MG/1; MG/1
1 TABLET ORAL 3 TIMES DAILY PRN
COMMUNITY
Start: 2023-02-16

## 2023-02-25 RX ORDER — PRIMIDONE 50 MG/1
50 TABLET ORAL 3 TIMES DAILY
COMMUNITY

## 2023-02-25 RX ORDER — TRAMADOL HYDROCHLORIDE 50 MG/1
50 TABLET ORAL 4 TIMES DAILY
COMMUNITY

## 2023-02-25 RX ORDER — KETOROLAC TROMETHAMINE 10 MG/1
10 TABLET, FILM COATED ORAL EVERY 6 HOURS PRN
Qty: 15 TABLET | Refills: 0 | Status: SHIPPED | OUTPATIENT
Start: 2023-02-25

## 2023-02-25 RX ORDER — LIDOCAINE 50 MG/G
1 PATCH TOPICAL EVERY 12 HOURS
COMMUNITY

## 2023-02-25 RX ORDER — KETOROLAC TROMETHAMINE 30 MG/ML
30 INJECTION, SOLUTION INTRAMUSCULAR; INTRAVENOUS ONCE
Status: COMPLETED | OUTPATIENT
Start: 2023-02-25 | End: 2023-02-25

## 2023-02-25 RX ORDER — TOLTERODINE 2 MG/1
1 CAPSULE, EXTENDED RELEASE ORAL DAILY
COMMUNITY

## 2023-02-25 RX ORDER — TOPIRAMATE 50 MG/1
50 TABLET, FILM COATED ORAL 2 TIMES DAILY
COMMUNITY

## 2023-02-25 RX ADMIN — OXYCODONE AND ACETAMINOPHEN 1 TABLET: 10; 325 TABLET ORAL at 20:37

## 2023-02-25 RX ADMIN — KETOROLAC TROMETHAMINE 30 MG: 30 INJECTION, SOLUTION INTRAMUSCULAR; INTRAVENOUS at 20:36

## 2023-02-25 RX ADMIN — ORPHENADRINE CITRATE 60 MG: 30 INJECTION INTRAMUSCULAR; INTRAVENOUS at 20:36

## 2023-03-27 ENCOUNTER — REFERRAL TRIAGE (OUTPATIENT)
Dept: CASE MANAGEMENT | Facility: OTHER | Age: 78
End: 2023-03-27
Payer: MEDICARE

## 2023-03-27 ENCOUNTER — TELEPHONE (OUTPATIENT)
Dept: CASE MANAGEMENT | Facility: OTHER | Age: 78
End: 2023-03-27
Payer: MEDICARE

## 2023-03-27 ENCOUNTER — PRIOR AUTHORIZATION (OUTPATIENT)
Dept: FAMILY MEDICINE CLINIC | Facility: CLINIC | Age: 78
End: 2023-03-27

## 2023-03-27 ENCOUNTER — OFFICE VISIT (OUTPATIENT)
Dept: FAMILY MEDICINE CLINIC | Facility: CLINIC | Age: 78
End: 2023-03-27
Payer: MEDICARE

## 2023-03-27 VITALS
DIASTOLIC BLOOD PRESSURE: 80 MMHG | WEIGHT: 118 LBS | BODY MASS INDEX: 20.91 KG/M2 | SYSTOLIC BLOOD PRESSURE: 124 MMHG | HEIGHT: 63 IN | TEMPERATURE: 97.3 F

## 2023-03-27 DIAGNOSIS — Z00.00 ENCOUNTER FOR MEDICAL EXAMINATION TO ESTABLISH CARE: Primary | ICD-10-CM

## 2023-03-27 DIAGNOSIS — K59.03 DRUG-INDUCED CONSTIPATION: ICD-10-CM

## 2023-03-27 DIAGNOSIS — Z79.4 TYPE 2 DIABETES MELLITUS WITHOUT COMPLICATION, WITH LONG-TERM CURRENT USE OF INSULIN: ICD-10-CM

## 2023-03-27 DIAGNOSIS — M54.50 CHRONIC RIGHT-SIDED LOW BACK PAIN WITHOUT SCIATICA: ICD-10-CM

## 2023-03-27 DIAGNOSIS — R26.89 DECREASED MOBILITY: ICD-10-CM

## 2023-03-27 DIAGNOSIS — Z11.59 ENCOUNTER FOR HEPATITIS C SCREENING TEST FOR LOW RISK PATIENT: ICD-10-CM

## 2023-03-27 DIAGNOSIS — R63.4 RAPID WEIGHT LOSS: ICD-10-CM

## 2023-03-27 DIAGNOSIS — E11.9 TYPE 2 DIABETES MELLITUS WITHOUT COMPLICATION, WITH LONG-TERM CURRENT USE OF INSULIN: ICD-10-CM

## 2023-03-27 DIAGNOSIS — E78.2 MIXED HYPERLIPIDEMIA: ICD-10-CM

## 2023-03-27 DIAGNOSIS — G89.29 CHRONIC RIGHT-SIDED LOW BACK PAIN WITHOUT SCIATICA: ICD-10-CM

## 2023-03-27 DIAGNOSIS — R82.5 POSITIVE URINE DRUG SCREEN: ICD-10-CM

## 2023-03-27 LAB
ALBUMIN UR-MCNC: 1.4 MG/DL
AMPHET+METHAMPHET UR QL: NEGATIVE
AMPHETAMINE INTERNAL CONTROL: ABNORMAL
AMPHETAMINES UR QL: NEGATIVE
BARBITURATE INTERNAL CONTROL: ABNORMAL
BARBITURATES UR QL SCN: POSITIVE
BASOPHILS # BLD AUTO: 0.03 10*3/MM3 (ref 0–0.2)
BASOPHILS NFR BLD AUTO: 0.4 % (ref 0–1.5)
BENZODIAZ UR QL SCN: NEGATIVE
BENZODIAZEPINE INTERNAL CONTROL: ABNORMAL
BUPRENORPHINE INTERNAL CONTROL: ABNORMAL
BUPRENORPHINE SERPL-MCNC: NEGATIVE NG/ML
CANNABINOIDS SERPL QL: NEGATIVE
COCAINE INTERNAL CONTROL: ABNORMAL
COCAINE UR QL: NEGATIVE
DEPRECATED RDW RBC AUTO: 43.3 FL (ref 37–54)
EOSINOPHIL # BLD AUTO: 0.18 10*3/MM3 (ref 0–0.4)
EOSINOPHIL NFR BLD AUTO: 2.5 % (ref 0.3–6.2)
ERYTHROCYTE [DISTWIDTH] IN BLOOD BY AUTOMATED COUNT: 12.5 % (ref 12.3–15.4)
EXPIRATION DATE: ABNORMAL
HBA1C MFR BLD: 6.3 % (ref 4.8–5.6)
HCT VFR BLD AUTO: 42 % (ref 34–46.6)
HGB BLD-MCNC: 14.6 G/DL (ref 12–15.9)
IMM GRANULOCYTES # BLD AUTO: 0.03 10*3/MM3 (ref 0–0.05)
IMM GRANULOCYTES NFR BLD AUTO: 0.4 % (ref 0–0.5)
LYMPHOCYTES # BLD AUTO: 1.12 10*3/MM3 (ref 0.7–3.1)
LYMPHOCYTES NFR BLD AUTO: 15.5 % (ref 19.6–45.3)
Lab: ABNORMAL
MCH RBC QN AUTO: 32.8 PG (ref 26.6–33)
MCHC RBC AUTO-ENTMCNC: 34.8 G/DL (ref 31.5–35.7)
MCV RBC AUTO: 94.4 FL (ref 79–97)
MDMA (ECSTASY) INTERNAL CONTROL: ABNORMAL
MDMA UR QL SCN: NEGATIVE
METHADONE INTERNAL CONTROL: ABNORMAL
METHADONE UR QL SCN: NEGATIVE
METHAMPHETAMINE INTERNAL CONTROL: ABNORMAL
MONOCYTES # BLD AUTO: 0.5 10*3/MM3 (ref 0.1–0.9)
MONOCYTES NFR BLD AUTO: 6.9 % (ref 5–12)
NEUTROPHILS NFR BLD AUTO: 5.37 10*3/MM3 (ref 1.7–7)
NEUTROPHILS NFR BLD AUTO: 74.3 % (ref 42.7–76)
NRBC BLD AUTO-RTO: 0 /100 WBC (ref 0–0.2)
OPIATES INTERNAL CONTROL: ABNORMAL
OPIATES UR QL: NEGATIVE
OXYCODONE INTERNAL CONTROL: ABNORMAL
OXYCODONE UR QL SCN: NEGATIVE
PCP UR QL SCN: NEGATIVE
PHENCYCLIDINE INTERNAL CONTROL: ABNORMAL
PLATELET # BLD AUTO: 315 10*3/MM3 (ref 140–450)
PMV BLD AUTO: 10 FL (ref 6–12)
RBC # BLD AUTO: 4.45 10*6/MM3 (ref 3.77–5.28)
THC INTERNAL CONTROL: ABNORMAL
WBC NRBC COR # BLD: 7.23 10*3/MM3 (ref 3.4–10.8)

## 2023-03-27 PROCEDURE — 80061 LIPID PANEL: CPT | Performed by: NURSE PRACTITIONER

## 2023-03-27 PROCEDURE — 83036 HEMOGLOBIN GLYCOSYLATED A1C: CPT | Performed by: NURSE PRACTITIONER

## 2023-03-27 PROCEDURE — 80053 COMPREHEN METABOLIC PANEL: CPT | Performed by: NURSE PRACTITIONER

## 2023-03-27 PROCEDURE — 84443 ASSAY THYROID STIM HORMONE: CPT | Performed by: NURSE PRACTITIONER

## 2023-03-27 PROCEDURE — 85025 COMPLETE CBC W/AUTO DIFF WBC: CPT | Performed by: NURSE PRACTITIONER

## 2023-03-27 PROCEDURE — 82043 UR ALBUMIN QUANTITATIVE: CPT | Performed by: NURSE PRACTITIONER

## 2023-03-27 PROCEDURE — 86803 HEPATITIS C AB TEST: CPT | Performed by: NURSE PRACTITIONER

## 2023-03-27 RX ORDER — KETOROLAC TROMETHAMINE 30 MG/ML
30 INJECTION, SOLUTION INTRAMUSCULAR; INTRAVENOUS ONCE
Status: COMPLETED | OUTPATIENT
Start: 2023-03-27 | End: 2023-03-27

## 2023-03-27 RX ORDER — TIZANIDINE 4 MG/1
4 TABLET ORAL NIGHTLY PRN
Qty: 30 TABLET | Refills: 1 | Status: SHIPPED | OUTPATIENT
Start: 2023-03-27

## 2023-03-27 RX ORDER — DOCUSATE SODIUM 100 MG/1
CAPSULE, LIQUID FILLED ORAL
COMMUNITY
Start: 2023-03-16

## 2023-03-27 RX ORDER — GABAPENTIN 300 MG/1
300-600 CAPSULE ORAL NIGHTLY
Qty: 60 CAPSULE | Refills: 1 | Status: SHIPPED | OUTPATIENT
Start: 2023-03-27

## 2023-03-27 RX ORDER — LIDOCAINE 50 MG/G
1 PATCH TOPICAL EVERY 24 HOURS
Qty: 30 PATCH | Refills: 2 | Status: SHIPPED | OUTPATIENT
Start: 2023-03-27

## 2023-03-27 RX ORDER — OXYCODONE HYDROCHLORIDE AND ACETAMINOPHEN 5; 325 MG/1; MG/1
1 TABLET ORAL EVERY 6 HOURS PRN
COMMUNITY

## 2023-03-27 RX ORDER — LISINOPRIL 5 MG/1
TABLET ORAL
COMMUNITY

## 2023-03-27 RX ORDER — ONDANSETRON 4 MG/1
TABLET, FILM COATED ORAL
COMMUNITY
Start: 2023-03-16

## 2023-03-27 RX ORDER — METHYLPREDNISOLONE ACETATE 40 MG/ML
40 INJECTION, SUSPENSION INTRA-ARTICULAR; INTRALESIONAL; INTRAMUSCULAR; SOFT TISSUE ONCE
Status: COMPLETED | OUTPATIENT
Start: 2023-03-27 | End: 2023-03-27

## 2023-03-27 RX ADMIN — METHYLPREDNISOLONE ACETATE 40 MG: 40 INJECTION, SUSPENSION INTRA-ARTICULAR; INTRALESIONAL; INTRAMUSCULAR; SOFT TISSUE at 10:32

## 2023-03-27 RX ADMIN — KETOROLAC TROMETHAMINE 30 MG: 30 INJECTION, SOLUTION INTRAMUSCULAR; INTRAVENOUS at 10:32

## 2023-03-27 NOTE — PROGRESS NOTES
Chief Complaint  Establish Care (Wants help with finding a nursing home ), Back Pain (Can't sit without extreme pain ), Weight Loss (About 30 pound in 6 weeks ), Anorexia (States food doesn't taste the same, has no appetite to eat ), and Diabetes    Subjective        Medical History: has a past medical history of Arthritis, DDD (degenerative disc disease), lumbar, Diabetes (HCC), GERD (gastroesophageal reflux disease), Hyperlipidemia, Palsy (HCC), Poor circulation, Sleep apnea, and Stress incontinence.     Surgical History: has a past surgical history that includes Lumbar fusion (2013); Cholecystectomy (1998); Hysterectomy; Knee surgery; Knee surgery; Total hip arthroplasty (Right, 05/06/2019); and Cataract extraction (Bilateral).     Family History: family history includes Cancer in her brother; Diabetes in her father and sister; Heart disease in her father and mother.     Social History: reports that she has never smoked. She has never used smokeless tobacco. She reports that she does not drink alcohol and does not use drugs.    Maureen Hernandez presents to [facility]  History of Present Illness  Patient is a 78-year-old female who comes in to [facility].  She was previously seen in this office by Dr. Camp in 2019 but decided to change care to Dr. Hampton.  Patient has a current history of diabetes, insomnia, chronic back pain, and hyperlipidemia.    Patient comes in accompanied by daughter, last labs were done about 4 months ago.  Patient has not been taking her diabetes medication.  Per daughter about 6 weeks ago patient's back started hurting her severely, she does have a history of a spinal fusion, but patient has not been able to get out of bed due to the back pain.  Pain has been in severity severe patient can only lay flat to have any improvement in pain, she has able to get up and use the bathroom, but has to lay right back down.  Patient was seen in the ER previously,  she was given a Toradol injection, and sent home on pain medication patient states the Toradol injection did help some, she is not currently on any type of anti-inflammatory at this time.  I do not have any current baseline labs to check kidney function so we will hold on any anti-inflammatories at this time.  No recent x-ray has been done in the back.  She has had an MRI last done March 2021, unable to view image due to imaging being done in [place].  She has been taking Percocets 5 mg every 4 hours for the pain without any significant improvement.  Patient has seen pain management in the past, she is seen neurosurgery and neurology.  She rates her pain today at a 8 out of 10.  She is laying on the table in the exam room, only able to sit up for no less than a few seconds before having to lay back down.  She denies any bowel bladder incontinence.  No perianal numbness.  Has not fallen due to the back pain.  Patient also has a current history of essential tremors that include entire body hands and legs.  Patient has been on medication in the past but not currently on anything for the tremors.    Patient is complaining of constipation especially since she has been doing nothing but laying in bed and taking pain medication.  She states that she has had to disimpact herself to help go to the bathroom.  She is currently on Colace twice daily 100 mg without any improvement of the constipation.    Per daughter patient has lost about 30 pounds in the past 6 weeks.  Patient does not want to eat, and since she has stopped eating nothing sounds good.  She is trying to drink at least 2 protein drinks a day.  She admits she probably is not drinking enough water.    Patient does have a current history of type 2 diabetes.  She is not currently taking her diabetic medications due to not eating and rapid weight loss she stopped the medications.  Patient is unsure what her last hemoglobin A1c was at her previous provider's  "office.  She previously has been on metformin and glyburide, currently not on anything, she does have a current history of hypertension and hyperlipidemia.      Objective   Vital Signs:   /80   Temp 97.3 °F (36.3 °C)   Ht 160 cm (63\")   Wt 53.5 kg (118 lb)   BMI 20.90 kg/m²       Wt Readings from Last 3 Encounters:   03/27/23 53.5 kg (118 lb)   02/25/23 56.7 kg (125 lb)   04/23/22 66.5 kg (146 lb 9.7 oz)        BP Readings from Last 3 Encounters:   03/27/23 124/80   02/25/23 126/65   04/23/22 109/56        BMI is within normal parameters. No other follow-up for BMI required.       Physical Exam  Vitals reviewed.   Constitutional:       General: She is in acute distress (Secondary to back pain).      Appearance: Normal appearance. She is well-developed.   HENT:      Head: Normocephalic and atraumatic.   Eyes:      Conjunctiva/sclera: Conjunctivae normal.      Pupils: Pupils are equal, round, and reactive to light.   Cardiovascular:      Rate and Rhythm: Normal rate and regular rhythm.      Heart sounds: No murmur heard.  Pulmonary:      Effort: Pulmonary effort is normal.      Breath sounds: Normal breath sounds. No wheezing or rhonchi.   Abdominal:      General: Abdomen is flat. Bowel sounds are decreased.      Palpations: Abdomen is soft.   Musculoskeletal:      Right lower leg: No edema.      Left lower leg: No edema.   Skin:     General: Skin is warm and dry.   Neurological:      Mental Status: She is alert and oriented to person, place, and time.      Gait: Gait abnormal (Slow-moving gait with use of cane).   Psychiatric:         Mood and Affect: Mood and affect normal.         Behavior: Behavior normal.         Thought Content: Thought content normal.         Judgment: Judgment normal.        Result Review :  {The following data was reviewed by OLIVIA Ross on 03/27/2023.  Common labs    Common Labs 4/23/22 4/23/22    1252 1252   Glucose  202 (A)   BUN  19   Creatinine  0.84   Sodium  " 130 (A)   Potassium  4.5   Chloride  96 (A)   Calcium  10.1   Albumin  4.30   Total Bilirubin  0.3   Alkaline Phosphatase  63   AST (SGOT)  46 (A)   ALT (SGPT)  85 (A)   WBC 5.83    Hemoglobin 13.6    Hematocrit 39.7    Platelets 309    (A) Abnormal value            Data reviewed: Most previous ER visit           Last Urine Toxicity     LAST URINE TOXICITY RESULTS Latest Ref Rng & Units 3/27/2023    AMPHETAMINES SCREEN, URINE Negative Negative    BARBITURATES SCREEN Negative Positive(A)    BENZODIAZEPINE SCREEN, URINE Negative Negative    BUPRENORPHINEUR Negative Negative    COCAINE SCREEN, URINE Negative Negative    METHADONE SCREEN, URINE Negative Negative    METHAMPHETAMINEUR Negative Negative          Current Outpatient Medications on File Prior to Visit   Medication Sig Dispense Refill   • atorvastatin (LIPITOR) 40 MG tablet Take 1 tablet by mouth Daily.     • LANTUS SOLOSTAR 100 UNIT/ML injection pen INJECT 15 UNITS SUBCUTANEOUSLY EVERY DAY AT BEDTIME FOR 30 DAYS     • lidocaine (LIDODERM) 5 % Place 1 patch on the skin as directed by provider Every 12 (Twelve) Hours.     • magnesium oxide (MAG-OX) 400 MG tablet Take 1 tablet by mouth Every Night.     • metFORMIN (GLUCOPHAGE) 1000 MG tablet Take 1 tablet by mouth 2 (Two) Times a Day With Meals. TAKES 2 TABS TWICE PER DAY     • orphenadrine (NORFLEX) 100 MG 12 hr tablet Take 1 tablet by mouth 2 (Two) Times a Day. 30 tablet 0   • oxyCODONE-acetaminophen (PERCOCET) 5-325 MG per tablet Take 1 tablet by mouth Every 6 (Six) Hours As Needed.     • primidone (MYSOLINE) 50 MG tablet Take 1 tablet by mouth 3 (Three) Times a Day.     • simvastatin (ZOCOR) 20 MG tablet Take 1 tablet by mouth Every Night.     • tolterodine LA (DETROL LA) 2 MG 24 hr capsule Take 1 capsule by mouth Daily.     • aspirin 325 MG EC tablet Take 1 tablet by mouth 2 (Two) Times a Day. (Patient not taking: Reported on 3/27/2023) 60 tablet 1   • cephalexin (KEFLEX) 500 MG capsule Take 1 capsule by  mouth 4 (Four) Times a Day. (Patient not taking: Reported on 3/27/2023) 28 capsule 0   • docusate sodium (COLACE) 100 MG capsule TAKE 1 CAPSULE BY MOUTH TWICE DAILY FOR 30 DAYS     • eszopiclone (Lunesta) 1 MG tablet Take 1 tablet by mouth Every Night. Take immediately before bedtime (Patient not taking: Reported on 3/27/2023) 10 tablet 0   • glimepiride (AMARYL) 4 MG tablet Take 4 mg by mouth Daily. (Patient not taking: Reported on 3/27/2023)     • HYDROcodone-acetaminophen (NORCO) 5-325 MG per tablet Take 1 tablet by mouth 3 (Three) Times a Day As Needed. (Patient not taking: Reported on 3/27/2023)     • ketorolac (TORADOL) 10 MG tablet Take 1 tablet by mouth Every 6 (Six) Hours As Needed for Moderate Pain. (Patient not taking: Reported on 3/27/2023) 15 tablet 0   • lisinopril (PRINIVIL,ZESTRIL) 5 MG tablet lisinopril 5 mg tablet   TAKE 1 TABLET BY MOUTH ONCE DAILY FOR 90 DAYS     • metFORMIN (GLUCOPHAGE) 500 MG tablet metformin 500 mg tablet   TAKE 2 TABLETS BY MOUTH TWICE DAILY (Patient not taking: Reported on 3/27/2023)     • ondansetron (ZOFRAN) 4 MG tablet TAKE 1 TABLET BY MOUTH EVERY 8 HOURS AS NEEDED FOR 30 DAYS     • topiramate (TOPAMAX) 50 MG tablet Take 50 mg by mouth 2 (Two) Times a Day. (Patient not taking: Reported on 3/27/2023)     • traMADol (ULTRAM) 50 MG tablet Take 50 mg by mouth 4 (Four) Times a Day. (Patient not taking: Reported on 3/27/2023)     • [DISCONTINUED] gabapentin (NEURONTIN) 100 MG capsule Take 100 mg by mouth 3 (Three) Times a Day. (Patient not taking: Reported on 3/27/2023)       Current Facility-Administered Medications on File Prior to Visit   Medication Dose Route Frequency Provider Last Rate Last Admin   • mupirocin (BACTROBAN) 2 % nasal ointment   Nasal BID Estevan Sharma MD            Assessment and Plan  Diagnoses and all orders for this visit:    1. Encounter for medical examination to establish care (Primary)    2. Type 2 diabetes mellitus without complication, with  long-term current use of insulin (HCC)  -     CBC Auto Differential  -     Comprehensive Metabolic Panel  -     Hemoglobin A1c  -     Lipid Panel  -     MicroAlbumin, Urine, Random - Urine, Clean Catch  -     TSH    3. Mixed hyperlipidemia    4. Drug-induced constipation    5. Rapid weight loss    6. Chronic right-sided low back pain without sciatica  -     MRI Lumbar Spine Without Contrast; Future  -     Ambulatory Referral to Chronic Care Management  -     Ambulatory Referral to Home Health  -     methylPREDNISolone acetate (DEPO-medrol) injection 40 mg  -     ketorolac (TORADOL) injection 30 mg  -     POC Urine Drug Screen [facility]  -     gabapentin (NEURONTIN) 300 MG capsule; Take 1-2 capsules by mouth Every Night.  Dispense: 60 capsule; Refill: 1    7. Decreased mobility due to pain in back  -     MRI Lumbar Spine Without Contrast; Future  -     Ambulatory Referral to Chronic Care Management  -     Ambulatory Referral to Home Health  -     POC Urine Drug Screen [facility]  -     gabapentin (NEURONTIN) 300 MG capsule; Take 1-2 capsules by mouth Every Night.  Dispense: 60 capsule; Refill: 1    8. Encounter for hepatitis C screening test for low risk patient  -     Hepatitis C antibody    9. Positive urine drug screen  -     Urine Drug Screen Confirmation - Urine, Clean Catch    Other orders  -     linaclotide (Linzess) 145 MCG capsule capsule; Take 1 capsule by mouth Every Morning Before Breakfast.  Dispense: 30 capsule; Refill: 1  -     lidocaine (LIDODERM) 5 %; Place 1 patch on the skin as directed by provider Daily. Remove & Discard patch within 12 hours or as directed by MD  Dispense: 30 patch; Refill: 2  -     tiZANidine (Zanaflex) 4 MG tablet; Take 1 tablet by mouth At Night As Needed for Muscle Spasms.  Dispense: 30 tablet; Refill: 1    A long discussion was had with patient and daughter.  I am going to refer her over to critical care management to be followed more closely.  Also placed a  referral for home health for PT OT evaluation due to patient's limited mobility, patient is not currently driving and was advised not to be driving for the next 8 weeks at least.  We will start on Linzess to help with the drug-induced constipation, also instructed to get some mineral oil to take twice daily.  We will send over her Zanaflex, along with lidocaine patches.  Will increase gabapentin to 3 to 600 mg at bedtime.  We will get MRI back to rule out any acute bony abnormality.  Discussed go to the ER if condition worsens.  Long discussion about protein malnutrition, and increased calories.  Discussed also to get IV hydration to help with hydration.  We will get baseline labs today to determine what patient's A1c, kidney, and liver functioning is doing.  We will have patient follow-up in 1 month for further management and see how she is feeling.  Ample opportunity was given for patient and daughter to discuss concerns.  Patient and daughter both verbalized understanding agreeable treatment plan.      I spent 68 minutes caring for Maureen on this date of service. This time includes time spent by me in the following activities:reviewing tests, obtaining and/or reviewing a separately obtained history, performing a medically appropriate examination and/or evaluation , counseling and educating the patient/family/caregiver, ordering medications, tests, or procedures, referring and communicating with other health care professionals , documenting information in the medical record and care coordination  Follow Up   Return in about 1 month (around 4/27/2023) for Recheck.  Patient was given instructions and counseling regarding her condition or for health maintenance advice. Please see specific information pulled into the AVS if appropriate.       Part of this note may be electronic transcription/translation of spoken language to printed text using the Dragon dictation system

## 2023-03-28 ENCOUNTER — PATIENT OUTREACH (OUTPATIENT)
Dept: CASE MANAGEMENT | Facility: OTHER | Age: 78
End: 2023-03-28
Payer: MEDICARE

## 2023-03-28 DIAGNOSIS — R53.1 GENERALIZED WEAKNESS: ICD-10-CM

## 2023-03-28 DIAGNOSIS — Z79.4 TYPE 2 DIABETES MELLITUS WITHOUT COMPLICATION, WITH LONG-TERM CURRENT USE OF INSULIN: ICD-10-CM

## 2023-03-28 DIAGNOSIS — G89.29 OTHER CHRONIC PAIN: Primary | ICD-10-CM

## 2023-03-28 DIAGNOSIS — M51.36 DEGENERATIVE DISC DISEASE, LUMBAR: ICD-10-CM

## 2023-03-28 DIAGNOSIS — E11.9 TYPE 2 DIABETES MELLITUS WITHOUT COMPLICATION, WITH LONG-TERM CURRENT USE OF INSULIN: ICD-10-CM

## 2023-03-28 LAB
ALBUMIN SERPL-MCNC: 4.2 G/DL (ref 3.5–5.2)
ALBUMIN/GLOB SERPL: 1.5 G/DL
ALP SERPL-CCNC: 68 U/L (ref 39–117)
ALT SERPL W P-5'-P-CCNC: 57 U/L (ref 1–33)
ANION GAP SERPL CALCULATED.3IONS-SCNC: 13 MMOL/L (ref 5–15)
AST SERPL-CCNC: 41 U/L (ref 1–32)
BILIRUB SERPL-MCNC: 0.3 MG/DL (ref 0–1.2)
BUN SERPL-MCNC: 18 MG/DL (ref 8–23)
BUN/CREAT SERPL: 26.1 (ref 7–25)
CALCIUM SPEC-SCNC: 9.9 MG/DL (ref 8.6–10.5)
CHLORIDE SERPL-SCNC: 90 MMOL/L (ref 98–107)
CHOLEST SERPL-MCNC: 112 MG/DL (ref 0–200)
CO2 SERPL-SCNC: 27 MMOL/L (ref 22–29)
CREAT SERPL-MCNC: 0.69 MG/DL (ref 0.57–1)
EGFRCR SERPLBLD CKD-EPI 2021: 90.1 ML/MIN/1.73
GLOBULIN UR ELPH-MCNC: 2.8 GM/DL
GLUCOSE SERPL-MCNC: 261 MG/DL (ref 65–99)
HCV AB SER DONR QL: NORMAL
HDLC SERPL-MCNC: 39 MG/DL (ref 40–60)
LDLC SERPL CALC-MCNC: 48 MG/DL (ref 0–100)
LDLC/HDLC SERPL: 1.12 {RATIO}
POTASSIUM SERPL-SCNC: 4.1 MMOL/L (ref 3.5–5.2)
PROT SERPL-MCNC: 7 G/DL (ref 6–8.5)
SODIUM SERPL-SCNC: 130 MMOL/L (ref 136–145)
TRIGL SERPL-MCNC: 146 MG/DL (ref 0–150)
TSH SERPL DL<=0.05 MIU/L-ACNC: 2.26 UIU/ML (ref 0.27–4.2)
VLDLC SERPL-MCNC: 25 MG/DL (ref 5–40)

## 2023-03-28 NOTE — TELEPHONE ENCOUNTER
CoverMyMeds    Key: [id number] - [place] Case ID: [id number] - Rx #: [id number]  Drug - Lidocaine 5% patches    Outcome  Approved on March 27, 2023  PA Case: [id number]  Status: Approved  Coverage Starts on: 01/01/2023 12:00:00 AM  Coverage Ends on: 12/31/2023 12:00:00 AM

## 2023-03-28 NOTE — OUTREACH NOTE
AMBULATORY CASE MANAGEMENT NOTE    Name and Relationship of Patient/Support Person: Ruth Sharma - Emergency Contact    CCM Interim Update    PCP Referral     Spoke with Daughter (Ruth) - verbal consent obtained for CCM program     Patient lives alone, daughter lives nearby and is primary caregiver.     Daughter states they need Home Health assistance - PT/OT Eval & Nursing, No DME needed at this time.     Daughter reports patient has lost a significant amount of weight in a short period of time due to being bedridden since injury. Patient severely deconditioned.     SW Referral placed for food assistance       Adult Patient Profile  Questions/Answers    Flowsheet Row Most Recent Value   Symptoms/Conditions Managed at Home chronic pain, diabetes, type 2, musculoskeletal   Barriers to Managing Health stress of chronic illness, understanding health advice, medication, inability to prepare, medication side effects, age   Diabetes Management Strategies insulin therapy, routine screenings, medication therapy   Diabetes Self-Management Outcome 4 (good)   Musculoskeletal Symptoms/Conditions mobility limited, frailty syndrome   Musculoskeletal Management Strategies adequate rest, medical device, medication therapy, routine screening   Musculoskeletal Self-Management Outcome 2 (bad)   Chronic Pain Frequency constant   Chronic Pain Aggravating Factors activity   Chronic Pain Management Strategies medication therapy, positioning   Chronic Pain Self-Management Outcome 2 (bad)   Identifying Health Goals keep illness under control   Source of Information family   Nutrition Risk Screen reduced oral intake over the last month, unintentional loss of 10 lbs or more in the past 2 months   Have you recently lost weight without trying?  If yes, how much weight have you lost? 2--> Yes, 14-23 lbs   Have you been eating poorly because of a decreased appetite? 1--> Yes   MST score 3             SDOH updated and reviewed with the patient  during this program:  Financial Resource Strain: Low Risk    • Difficulty of Paying Living Expenses: Not very hard      Physical Activity: Inactive   • Days of Exercise per Week: 0 days   • Minutes of Exercise per Session: 0 min      Food Insecurity: No Food Insecurity   • Worried About Running Out of Food in the Last Year: Never true   • Ran Out of Food in the Last Year: Never true      Social Connections: Moderately Integrated   • Frequency of Communication with Friends and Family: More than three times a week   • Frequency of Social Gatherings with Friends and Family: More than three times a week   • Attends Holiness Services: More than 4 times per year   • Active Member of Clubs or Organizations: Yes   • Attends Club or Organization Meetings: More than 4 times per year   • Marital Status:       Transportation Needs: No Transportation Needs   • Lack of Transportation (Medical): No   • Lack of Transportation (Non-Medical): No      Housing Stability: Low Risk    • Unable to Pay for Housing in the Last Year: No   • Number of Places Lived in the Last Year: 1   • Unstable Housing in the Last Year: No      Stress: No Stress Concern Present   • Feeling of Stress : Not at all           Mimi IRENE  Ambulatory Case Management    3/28/2023, 10:17 EDT

## 2023-03-29 ENCOUNTER — TELEPHONE (OUTPATIENT)
Dept: FAMILY MEDICINE CLINIC | Facility: CLINIC | Age: 78
End: 2023-03-29
Payer: MEDICARE

## 2023-03-29 ENCOUNTER — PATIENT OUTREACH (OUTPATIENT)
Dept: CASE MANAGEMENT | Facility: OTHER | Age: 78
End: 2023-03-29
Payer: MEDICARE

## 2023-03-29 NOTE — OUTREACH NOTE
Social Work Assessment  Questions/Answers    Flowsheet Row Most Recent Value   Referral Source nursing   Reason for Consult community resources   Preferred Language English   Current Living Arrangements home   Primary Care Provided by child(cheryl)   Quality of Family Relationships helpful   Source of Income social security   Application for Public Assistance obtained public assistance pending number        SDOH updated and reviewed with the patient during this program:  Financial Resource Strain: Low Risk    • Difficulty of Paying Living Expenses: Not very hard      Food Insecurity: No Food Insecurity   • Worried About Running Out of Food in the Last Year: Never true   • Ran Out of Food in the Last Year: Never true      Transportation Needs: No Transportation Needs   • Lack of Transportation (Medical): No   • Lack of Transportation (Non-Medical): No      Housing Stability: Low Risk    • Unable to Pay for Housing in the Last Year: No   • Number of Places Lived in the Last Year: 1   • Unstable Housing in the Last Year: No     Patient Outreach    MSW sent patient a [employer] message with resources for food supports and possibly caregiver resources if needed. MSW available should patient have further needs.     Sury LISA -   Ambulatory Case Management    3/29/2023, 13:56 EDT

## 2023-03-31 ENCOUNTER — PATIENT OUTREACH (OUTPATIENT)
Dept: CASE MANAGEMENT | Facility: OTHER | Age: 78
End: 2023-03-31
Payer: MEDICARE

## 2023-03-31 ENCOUNTER — TELEPHONE (OUTPATIENT)
Dept: CASE MANAGEMENT | Facility: OTHER | Age: 78
End: 2023-03-31
Payer: MEDICARE

## 2023-03-31 DIAGNOSIS — G89.29 OTHER CHRONIC PAIN: ICD-10-CM

## 2023-03-31 DIAGNOSIS — E11.9 TYPE 2 DIABETES MELLITUS WITHOUT COMPLICATION, WITH LONG-TERM CURRENT USE OF INSULIN: Primary | ICD-10-CM

## 2023-03-31 DIAGNOSIS — Z79.4 TYPE 2 DIABETES MELLITUS WITHOUT COMPLICATION, WITH LONG-TERM CURRENT USE OF INSULIN: Primary | ICD-10-CM

## 2023-03-31 DIAGNOSIS — R53.1 GENERALIZED WEAKNESS: ICD-10-CM

## 2023-03-31 PROCEDURE — 99490 CHRNC CARE MGMT STAFF 1ST 20: CPT | Performed by: NURSE PRACTITIONER

## 2023-03-31 NOTE — OUTREACH NOTE
[facility] End of Month Documentation    This Chronic Medical Management Care Plan for Maureen Hernandez, 78 y.o. female, has been a new plan of care implemented; monitored and managed; reviewed and a new plan of care implemented for the month of March.  A cumulative time of 22 minutes was spent on this patient record this month, including chart review; phone call with care giver; electronic communication with primary care provider; phone call with other providers.    Regarding the patient's problems: has Pain of right sacroiliac joint; Degenerative disc disease, lumbar; Primary osteoarthritis of right hip; and Hip pain, right on their problem list., the following items were addressed: medical records; medications and any changes can be found within the plan section of the note.  A detailed listing of time spent for chronic care management is tracked within each outreach encounter.  Current medications include:  has a current medication list which includes the following prescription(s): aspirin, atorvastatin, cephalexin, docusate sodium, eszopiclone, gabapentin, glimepiride, hydrocodone-acetaminophen, ketorolac, lantus solostar, lidocaine, lidocaine, linaclotide, lisinopril, magnesium oxide, metformin, metformin, ondansetron, orphenadrine, oxycodone-acetaminophen, primidone, simvastatin, tizanidine, tolterodine la, topiramate, and tramadol, and the following Facility-Administered Medications: mupirocin. and the patient is reported to be patient is compliant with medication protocol,  Medications are reported to be effective.  Regarding these diagnoses, referrals were made to the following provider(s):  Home Health.  All notes on chart for PCP to review.    The patient was monitored remotely for activity level; pain; medications.    The patient's physical needs include:  physical healthcare; needs assistance with ADLs; help taking medications as prescribed.     The patient's mental support needs include:  increased  support    The patient's cognitive support needs include:  medication; needs assistance with ADLs; caregiver; health care; personal care    The patient's psychosocial support needs include:  continued support    The patient's functional needs include: medication education; needs assistance for ADLs; physical healthcare    The patient's environmental needs include:  not applicable    Care Plan overall comments:  No data recorded    Refer to previous outreach notes for more information on the areas listed above.    Monthly Billing Diagnoses  (E11.9,  Z79.4) Type 2 diabetes mellitus without complication, with long-term current use of insulin (HCC)    (G89.29) Other chronic pain    (R53.1) Generalized weakness    Medications   · Medications have been reconciled    Care Plan progress this month:      Recently Modified Care Plans Updates made since 2/28/2023 12:00 AM     Frailty Syndrome (Adult)         Problem Priority Last Modified     Disease Progression (Frailty) --  3/28/2023 10:15 AM by Mimi Buitrago RN              Goal Recent Progress Last Modified     Disease Progression Prevented or Minimized --  3/28/2023 10:15 AM by Mimi Buitrago RN     Evidence-based guidance:   Anticipate referral to pharmacist for recognition of exposure to potentially inappropriate medication, such as aspirin, statin, antihypertensive, diuretic, bisphosphonate, analgesic, laxative and antidepressant, hypnotic.   Assess for presence of frailty indicators.   Develop individualized, interprofessional combination therapy plan that may slow progression of frailty.   Facilitate opportunity for shared decision-making regarding individualized medication de-prescribing plan; include plan for weaning when necessary.   Prevent infection by use of skin and hand hygiene, as well as oral and dental care; avoid aspiration and change position frequently; receive flu and pneumonia vaccines.   Review medication list for those that may  "contribute to the development of pneumonia, such as antipsychotic or sedative and those that cause dry mouth or achlorhydria.   Promote a regular daily exercise program based on tolerance, ability and patient choice that will support positive thinking about disease or aging process and reduce fear of falling.   Provide frequent encouragement and praise that supports positive view of aging; avoid use of term \"frailty\" that contributes to negative self-regard.    Notes:            Task Due Date Last Modified     Alleviate Barriers to Frailty Treatment --  3/28/2023 10:15 AM by Mimi Buitrago RN     Care Management Activities:      - not discussed during this outreach      Notes:              Problem Priority Last Modified     Malnutrition (Frailty) --  3/28/2023 10:15 AM by Mimi Buitrago RN              Goal Recent Progress Last Modified     Malnutrition Prevented --  3/28/2023 10:15 AM by Mimi Buitrago RN     Evidence-based guidance:   Anticipate supplementation with oral nutrition supplements if unable to meet energy and protein requirements through usual foods and beverages.   Anticipate supplementation with vitamin D2 or D3 and calcium when insufficiency is suspected or confirmed.   Complete a comprehensive nutrition assessment that includes the identification of macro and micro deficiencies, barriers to eating, cognition and mental status, as well as social determinants of health.   Mutually determine weight goal focusing on preservation or increase in lean body mass.   Provide guidance to caregiver regarding the need to offer feeding assistance in a way that is not demeaning and promotes as much independence as possible.   Provide ongoing encouragement and support as diet changes are made and supplements are added, including home visits and telephone calls.    Notes:            Task Due Date Last Modified     Optimize Nutrition Status --  3/28/2023 10:16 AM by Mimi Butirago, " RN     Care Management Activities:      - not discussed during this outreach      Notes:              Problem Priority Last Modified     Functional Decline (Frailty) --  3/28/2023 10:15 AM by Mimi Buitrago RN              Goal Recent Progress Last Modified     Functional Decline Minimized --  3/28/2023 10:15 AM by Mimi Buitrago RN     Evidence-based guidance:   Assess fall risk, including balance and gait impairment, muscle weakness, diminished vision or hearing, environmental hazards, effects of medication and dehydration.   Assess and review gait speed, decreased muscle strength or impaired functional mobility; consider use of validated tool if available.   Prepare patient for rehabilitation services to develop an individualized activity and exercise program as indicated, such as progressive resistance strengthening, balance and activity tolerance training or home exercise program.   Prevent falls with environmental adjustment; encourage compliance with exercise program, management of incontinence and adequate vitamin D and calcium intake from food or supplements.   Promote gradual increase in intensity of activity and exercise as tolerated, such as duration, frequency, exercise repetition and sets and intensity.   Provide guidance and interventions aimed at fall prevention.   Review or assess presence of reduced muscle mass or results of dual-energy x-ray absorptiometry.    Notes:            Task Due Date Last Modified     Maintain or Improve Strength and Functional Ability --  3/28/2023 10:16 AM by Mimi Buitrago RN     Care Management Activities:      - participation in rehabilitation therapy encouraged      Notes:                      · Current Specialty Plan of Care Status signed by both patient and provider    Instructions   · Patient was provided an electronic copy of care plan  · CCM services were explained and offered and patient has accepted these services.  · Patient has  given their written consent to receive CCM services and understands that this includes the authorization of electronic communication of medical information with the other treating providers.  · Patient understands that they may stop CCM services at any time and these changes will be effective at the end of the calendar month and will effectively revocate the agreement of CCM services.  · Patient understands that only one practitioner can furnish and be paid for CCM services during one calendar month.  Patient also understands that there may be co-payment or deductible fees in association with CCM services.  · Patient will continue with at least monthly follow-up calls with the Ambulatory .    Mimi IRENE  Ambulatory Case Management    3/31/2023, 13:00 EDT

## 2023-03-31 NOTE — TELEPHONE ENCOUNTER
Confirmed Patient was picked up by [facility] on 3/29/23.     Left Contact Information in case they need to communicate changes/needs.

## 2023-04-03 ENCOUNTER — PATIENT OUTREACH (OUTPATIENT)
Dept: CASE MANAGEMENT | Facility: OTHER | Age: 78
End: 2023-04-03
Payer: MEDICARE

## 2023-04-03 DIAGNOSIS — G89.29 OTHER CHRONIC PAIN: Primary | ICD-10-CM

## 2023-04-03 DIAGNOSIS — R53.1 GENERALIZED WEAKNESS: ICD-10-CM

## 2023-04-03 NOTE — OUTREACH NOTE
AMBULATORY CASE MANAGEMENT NOTE    Name and Relationship of Patient/Support Person: Ruth Sharma - Emergency Contact    CCM Interim Update    Spoke with daughter. Lionel  has established case with patient. Physical Therapy to work with patient 2x/Week (Wednesday's & Friday's).     Reminded daughter of PCP follow up on 4/24 - verbalized understanding.     Denies any needs at the moment. Encouraged to call with any future needs.       Plan: Follow Up After PCP Visit     Mimi IRENE  Ambulatory Case Management    4/3/2023, 09:06 EDT

## 2023-04-04 RX ORDER — MAGNESIUM OXIDE 400 MG/1
1 TABLET ORAL NIGHTLY
Qty: 90 TABLET | Refills: 0 | Status: SHIPPED | OUTPATIENT
Start: 2023-04-04

## 2023-04-12 ENCOUNTER — TELEPHONE (OUTPATIENT)
Dept: FAMILY MEDICINE CLINIC | Facility: CLINIC | Age: 78
End: 2023-04-12

## 2023-04-12 NOTE — TELEPHONE ENCOUNTER
Called patient at [phone] to notify and inform of active urine order however was unable to reach patient. LVM asking patient to please return call.

## 2023-04-12 NOTE — TELEPHONE ENCOUNTER
Caller: Maureen Hernandez    Relationship: Self    Best call back number: [phone]    What is the best time to reach you: ANYTIME     Who are you requesting to speak with (clinical staff, provider,  specific staff member): CLINICAL       What was the call regarding: PATIENT RECENTLY RECEIVED A LETTER STATING THAT SHE WAS NEEDED TO COMPLETE LABS, AND HAS RECENTLY HAD LABS, AND TESTING COMPLETED, PLEASE ADVISE.     Do you require a callback: YES

## 2023-04-24 ENCOUNTER — OFFICE VISIT (OUTPATIENT)
Dept: FAMILY MEDICINE CLINIC | Facility: CLINIC | Age: 78
End: 2023-04-24
Payer: MEDICARE

## 2023-04-24 VITALS
DIASTOLIC BLOOD PRESSURE: 58 MMHG | HEART RATE: 84 BPM | WEIGHT: 120.2 LBS | BODY MASS INDEX: 21.3 KG/M2 | HEIGHT: 63 IN | TEMPERATURE: 97.5 F | OXYGEN SATURATION: 98 % | SYSTOLIC BLOOD PRESSURE: 108 MMHG

## 2023-04-24 DIAGNOSIS — G47.00 INSOMNIA, UNSPECIFIED TYPE: ICD-10-CM

## 2023-04-24 DIAGNOSIS — M54.50 CHRONIC RIGHT-SIDED LOW BACK PAIN WITHOUT SCIATICA: ICD-10-CM

## 2023-04-24 DIAGNOSIS — G89.29 CHRONIC RIGHT-SIDED LOW BACK PAIN WITHOUT SCIATICA: ICD-10-CM

## 2023-04-24 DIAGNOSIS — Z00.00 ENCOUNTER FOR SUBSEQUENT ANNUAL WELLNESS VISIT (AWV) IN MEDICARE PATIENT: Primary | ICD-10-CM

## 2023-04-24 DIAGNOSIS — Z78.0 POST-MENOPAUSAL: ICD-10-CM

## 2023-04-24 DIAGNOSIS — R26.89 DECREASED MOBILITY: ICD-10-CM

## 2023-04-24 PROCEDURE — 1170F FXNL STATUS ASSESSED: CPT | Performed by: NURSE PRACTITIONER

## 2023-04-24 PROCEDURE — 1159F MED LIST DOCD IN RCRD: CPT | Performed by: NURSE PRACTITIONER

## 2023-04-24 PROCEDURE — 1160F RVW MEDS BY RX/DR IN RCRD: CPT | Performed by: NURSE PRACTITIONER

## 2023-04-24 RX ORDER — ATORVASTATIN CALCIUM 40 MG/1
40 TABLET, FILM COATED ORAL DAILY
Qty: 90 TABLET | Refills: 1 | Status: SHIPPED | OUTPATIENT
Start: 2023-04-24

## 2023-04-24 RX ORDER — LOPERAMIDE HYDROCHLORIDE 2 MG/1
2 CAPSULE ORAL 4 TIMES DAILY PRN
Qty: 90 CAPSULE | Refills: 3 | Status: SHIPPED | OUTPATIENT
Start: 2023-04-24

## 2023-04-24 RX ORDER — LIDOCAINE 50 MG/G
1 PATCH TOPICAL EVERY 12 HOURS
Qty: 30 PATCH | Refills: 2 | Status: SHIPPED | OUTPATIENT
Start: 2023-04-24

## 2023-04-24 RX ORDER — ONDANSETRON 4 MG/1
4 TABLET, ORALLY DISINTEGRATING ORAL EVERY 8 HOURS PRN
Qty: 30 TABLET | Refills: 3 | Status: CANCELLED | OUTPATIENT
Start: 2023-04-24

## 2023-04-24 RX ORDER — GABAPENTIN 300 MG/1
300-600 CAPSULE ORAL NIGHTLY
Qty: 60 CAPSULE | Refills: 1 | Status: SHIPPED | OUTPATIENT
Start: 2023-04-24

## 2023-04-24 RX ORDER — ASPIRIN 325 MG
325 TABLET, DELAYED RELEASE (ENTERIC COATED) ORAL 2 TIMES DAILY
Qty: 60 TABLET | Refills: 1 | Status: SHIPPED | OUTPATIENT
Start: 2023-04-24

## 2023-04-24 RX ORDER — INSULIN GLARGINE 100 [IU]/ML
15 INJECTION, SOLUTION SUBCUTANEOUS NIGHTLY
Qty: 15 ML | Refills: 1 | Status: SHIPPED | OUTPATIENT
Start: 2023-04-24

## 2023-04-24 RX ORDER — ONDANSETRON 4 MG/1
4 TABLET, FILM COATED ORAL EVERY 8 HOURS PRN
Qty: 90 TABLET | Refills: 0 | Status: SHIPPED | OUTPATIENT
Start: 2023-04-24

## 2023-04-24 RX ORDER — LISINOPRIL 5 MG/1
5 TABLET ORAL DAILY
Qty: 90 TABLET | Refills: 1 | Status: SHIPPED | OUTPATIENT
Start: 2023-04-24

## 2023-04-24 RX ORDER — DARIDOREXANT 25 MG/1
1 TABLET, FILM COATED ORAL NIGHTLY
Qty: 90 TABLET | Refills: 1 | Status: SHIPPED | OUTPATIENT
Start: 2023-04-24

## 2023-04-24 RX ORDER — PRIMIDONE 50 MG/1
50 TABLET ORAL 3 TIMES DAILY
Qty: 90 TABLET | Refills: 1 | Status: SHIPPED | OUTPATIENT
Start: 2023-04-24

## 2023-04-24 RX ORDER — MAGNESIUM OXIDE 400 MG/1
1 TABLET ORAL NIGHTLY
Qty: 90 TABLET | Refills: 0 | Status: SHIPPED | OUTPATIENT
Start: 2023-04-24

## 2023-04-24 NOTE — PROGRESS NOTES
The ABCs of the Annual Wellness Visit  Subsequent Medicare Wellness Visit    Subjective    Maureen Hernandez is a 78 y.o. female who presents for a Subsequent Medicare Wellness Visit.    The following portions of the patient's history were reviewed and   updated as appropriate: allergies, current medications, past family history, past medical history, past social history, past surgical history and problem list.    Compared to one year ago, the patient feels her physical   health is worse.    Compared to one year ago, the patient feels her mental   health is the same.    Recent Hospitalizations:  She was not admitted to the hospital during the last year.       Current Medical Providers:  Patient Care Team:  Elodia Latham APRN as PCP - General (Nurse Practitioner)  Ángel Ruelas MD as Surgeon (Orthopedic Surgery)  Mimi Buitrago RN as Ambulatory  ([facility])  Sury Fagan MSW as  ([facility]) ([facility])    Outpatient Medications Prior to Visit   Medication Sig Dispense Refill   • HYDROcodone-acetaminophen (NORCO) 5-325 MG per tablet Take 1 tablet by mouth 3 (Three) Times a Day As Needed.     • aspirin 325 MG EC tablet Take 1 tablet by mouth 2 (Two) Times a Day. 60 tablet 1   • atorvastatin (LIPITOR) 40 MG tablet Take 1 tablet by mouth Daily.     • gabapentin (NEURONTIN) 300 MG capsule Take 1-2 capsules by mouth Every Night. 60 capsule 1   • LANTUS SOLOSTAR 100 UNIT/ML injection pen INJECT 15 UNITS SUBCUTANEOUSLY EVERY DAY AT BEDTIME FOR 30 DAYS     • lidocaine (LIDODERM) 5 % Place 1 patch on the skin as directed by provider Every 12 (Twelve) Hours.     • linaclotide (Linzess) 145 MCG capsule capsule Take 1 capsule by mouth Every Morning Before Breakfast. 30 capsule 1   • lisinopril (PRINIVIL,ZESTRIL) 5 MG tablet lisinopril 5 mg tablet   TAKE 1 TABLET BY MOUTH ONCE DAILY FOR 90 DAYS     • magnesium oxide (MAG-OX) 400 MG tablet Take 1 tablet by mouth Every  Night. 90 tablet 0   • metFORMIN (GLUCOPHAGE) 1000 MG tablet Take 1 tablet by mouth 2 (Two) Times a Day With Meals. TAKES 2 TABS TWICE PER DAY     • ondansetron (ZOFRAN) 4 MG tablet TAKE 1 TABLET BY MOUTH EVERY 8 HOURS AS NEEDED FOR 30 DAYS     • primidone (MYSOLINE) 50 MG tablet Take 1 tablet by mouth 3 (Three) Times a Day.     • cephalexin (KEFLEX) 500 MG capsule Take 1 capsule by mouth 4 (Four) Times a Day. (Patient not taking: Reported on 3/27/2023) 28 capsule 0   • docusate sodium (COLACE) 100 MG capsule TAKE 1 CAPSULE BY MOUTH TWICE DAILY FOR 30 DAYS     • eszopiclone (Lunesta) 1 MG tablet Take 1 tablet by mouth Every Night. Take immediately before bedtime (Patient not taking: Reported on 3/27/2023) 10 tablet 0   • glimepiride (AMARYL) 4 MG tablet Take 4 mg by mouth Daily. (Patient not taking: Reported on 3/27/2023)     • ketorolac (TORADOL) 10 MG tablet Take 1 tablet by mouth Every 6 (Six) Hours As Needed for Moderate Pain. (Patient not taking: Reported on 3/27/2023) 15 tablet 0   • lidocaine (LIDODERM) 5 % Place 1 patch on the skin as directed by provider Daily. Remove & Discard patch within 12 hours or as directed by MD 30 patch 2   • metFORMIN (GLUCOPHAGE) 500 MG tablet metformin 500 mg tablet   TAKE 2 TABLETS BY MOUTH TWICE DAILY (Patient not taking: Reported on 3/27/2023)     • orphenadrine (NORFLEX) 100 MG 12 hr tablet Take 1 tablet by mouth 2 (Two) Times a Day. 30 tablet 0   • oxyCODONE-acetaminophen (PERCOCET) 5-325 MG per tablet Take 1 tablet by mouth Every 6 (Six) Hours As Needed.     • simvastatin (ZOCOR) 20 MG tablet Take 1 tablet by mouth Every Night.     • tiZANidine (Zanaflex) 4 MG tablet Take 1 tablet by mouth At Night As Needed for Muscle Spasms. 30 tablet 1   • tolterodine LA (DETROL LA) 2 MG 24 hr capsule Take 1 capsule by mouth Daily.     • topiramate (TOPAMAX) 50 MG tablet Take 50 mg by mouth 2 (Two) Times a Day. (Patient not taking: Reported on 3/27/2023)     • traMADol (ULTRAM) 50 MG  "tablet Take 50 mg by mouth 4 (Four) Times a Day. (Patient not taking: Reported on 3/27/2023)       Facility-Administered Medications Prior to Visit   Medication Dose Route Frequency Provider Last Rate Last Admin   • mupirocin (BACTROBAN) 2 % nasal ointment   Nasal BID Estevan Sharma MD           Opioid medication/s are on active medication list.  and I have evaluated her active treatment plan and pain score trends (see table).  There were no vitals filed for this visit.  I have reviewed the chart for potential of high risk medication and harmful drug interactions in the elderly.            Aspirin is on active medication list. Aspirin use is indicated based on review of current medical condition/s. Pros and cons of this therapy have been discussed today. Benefits of this medication outweigh potential harm.  Patient has been encouraged to continue taking this medication.  .      Patient Active Problem List   Diagnosis   • Pain of right sacroiliac joint   • Degenerative disc disease, lumbar   • Primary osteoarthritis of right hip   • Hip pain, right     Advance Care Planning   Advance Care Planning     Advance Directive is on file.  ACP discussion was held with the patient during this visit. Patient has an advance directive in EMR which is still valid.      Objective    Vitals:    04/24/23 0927   BP: 108/58   Pulse: 84   Temp: 97.5 °F (36.4 °C)   SpO2: 98%   Weight: 54.5 kg (120 lb 3.2 oz)   Height: 160 cm (63\")     Estimated body mass index is 21.29 kg/m² as calculated from the following:    Height as of this encounter: 160 cm (63\").    Weight as of this encounter: 54.5 kg (120 lb 3.2 oz).    BMI is within normal parameters. No other follow-up for BMI required.      Does the patient have evidence of cognitive impairment? No    Lab Results   Component Value Date    TRIG 146 03/27/2023    HDL 39 (L) 03/27/2023    LDL 48 03/27/2023    VLDL 25 03/27/2023    HGBA1C 6.30 (H) 03/27/2023        HEALTH RISK " ASSESSMENT    Smoking Status:  Social History     Tobacco Use   Smoking Status Never   Smokeless Tobacco Never     Alcohol Consumption:  Social History     Substance and Sexual Activity   Alcohol Use No     Fall Risk Screen:    LUCINDA Fall Risk Assessment has not been completed.    Depression Screenin/24/2023     9:20 AM   PHQ-2/PHQ-9 Depression Screening   Little Interest or Pleasure in Doing Things 0-->not at all   Feeling Down, Depressed or Hopeless 0-->not at all   PHQ-9: Brief Depression Severity Measure Score 0       Health Habits and Functional and Cognitive Screenin/24/2023     9:00 AM   Functional & Cognitive Status   Do you have difficulty preparing food and eating? No   Do you have difficulty bathing yourself, getting dressed or grooming yourself? No   Do you have difficulty using the toilet? No   Do you have difficulty moving around from place to place? Yes   Do you have trouble with steps or getting out of a bed or a chair? Yes   Current Diet Other   Dental Exam Not up to date   Eye Exam Not up to date   Exercise (times per week) 7 times per week   Current Exercises Include Other        Exercise Comment physical therapy excersizes   Do you need help using the phone?  No   Are you deaf or do you have serious difficulty hearing?  Yes   Do you need help with transportation? Yes   Do you need help shopping? Yes   Do you need help preparing meals?  No   Do you need help with housework?  Yes   Do you need help with laundry? No   Do you need help taking your medications? No   Do you need help managing money? No   Do you ever drive or ride in a car without wearing a seat belt? No   Have you felt unusual stress, anger or loneliness in the last month? No   Who do you live with? Alone   If you need help, do you have trouble finding someone available to you? No   Have you been bothered in the last four weeks by sexual problems? No   Do you have difficulty concentrating, remembering or making  decisions? No       Age-appropriate Screening Schedule:  Refer to the list below for future screening recommendations based on patient's age, sex and/or medical conditions. Orders for these recommended tests are listed in the plan section. The patient has been provided with a written plan.    Health Maintenance   Topic Date Due   • DXA SCAN  08/17/2020   • DIABETIC EYE EXAM  06/23/2023 (Originally 2/11/2023)   • Pneumococcal Vaccine 65+ (2 - PCV) 06/28/2023 (Originally 9/26/2018)   • COVID-19 Vaccine (4 - Booster) 12/31/2023 (Originally 3/2/2022)   • TDAP/TD VACCINES (1 - Tdap) 12/31/2023 (Originally 2/7/1966)   • ZOSTER VACCINE (1 of 2) 12/31/2023 (Originally 2/7/1997)   • INFLUENZA VACCINE  08/01/2023   • HEMOGLOBIN A1C  09/27/2023   • MAMMOGRAM  11/03/2023   • LIPID PANEL  03/27/2024   • URINE MICROALBUMIN  03/27/2024   • ANNUAL WELLNESS VISIT  04/24/2024   • COLORECTAL CANCER SCREENING  06/04/2025   • HEPATITIS C SCREENING  Completed                  CMS Preventative Services Quick Reference  Risk Factors Identified During Encounter  Fall Risk-High or Moderate: Discussed Fall Prevention in the home and Information on Fall Prevention Shared in After Visit Summary  The above risks/problems have been discussed with the patient.  Pertinent information has been shared with the patient in the After Visit Summary.  An After Visit Summary and PPPS were made available to the patient.    Follow Up:   Next Medicare Wellness visit to be scheduled in 1 year.       Additional E&M Note during same encounter follows:  Patient has multiple medical problems which are significant and separately identifiable that require additional work above and beyond the Medicare Wellness Visit.      Chief Complaint  Diabetes and Medicare Wellness-subsequent    Subjective        Back Pain  This is a chronic problem. The current episode started more than 1 month ago. The problem occurs constantly. The problem is unchanged. The pain is present in  "the lumbar spine. The quality of the pain is described as shooting and stabbing. The pain does not radiate. The pain is at a severity of 6/10. The pain is the same all the time. The symptoms are aggravated by position, sitting, standing and twisting. Stiffness is present all day. Associated symptoms include paresthesias and weight loss. Pertinent negatives include no abdominal pain, bladder incontinence, bowel incontinence, chest pain, dysuria, fever, headaches, leg pain, numbness, paresis, pelvic pain, perianal numbness, tingling or weakness. Risk factors include history of osteoporosis, lack of exercise, menopause, poor posture, recent trauma and sedentary lifestyle.   Insomnia  Pertinent negatives include no abdominal pain, chest pain, fever, headaches, numbness or weakness. Nothing aggravates the symptoms. She has tried nothing for the symptoms. The treatment provided no relief.     Maureen Hernandez is also being seen today for     Review of Systems   Constitutional: Positive for unexpected weight loss. Negative for fever.   Cardiovascular: Negative for chest pain.   Gastrointestinal: Negative for abdominal pain and bowel incontinence.   Genitourinary: Negative for dysuria, pelvic pain and urinary incontinence.   Musculoskeletal: Positive for back pain.   Neurological: Positive for paresthesias. Negative for tingling, weakness and numbness.   Psychiatric/Behavioral: The patient has insomnia.        Objective   Vital Signs:  /58   Pulse 84   Temp 97.5 °F (36.4 °C)   Ht 160 cm (63\")   Wt 54.5 kg (120 lb 3.2 oz)   SpO2 98%   BMI 21.29 kg/m²     Physical Exam  Vitals reviewed.   Constitutional:       General: She is not in acute distress.     Appearance: Normal appearance. She is well-developed. She is not ill-appearing.   HENT:      Head: Normocephalic and atraumatic.      Right Ear: External ear normal.      Left Ear: External ear normal.   Eyes:      Conjunctiva/sclera: Conjunctivae normal.      " Pupils: Pupils are equal, round, and reactive to light.   Cardiovascular:      Rate and Rhythm: Normal rate and regular rhythm.      Heart sounds: No murmur heard.    No friction rub. No gallop.   Pulmonary:      Effort: Pulmonary effort is normal.      Breath sounds: Normal breath sounds. No wheezing or rhonchi.   Skin:     General: Skin is warm and dry.   Neurological:      Mental Status: She is alert and oriented to person, place, and time.      Gait: Gait abnormal (slow gait with minor assitance of daughter to help stand).   Psychiatric:         Mood and Affect: Mood and affect normal.         Behavior: Behavior normal.         Thought Content: Thought content normal.         Judgment: Judgment normal.          The following data was reviewed by: OLIVIA Ross on 04/24/2023:  Common labs        3/27/2023    10:31   Common Labs   Glucose 261     BUN 18     Creatinine 0.69     Sodium 130     Potassium 4.1     Chloride 90     Calcium 9.9     Albumin 4.2     Total Bilirubin 0.3     Alkaline Phosphatase 68     AST (SGOT) 41     ALT (SGPT) 57     WBC 7.23     Hemoglobin 14.6     Hematocrit 42.0     Platelets 315     Total Cholesterol 112     Triglycerides 146     HDL Cholesterol 39     LDL Cholesterol  48     Hemoglobin A1C 6.30     Microalbumin, Urine 1.4       Data reviewed: Most recent office visit           Assessment and Plan   Diagnoses and all orders for this visit:    1. Encounter for subsequent annual wellness visit (AWV) in Medicare patient (Primary)    2. Chronic right-sided low back pain without sciatica  Comments:  MRI to be done  May 1, 2023, will likely refer over to pain management for possible epidural steroid injection, patient is improving with physical therapy  Orders:  -     gabapentin (NEURONTIN) 300 MG capsule; Take 1-2 capsules by mouth Every Night.  Dispense: 60 capsule; Refill: 1    3. Decreased mobility due to pain in back  Comments:  Patient is improving with physical therapy,  lidocaine patches and pain medication.  She looks much better today than she did 1 month ago.  We will continue to m  Orders:  -     gabapentin (NEURONTIN) 300 MG capsule; Take 1-2 capsules by mouth Every Night.  Dispense: 60 capsule; Refill: 1    4. Post-menopausal  Comments:  Patient agreeable with DEXA scan  Orders:  -     DEXA Bone Density Axial    5. Insomnia, unspecified type  Comments:  We will start on Quiviq for sleep follow-up in June patient is agreeable  Orders:  -     Daridorexant HCl (Quviviq) 25 MG tablet; Take 1 tablet by mouth Every Night.  Dispense: 90 tablet; Refill: 1    Other orders  -     lisinopril (PRINIVIL,ZESTRIL) 5 MG tablet; Take 1 tablet by mouth Daily.  Dispense: 90 tablet; Refill: 1  -     magnesium oxide (MAG-OX) 400 MG tablet; Take 1 tablet by mouth Every Night.  Dispense: 90 tablet; Refill: 0  -     metFORMIN (GLUCOPHAGE) 1000 MG tablet; Take 1 tablet by mouth 2 (Two) Times a Day With Meals. TAKES 2 TABS TWICE PER DAY  Dispense: 90 tablet; Refill: 1  -     primidone (MYSOLINE) 50 MG tablet; Take 1 tablet by mouth 3 (Three) Times a Day.  Dispense: 90 tablet; Refill: 1  -     ondansetron (ZOFRAN) 4 MG tablet; Take 1 tablet by mouth Every 8 (Eight) Hours As Needed for Nausea or Vomiting.  Dispense: 90 tablet; Refill: 0  -     Lantus SoloStar 100 UNIT/ML injection pen; Inject 15 Units under the skin into the appropriate area as directed Every Night.  Dispense: 15 mL; Refill: 1  -     lidocaine (LIDODERM) 5 %; Place 1 patch on the skin as directed by provider Every 12 (Twelve) Hours.  Dispense: 30 patch; Refill: 2  -     aspirin EC (aspirin) 325 MG tablet; Take 1 tablet by mouth 2 (Two) Times a Day.  Dispense: 60 tablet; Refill: 1  -     atorvastatin (LIPITOR) 40 MG tablet; Take 1 tablet by mouth Daily.  Dispense: 90 tablet; Refill: 1  -     linaclotide (Linzess) 145 MCG capsule capsule; Take 1 capsule by mouth Every Morning Before Breakfast.  Dispense: 30 capsule; Refill: 1  -      loperamide (IMODIUM) 2 MG capsule; Take 1 capsule by mouth 4 (Four) Times a Day As Needed for Diarrhea.  Dispense: 90 capsule; Refill: 3             Follow Up   Return in about 6 weeks (around 6/5/2023) for Recheck.  Patient was given instructions and counseling regarding her condition or for health maintenance advice. Please see specific information pulled into the AVS if appropriate.

## 2023-04-25 ENCOUNTER — TELEPHONE (OUTPATIENT)
Dept: CASE MANAGEMENT | Facility: OTHER | Age: 78
End: 2023-04-25
Payer: MEDICARE

## 2023-04-27 ENCOUNTER — TELEPHONE (OUTPATIENT)
Dept: FAMILY MEDICINE CLINIC | Facility: CLINIC | Age: 78
End: 2023-04-27
Payer: MEDICARE

## 2023-04-27 ENCOUNTER — TELEPHONE (OUTPATIENT)
Dept: CASE MANAGEMENT | Facility: OTHER | Age: 78
End: 2023-04-27
Payer: MEDICARE

## 2023-04-27 NOTE — TELEPHONE ENCOUNTER
Lionel  Physical Therapist called CM directly to report that patient has been taking 9,000 mg of Tylenol per day due to running out of [place].     PT reeducated patient on the appropriate amount of Tylenol a person can safely take in a day.     Patient is requesting a refill on Norco that was originally prescribed by Dr. Richelle Quinonez per prescription bottle.     Patient was previous patient of Dr. Quinonez and [place] [facility] [place].

## 2023-04-27 NOTE — TELEPHONE ENCOUNTER
RAYMOND WITH [facility] STATED THAT PATIENT HAS BEEN WITHOUT HER PAIN MEDICATION FOR OVER A WEEK AND HAS BEEN SUBSTITUTING IT WITH 9000MG OF TYLENOL DAILY. RAYMOND ADVISED PATIENT TO NOT EXCEED 4000MG DAILY.   PATIENT NEEDS REFILL OF HER HYDROCODONE.

## 2023-04-27 NOTE — TELEPHONE ENCOUNTER
Phone call placed to the patient with review of Tylenol she has been taking of 9000 mg daily total. Patient stated that the therapist told her not to take that much. She stated she only took that for 4 days and stopped. Consulted with Dr Bell with plan to get advise patient and get into her provider sooner. Appointment made for 5/4/2023 at 8:30am with patient agreement and voiced understanding of medication regime.

## 2023-04-28 ENCOUNTER — PATIENT OUTREACH (OUTPATIENT)
Dept: CASE MANAGEMENT | Facility: OTHER | Age: 78
End: 2023-04-28
Payer: MEDICARE

## 2023-04-28 DIAGNOSIS — Z79.4 TYPE 2 DIABETES MELLITUS WITHOUT COMPLICATION, WITH LONG-TERM CURRENT USE OF INSULIN: ICD-10-CM

## 2023-04-28 DIAGNOSIS — E11.9 TYPE 2 DIABETES MELLITUS WITHOUT COMPLICATION, WITH LONG-TERM CURRENT USE OF INSULIN: ICD-10-CM

## 2023-04-28 DIAGNOSIS — R53.1 GENERALIZED WEAKNESS: ICD-10-CM

## 2023-04-28 DIAGNOSIS — G89.29 OTHER CHRONIC PAIN: Primary | ICD-10-CM

## 2023-04-28 NOTE — OUTREACH NOTE
[facility] End of Month Documentation    This Chronic Medical Management Care Plan for Maureen Hernandez, 78 y.o. female, has been monitored and managed; reviewed and a new plan of care implemented for the month of April.  A cumulative time of 24 minutes was spent on this patient record this month, including chart review; phone call with relative; electronic communication with primary care provider; phone call with other providers.    Regarding the patient's problems: has Pain of right sacroiliac joint; Degenerative disc disease, lumbar; Primary osteoarthritis of right hip; and Hip pain, right on their problem list., the following items were addressed: medical records; medications and any changes can be found within the plan section of the note.  A detailed listing of time spent for chronic care management is tracked within each outreach encounter.  Current medications include:  has a current medication list which includes the following prescription(s): aspirin ec, atorvastatin, quviviq, gabapentin, hydrocodone-acetaminophen, lantus solostar, lidocaine, linaclotide, lisinopril, loperamide, magnesium oxide, metformin, ondansetron, and primidone, and the following Facility-Administered Medications: mupirocin. and the patient is reported to be patient is noncompliant with medication protocol,  Medications are reported to be non-effective in controlling symptoms and changes have been made to the medication protocol.  Regarding these diagnoses, referrals were made to the following provider(s): N/A.  All notes on chart for PCP to review.    The patient was monitored remotely for activity level; pain; medications.    The patient's physical needs include:  physical healthcare; needs assistance with ADLs; help taking medications as prescribed, Physical Therapy in Home.     The patient's mental support needs include:  continued support    The patient's cognitive support needs include:  continued support; medication    The patient's  psychosocial support needs include:  continued support    The patient's functional needs include: physical healthcare; medication education    The patient's environmental needs include:  not applicable    Care Plan overall comments:  No data recorded    Refer to previous outreach notes for more information on the areas listed above.    Monthly Billing Diagnoses  (G89.29) Other chronic pain    (E11.9,  Z79.4) Type 2 diabetes mellitus without complication, with long-term current use of insulin    (R53.1) Generalized weakness    Medications   · Medications have been reconciled    Care Plan progress this month:      Recently Modified Care Plans Updates made since 3/28/2023 12:00 AM     Frailty Syndrome (Adult)         Problem Priority Last Modified     Disease Progression (Frailty) --  3/28/2023 10:15 AM by Mimi Buitrago RN              Goal Recent Progress Last Modified     Disease Progression Prevented or Minimized --  3/28/2023 10:15 AM by Mimi Buitrago RN     Evidence-based guidance:   Anticipate referral to pharmacist for recognition of exposure to potentially inappropriate medication, such as aspirin, statin, antihypertensive, diuretic, bisphosphonate, analgesic, laxative and antidepressant, hypnotic.   Assess for presence of frailty indicators.   Develop individualized, interprofessional combination therapy plan that may slow progression of frailty.   Facilitate opportunity for shared decision-making regarding individualized medication de-prescribing plan; include plan for weaning when necessary.   Prevent infection by use of skin and hand hygiene, as well as oral and dental care; avoid aspiration and change position frequently; receive flu and pneumonia vaccines.   Review medication list for those that may contribute to the development of pneumonia, such as antipsychotic or sedative and those that cause dry mouth or achlorhydria.   Promote a regular daily exercise program based on tolerance,  "ability and patient choice that will support positive thinking about disease or aging process and reduce fear of falling.   Provide frequent encouragement and praise that supports positive view of aging; avoid use of term \"frailty\" that contributes to negative self-regard.    Notes:            Task Due Date Last Modified     Alleviate Barriers to Frailty Treatment --  3/28/2023 10:15 AM by Mimi Buitrago, RN     Care Management Activities:      - not discussed during this outreach      Notes:              Problem Priority Last Modified     Malnutrition (Frailty) --  3/28/2023 10:15 AM by Mimi Buitrago, PURVI              Goal Recent Progress Last Modified     Malnutrition Prevented --  3/28/2023 10:15 AM by Mimi Buitrago, RN     Evidence-based guidance:   Anticipate supplementation with oral nutrition supplements if unable to meet energy and protein requirements through usual foods and beverages.   Anticipate supplementation with vitamin D2 or D3 and calcium when insufficiency is suspected or confirmed.   Complete a comprehensive nutrition assessment that includes the identification of macro and micro deficiencies, barriers to eating, cognition and mental status, as well as social determinants of health.   Mutually determine weight goal focusing on preservation or increase in lean body mass.   Provide guidance to caregiver regarding the need to offer feeding assistance in a way that is not demeaning and promotes as much independence as possible.   Provide ongoing encouragement and support as diet changes are made and supplements are added, including home visits and telephone calls.    Notes:            Task Due Date Last Modified     Optimize Nutrition Status --  3/28/2023 10:16 AM by Mimi Buitrago, RN     Care Management Activities:      - not discussed during this outreach      Notes:              Problem Priority Last Modified     Functional Decline (Frailty) --  3/28/2023 10:15 AM " by Mimi Buitrago RN              Goal Recent Progress Last Modified     Functional Decline Minimized --  3/28/2023 10:15 AM by Mimi Buitrago RN     Evidence-based guidance:   Assess fall risk, including balance and gait impairment, muscle weakness, diminished vision or hearing, environmental hazards, effects of medication and dehydration.   Assess and review gait speed, decreased muscle strength or impaired functional mobility; consider use of validated tool if available.   Prepare patient for rehabilitation services to develop an individualized activity and exercise program as indicated, such as progressive resistance strengthening, balance and activity tolerance training or home exercise program.   Prevent falls with environmental adjustment; encourage compliance with exercise program, management of incontinence and adequate vitamin D and calcium intake from food or supplements.   Promote gradual increase in intensity of activity and exercise as tolerated, such as duration, frequency, exercise repetition and sets and intensity.   Provide guidance and interventions aimed at fall prevention.   Review or assess presence of reduced muscle mass or results of dual-energy x-ray absorptiometry.    Notes:            Task Due Date Last Modified     Maintain or Improve Strength and Functional Ability --  3/28/2023 10:16 AM by Mimi Buitrago RN     Care Management Activities:      - participation in rehabilitation therapy encouraged      Notes:                        Instructions   · Patient was provided an electronic copy of care plan  · CCM services were explained and offered and patient has accepted these services.  · Patient has given their written consent to receive CCM services and understands that this includes the authorization of electronic communication of medical information with the other treating providers.  · Patient understands that they may stop CCM services at any time and these  changes will be effective at the end of the calendar month and will effectively revocate the agreement of CCM services.  · Patient understands that only one practitioner can furnish and be paid for CCM services during one calendar month.  Patient also understands that there may be co-payment or deductible fees in association with CCM services.  · Patient will continue with at least monthly follow-up calls with the Ambulatory .    Mimi IRENE  Ambulatory Case Management    4/28/2023, 10:55 EDT

## 2023-05-01 ENCOUNTER — HOSPITAL ENCOUNTER (OUTPATIENT)
Dept: MRI IMAGING | Facility: HOSPITAL | Age: 78
Discharge: HOME | End: 2023-05-01
Admitting: NURSE PRACTITIONER
Payer: MEDICARE

## 2023-05-01 DIAGNOSIS — R26.89 DECREASED MOBILITY: ICD-10-CM

## 2023-05-01 DIAGNOSIS — M54.50 CHRONIC RIGHT-SIDED LOW BACK PAIN WITHOUT SCIATICA: ICD-10-CM

## 2023-05-01 DIAGNOSIS — M51.36 DDD (DEGENERATIVE DISC DISEASE), LUMBAR: Primary | ICD-10-CM

## 2023-05-01 DIAGNOSIS — M51.35 DDD (DEGENERATIVE DISC DISEASE), THORACOLUMBAR: ICD-10-CM

## 2023-05-01 DIAGNOSIS — G89.29 CHRONIC RIGHT-SIDED LOW BACK PAIN WITHOUT SCIATICA: ICD-10-CM

## 2023-05-01 DIAGNOSIS — M54.50 CHRONIC MIDLINE LOW BACK PAIN WITHOUT SCIATICA: ICD-10-CM

## 2023-05-01 DIAGNOSIS — G89.29 CHRONIC MIDLINE LOW BACK PAIN WITHOUT SCIATICA: ICD-10-CM

## 2023-05-01 PROCEDURE — 72148 MRI LUMBAR SPINE W/O DYE: CPT

## 2023-05-04 ENCOUNTER — OFFICE VISIT (OUTPATIENT)
Dept: FAMILY MEDICINE CLINIC | Facility: CLINIC | Age: 78
End: 2023-05-04
Payer: MEDICARE

## 2023-05-04 ENCOUNTER — PATIENT OUTREACH (OUTPATIENT)
Dept: CASE MANAGEMENT | Facility: OTHER | Age: 78
End: 2023-05-04
Payer: MEDICARE

## 2023-05-04 VITALS
DIASTOLIC BLOOD PRESSURE: 56 MMHG | WEIGHT: 120 LBS | SYSTOLIC BLOOD PRESSURE: 86 MMHG | OXYGEN SATURATION: 100 % | BODY MASS INDEX: 21.26 KG/M2 | HEIGHT: 63 IN | TEMPERATURE: 97.9 F | HEART RATE: 101 BPM

## 2023-05-04 DIAGNOSIS — Z79.4 TYPE 2 DIABETES MELLITUS WITHOUT COMPLICATION, WITH LONG-TERM CURRENT USE OF INSULIN: ICD-10-CM

## 2023-05-04 DIAGNOSIS — G89.29 CHRONIC MIDLINE LOW BACK PAIN WITHOUT SCIATICA: ICD-10-CM

## 2023-05-04 DIAGNOSIS — Z09 FOLLOW-UP EXAM: Primary | ICD-10-CM

## 2023-05-04 DIAGNOSIS — E11.9 TYPE 2 DIABETES MELLITUS WITHOUT COMPLICATION, WITH LONG-TERM CURRENT USE OF INSULIN: ICD-10-CM

## 2023-05-04 DIAGNOSIS — G89.29 OTHER CHRONIC PAIN: Primary | ICD-10-CM

## 2023-05-04 DIAGNOSIS — M54.50 CHRONIC MIDLINE LOW BACK PAIN WITHOUT SCIATICA: ICD-10-CM

## 2023-05-04 RX ORDER — TRAMADOL HYDROCHLORIDE 50 MG/1
50 TABLET ORAL EVERY 6 HOURS PRN
Qty: 60 TABLET | Refills: 1 | Status: SHIPPED | OUTPATIENT
Start: 2023-05-04

## 2023-05-04 NOTE — OUTREACH NOTE
AMBULATORY CASE MANAGEMENT NOTE    Name and Relationship of Patient/Support Person: Maureen Hernandez C - Self    CCM Interim Update    Met with patient in person during PCP follow up today.     Patient reports she continues to struggle with lower back pain and wants to start taking Tramadol since she has run out of [place] from an old prescription and aspirin has been upsetting her stomach. After review of PCP visit, patient has new script for Tramadol until Pain Management visit on 5/15/23.     Patient states she is still working with Physical Therapy through Home Health Services and now wishes to go to Outpatient Physical Therapy since she has gained enough strength to get out of the house. Patient verbalizes that she wants to get better so her daughter doesn't have to worry about taking care of her.     Plan to follow up after Pain Management appointment.       Adult Patient Profile  Questions/Answers    Flowsheet Row Most Recent Value   Symptoms/Conditions Managed at Home chronic pain   Barriers to Managing Health understanding health advice   Chronic Pain Location Lower Back   Chronic Pain Frequency frequent   Chronic Pain Quality aching   Chronic Pain Associated Signs/Symptoms weakness   Chronic Pain Aggravating Factors activity   Chronic Pain Management Strategies routine screening, positioning, medication therapy, adequate rest   Chronic Pain Self-Management Outcome 2 (bad)               Education Documentation  Benefits, taught by Mimi Buitrago, RN at 5/4/2023 10:09 AM.  Learner: Patient  Readiness: Acceptance  Method: Explanation  Response: Verbalizes Understanding    activity, taught by Mimi Buitrago, RN at 5/4/2023 10:09 AM.  Learner: Patient  Readiness: Acceptance  Method: Explanation  Response: Verbalizes Understanding          Mimi IRENE  Ambulatory Case Management    5/4/2023, 10:09 EDT

## 2023-05-04 NOTE — PROGRESS NOTES
Chief Complaint  MRI result (Completed 05/01/2023, please review) and Med Management    Subjective        Medical History: has a past medical history of Arthritis, DDD (degenerative disc disease), lumbar, Diabetes, GERD (gastroesophageal reflux disease), Hyperlipidemia, Palsy, Poor circulation, Sleep apnea, and Stress incontinence.     Surgical History: has a past surgical history that includes Lumbar fusion (2013); Cholecystectomy (1998); Hysterectomy; Knee surgery; Knee surgery; Total hip arthroplasty (Right, 05/06/2019); and Cataract extraction (Bilateral).     Family History: family history includes Cancer in her brother; Diabetes in her father and sister; Heart disease in her father and mother.     Social History: reports that she has never smoked. She has never used smokeless tobacco. She reports that she does not drink alcohol and does not use drugs.    Maureen Hernandez presents to [facility]  History of Present Illness  Patient does have a current history of type 2 diabetes.  She had it back taking her diabetic medications.  Patient had stopped them for short period of time due to not eating and rapid weight loss..  Patient is unsure what her last hemoglobin A1c was at her previous provider's office.  She previously has been on metformin and insulin injections nightly, currently taking her insulin and metformin.  , she does have a current history of hypertension and hyperlipidemia.  Patient's most recent a1c with medication is 6.3.    MRI back   Patient wanted to go over the results of her MRI of her L-spine.  I discussed with the patient the MRI shows degenerative disc disease throughout the entire spine from thoracic to lumbar.  Does show some mild disc bulge and mild displacement that is consistent due to degenerative disc disease.  There was no acute findings seen, she has an appointment 5/15/2023 with pain management to discuss possible epidural steroid injections and  "further treatment for the pain.  Patient would like to stop home physical therapy and go to physical therapy outside the home.  [facility]    Objective   Vital Signs:   BP (!) 86/56 (BP Location: Left arm, Patient Position: Sitting, Cuff Size: Adult)   Pulse 101   Temp 97.9 °F (36.6 °C) (Temporal)   Ht 160 cm (63\")   Wt 54.4 kg (120 lb)   SpO2 100%   BMI 21.26 kg/m²       Wt Readings from Last 3 Encounters:   05/04/23 54.4 kg (120 lb)   04/24/23 54.5 kg (120 lb 3.2 oz)   03/27/23 53.5 kg (118 lb)        BP Readings from Last 3 Encounters:   05/04/23 (!) 86/56   04/24/23 108/58   03/27/23 124/80        BMI is within normal parameters. No other follow-up for BMI required.       Physical Exam  Vitals reviewed.   Constitutional:       Appearance: Normal appearance. She is well-developed.   HENT:      Head: Normocephalic and atraumatic.   Eyes:      Conjunctiva/sclera: Conjunctivae normal.      Pupils: Pupils are equal, round, and reactive to light.   Cardiovascular:      Rate and Rhythm: Normal rate and regular rhythm.      Heart sounds: No murmur heard.  Pulmonary:      Effort: Pulmonary effort is normal.      Breath sounds: Normal breath sounds. No wheezing or rhonchi.   Skin:     General: Skin is warm and dry.   Neurological:      Mental Status: She is alert and oriented to person, place, and time.      Gait: Gait abnormal (slow).   Psychiatric:         Mood and Affect: Mood and affect normal.         Behavior: Behavior normal.         Thought Content: Thought content normal.         Judgment: Judgment normal.        Result Review :  {The following data was reviewed by OLIVIA Ross on 05/04/2023.  Common labs        3/27/2023    10:31   Common Labs   Glucose 261     BUN 18     Creatinine 0.69     Sodium 130     Potassium 4.1     Chloride 90     Calcium 9.9     Albumin 4.2     Total Bilirubin 0.3     Alkaline Phosphatase 68     AST (SGOT) 41     ALT (SGPT) 57  "    WBC 7.23     Hemoglobin 14.6     Hematocrit 42.0     Platelets 315     Total Cholesterol 112     Triglycerides 146     HDL Cholesterol 39     LDL Cholesterol  48     Hemoglobin A1C 6.30     Microalbumin, Urine 1.4       Data reviewed: Previous office note            Current Outpatient Medications on File Prior to Visit   Medication Sig Dispense Refill   • atorvastatin (LIPITOR) 40 MG tablet Take 1 tablet by mouth Daily. 90 tablet 1   • Daridorexant HCl (Quviviq) 25 MG tablet Take 1 tablet by mouth Every Night. 90 tablet 1   • gabapentin (NEURONTIN) 300 MG capsule Take 1-2 capsules by mouth Every Night. 60 capsule 1   • Lantus SoloStar 100 UNIT/ML injection pen Inject 15 Units under the skin into the appropriate area as directed Every Night. 15 mL 1   • lidocaine (LIDODERM) 5 % Place 1 patch on the skin as directed by provider Every 12 (Twelve) Hours. 30 patch 2   • linaclotide (Linzess) 145 MCG capsule capsule Take 1 capsule by mouth Every Morning Before Breakfast. 30 capsule 1   • lisinopril (PRINIVIL,ZESTRIL) 5 MG tablet Take 1 tablet by mouth Daily. 90 tablet 1   • loperamide (IMODIUM) 2 MG capsule Take 1 capsule by mouth 4 (Four) Times a Day As Needed for Diarrhea. 90 capsule 3   • magnesium oxide (MAG-OX) 400 MG tablet Take 1 tablet by mouth Every Night. 90 tablet 0   • metFORMIN (GLUCOPHAGE) 1000 MG tablet Take 1 tablet by mouth 2 (Two) Times a Day With Meals. TAKES 2 TABS TWICE PER DAY 90 tablet 1   • ondansetron (ZOFRAN) 4 MG tablet Take 1 tablet by mouth Every 8 (Eight) Hours As Needed for Nausea or Vomiting. 90 tablet 0   • primidone (MYSOLINE) 50 MG tablet Take 1 tablet by mouth 3 (Three) Times a Day. 90 tablet 1   • [DISCONTINUED] aspirin EC (aspirin) 325 MG tablet Take 1 tablet by mouth 2 (Two) Times a Day. (Patient not taking: Reported on 5/4/2023) 60 tablet 1   • [DISCONTINUED] HYDROcodone-acetaminophen (NORCO) 5-325 MG per tablet Take 1 tablet by mouth 3 (Three) Times a Day As Needed. (Patient not  taking: Reported on 5/4/2023)       Current Facility-Administered Medications on File Prior to Visit   Medication Dose Route Frequency Provider Last Rate Last Admin   • mupirocin (BACTROBAN) 2 % nasal ointment   Nasal BID Estevan Sharma MD            Assessment and Plan  Diagnoses and all orders for this visit:    1. Follow-up exam (Primary)    2. Type 2 diabetes mellitus without complication, with long-term current use of insulin  Comments:  Discussed with patient to continue on current medication.  Patient verbalized understanding    3. Chronic midline low back pain without sciatica  Comments:  Encourage patient to keep appointment at pain management, we will prescribe some tramadol until she sees pain management.  Patient verbalized understanding  Orders:  -     Ambulatory Referral to Physical Therapy Evaluate and treat  -     traMADol (ULTRAM) 50 MG tablet; Take 1 tablet by mouth Every 6 (Six) Hours As Needed for Moderate Pain or Severe Pain.  Dispense: 60 tablet; Refill: 1        Follow Up   Return in about 1 month (around 6/4/2023) for Recheck.  Patient was given instructions and counseling regarding her condition or for health maintenance advice. Please see specific information pulled into the AVS if appropriate.       Part of this note may be electronic transcription/translation of spoken language to printed text using the Dragon dictation system

## 2023-05-11 ENCOUNTER — PATIENT OUTREACH (OUTPATIENT)
Dept: CASE MANAGEMENT | Facility: OTHER | Age: 78
End: 2023-05-11
Payer: MEDICARE

## 2023-05-11 ENCOUNTER — TELEPHONE (OUTPATIENT)
Dept: CASE MANAGEMENT | Facility: OTHER | Age: 78
End: 2023-05-11
Payer: MEDICARE

## 2023-05-11 DIAGNOSIS — G89.29 OTHER CHRONIC PAIN: Primary | ICD-10-CM

## 2023-05-11 NOTE — OUTREACH NOTE
AMBULATORY CASE MANAGEMENT NOTE    Name and Relationship of Patient/Support Person: Maureen Hernandez C - Self    CCM Interim Update    Patient reports her pain is better controlled now since starting on the Tramadol.     Patient reports she is participating in water aerobics multiple times a week and taking walks outside when the weather permits. Patient states she feels like she is finally getting her life back since injuring her back again.     HH plans to discharge next week and patient will then continue outpatient physical therapy in June.     Care Plan/Goals Reviewed & Completed.     Adult Patient Profile  Questions/Answers    Flowsheet Row Most Recent Value   Symptoms/Conditions Managed at Home chronic pain   Chronic Pain Frequency frequent   Chronic Pain Quality aching   Chronic Pain Associated Signs/Symptoms weakness   Chronic Pain Management Strategies exercise, routine screening, positioning, medication therapy, medical device   Chronic Pain Self-Management Outcome 5 (very good)              Mimi IRENE  Ambulatory Case Management    5/11/2023, 11:35 EDT

## 2023-05-16 ENCOUNTER — HOSPITAL ENCOUNTER (OUTPATIENT)
Dept: BONE DENSITY | Facility: HOSPITAL | Age: 78
Discharge: HOME | End: 2023-05-16
Admitting: NURSE PRACTITIONER
Payer: MEDICARE

## 2023-05-16 DIAGNOSIS — Z78.0 POST-MENOPAUSAL: ICD-10-CM

## 2023-05-16 PROCEDURE — 77080 DXA BONE DENSITY AXIAL: CPT

## 2023-05-31 ENCOUNTER — PATIENT OUTREACH (OUTPATIENT)
Dept: CASE MANAGEMENT | Facility: OTHER | Age: 78
End: 2023-05-31

## 2023-05-31 DIAGNOSIS — G89.29 OTHER CHRONIC PAIN: Primary | ICD-10-CM

## 2023-05-31 DIAGNOSIS — R53.1 GENERALIZED WEAKNESS: ICD-10-CM

## 2023-05-31 DIAGNOSIS — M51.36 DEGENERATIVE DISC DISEASE, LUMBAR: ICD-10-CM

## 2023-05-31 DIAGNOSIS — Z79.4 TYPE 2 DIABETES MELLITUS WITHOUT COMPLICATION, WITH LONG-TERM CURRENT USE OF INSULIN: ICD-10-CM

## 2023-05-31 DIAGNOSIS — E11.9 TYPE 2 DIABETES MELLITUS WITHOUT COMPLICATION, WITH LONG-TERM CURRENT USE OF INSULIN: ICD-10-CM

## 2023-05-31 NOTE — OUTREACH NOTE
[facility] End of Month Documentation    This Chronic Medical Management Care Plan for Maureen Hernandez, 78 y.o. female, has been monitored and managed; reviewed; complete and a new plan of care implemented for the month of May.  A cumulative time of 26 minutes was spent on this patient record this month, including chart review; phone call with patient; face to face visit with patient.    Regarding the patient's problems: has Pain of right sacroiliac joint; Degenerative disc disease, lumbar; Primary osteoarthritis of right hip; and Hip pain, right on their problem list., the following items were addressed: medical records; medications and any changes can be found within the plan section of the note.  A detailed listing of time spent for chronic care management is tracked within each outreach encounter.  Current medications include:  has a current medication list which includes the following prescription(s): atorvastatin, quviviq, gabapentin, lantus solostar, lidocaine, linaclotide, lisinopril, loperamide, magnesium oxide, metformin, ondansetron, primidone, and tramadol, and the following Facility-Administered Medications: mupirocin. and the patient is reported to be patient is compliant with medication protocol,  Medications are reported to be effective.  Regarding these diagnoses, referrals were made to the following provider(s): Pain Management.  All notes on chart for PCP to review.    The patient was monitored remotely for medications; pain; activity level; mood & behavior.    The patient's physical needs include:  physical healthcare; medication education.     The patient's mental support needs include:  continued support    The patient's cognitive support needs include:  medication; health care    The patient's psychosocial support needs include:  continued support; medication management or adherence    The patient's functional needs include: medication education; physical healthcare    The patient's environmental  needs include:  not applicable    Care Plan overall comments:  No data recorded    Refer to previous outreach notes for more information on the areas listed above.    Monthly Billing Diagnoses  (G89.29) Other chronic pain    (R53.1) Generalized weakness    (M51.36) Degenerative disc disease, lumbar    (E11.9,  Z79.4) Type 2 diabetes mellitus without complication, with long-term current use of insulin    Medications   · Medications have been reconciled    Care Plan progress this month:      Recently Modified Care Plans Updates made since 4/30/2023 12:00 AM     Frailty Syndrome (Adult)         Problem Priority Last Modified     Disease Progression (Frailty) --  3/28/2023 10:15 AM by Mimi Buitrago RN              Goal Recent Progress Last Modified     Disease Progression Prevented or Minimized --  3/28/2023 10:15 AM by Mimi Buitrago, PURVI     Evidence-based guidance:   Anticipate referral to pharmacist for recognition of exposure to potentially inappropriate medication, such as aspirin, statin, antihypertensive, diuretic, bisphosphonate, analgesic, laxative and antidepressant, hypnotic.   Assess for presence of frailty indicators.   Develop individualized, interprofessional combination therapy plan that may slow progression of frailty.   Facilitate opportunity for shared decision-making regarding individualized medication de-prescribing plan; include plan for weaning when necessary.   Prevent infection by use of skin and hand hygiene, as well as oral and dental care; avoid aspiration and change position frequently; receive flu and pneumonia vaccines.   Review medication list for those that may contribute to the development of pneumonia, such as antipsychotic or sedative and those that cause dry mouth or achlorhydria.   Promote a regular daily exercise program based on tolerance, ability and patient choice that will support positive thinking about disease or aging process and reduce fear of falling.  "  Provide frequent encouragement and praise that supports positive view of aging; avoid use of term \"frailty\" that contributes to negative self-regard.    Notes:            Task Due Date Last Modified     Alleviate Barriers to Frailty Treatment --  5/4/2023 10:05 AM by Mimi Buitrago RN     Care Management Activities:      - positive thinking of aging encouraged      Notes:              Problem Priority Last Modified     Malnutrition (Frailty) --  3/28/2023 10:15 AM by Mimi Buitrago RN              Goal Recent Progress Last Modified     Malnutrition Prevented --  3/28/2023 10:15 AM by Mimi Buitrago RN     Evidence-based guidance:   Anticipate supplementation with oral nutrition supplements if unable to meet energy and protein requirements through usual foods and beverages.   Anticipate supplementation with vitamin D2 or D3 and calcium when insufficiency is suspected or confirmed.   Complete a comprehensive nutrition assessment that includes the identification of macro and micro deficiencies, barriers to eating, cognition and mental status, as well as social determinants of health.   Mutually determine weight goal focusing on preservation or increase in lean body mass.   Provide guidance to caregiver regarding the need to offer feeding assistance in a way that is not demeaning and promotes as much independence as possible.   Provide ongoing encouragement and support as diet changes are made and supplements are added, including home visits and telephone calls.    Notes:            Task Due Date Last Modified     Optimize Nutrition Status --  5/4/2023 10:05 AM by Mimi Buitrago RN     Care Management Activities:      - support and encouragement provided      Notes:              Problem Priority Last Modified     Functional Decline (Frailty) --  3/28/2023 10:15 AM by Mimi Buitrago RN              Goal Recent Progress Last Modified     Functional Decline Minimized --  " 3/28/2023 10:15 AM by Mimi Buitrago RN     Evidence-based guidance:   Assess fall risk, including balance and gait impairment, muscle weakness, diminished vision or hearing, environmental hazards, effects of medication and dehydration.   Assess and review gait speed, decreased muscle strength or impaired functional mobility; consider use of validated tool if available.   Prepare patient for rehabilitation services to develop an individualized activity and exercise program as indicated, such as progressive resistance strengthening, balance and activity tolerance training or home exercise program.   Prevent falls with environmental adjustment; encourage compliance with exercise program, management of incontinence and adequate vitamin D and calcium intake from food or supplements.   Promote gradual increase in intensity of activity and exercise as tolerated, such as duration, frequency, exercise repetition and sets and intensity.   Provide guidance and interventions aimed at fall prevention.   Review or assess presence of reduced muscle mass or results of dual-energy x-ray absorptiometry.    Notes:            Task Due Date Last Modified     Maintain or Improve Strength and Functional Ability --  5/4/2023 10:06 AM by Mimi Buitrago RN     Care Management Activities:      - participation in rehabilitation therapy encouraged      Notes:     5/4/23: Patient has been working with Physical Therapy through Home Health Services; Would like to move to Outpatient Physical Therapy                 Chronic Pain (Adult)         Problem Priority Last Modified     Pain Management Plan (Chronic Pain) --  5/4/2023 10:03 AM by Mimi Buitrago RN              Goal Recent Progress Last Modified     Pain Management Plan Developed --  5/4/2023 10:03 AM by Mimi Buitrago RN     Evidence-based guidance:   Acknowledge patient as the expert in pain self-management.   Partner to set a comfort goal that allows  "for return to work, school, usual activity and acceptable quality of life.   Assess pain level, usual behavior with and without pain and symptoms that commonly occur with chronic pain, such as fatigue, sleep problems, cognitive and mood disturbance; use a consistent pain scale.   Determine if pain is associated with mobility or at rest, location, intensity, frequency, duration, recurrence, pattern, triggers, relieving factors and description, such as cramping, burning or aching.    Mutually develop an interdisciplinary, multi-modal and detailed pain management plan with patient and family or caregiver; track the patient's response to pain management and adjust plan as needed.    Notes:            Task Due Date Last Modified     Partner to Develop Chronic Pain Management Plan --  5/11/2023 11:29 AM by Mimi Buitrago RN     Care Management Activities:      - mutually acceptable comfort goal set  - pain assessed  - pain treatment goals reviewed  - patient response to treatment assessed  - sharing of pain management plan with teachers and other caregivers encouraged      Notes:              Problem Priority Last Modified     Chronic Pain Management (Chronic Pain) --  5/4/2023 10:03 AM by Mimi Buitrago RN              Goal Recent Progress Last Modified     Chronic Pain Managed --  5/4/2023 10:03 AM by Mimi Buitrago RN     Evidence-based guidance:   Address common beliefs about pain, such as pain is to be endured, a normal part of aging or that it is not \"real\"; feelings of resignation that nothing can be done and that complaining will be a sign of weakness.   Assess pain level, treatment efficacy and patient response at regular intervals using a consistent pain scale.   Assess pain using self-report (most reliable), family/caregiver report, validated pain scale; consider impact on quality of life.   Determine if pain is associated with mobility or at rest, location, intensity, frequency, " duration, recurrence, pattern and description (e.g., cramping, burning, aching), triggers and relieving factors.    Anticipate referral to pain education program, pain management support or community resources for specific diagnoses (e.g., cancer, fibromyalgia, multiple sclerosis).   Explore fears associated with anticipated or imagined pain; encourage acceptance-based approaches.   Encourage exposure to experiences previously avoided due to fear of pain.   Anticipate referral to pain management specialist, physical therapist, addiction specialist (if history of substance use), psychotherapist; advocate for consultation with pharmacist.   Initiate nonpharmacologic measures, such as cognitive behavior therapy, mindfulness, guided imagery, massage, distraction, relaxation, chiropractic manipulation, dietary supplements or acupuncture.   Provide multimodal treatment interventions, such as physical activity, therapeutic exercise, yoga, TENS (transcutaneous electrical nerve stimulation) and manual therapy.   Train in functional activity modifications, such as body mechanics, posture, ergonomics, energy conservation and activity pacing.   Encourage use of local anesthetic or analgesic therapy as an adjunct for pain control (e.g., lidocaine patch, capsaicin cream, topical nonsteroidal anti-inflammatory drugs).   Prepare patient for use of pharmacologic therapy in a stepped approach that may include acetaminophen, nonsteroidal anti-inflammatory drugs, opioid, antiepileptic, antidepressant or nonbenzodiazepine muscle relaxant.   Review efficacy, tolerability, adherence and manage medication-induced side effects.    Notes:            Task Due Date Last Modified     Alleviate Barriers to Chronic Pain Management --  5/11/2023 11:30 AM by Mimi Buitrago RN     Care Management Activities:      - effectiveness of pharmacologic therapy monitored  - enrollment in pain education program arranged  - misuse of pain medication  assessed  - pain assessed  - participation in physical therapy encouraged  - premedication prior to activity encouraged      Notes:     5/4/23: Pain Management Consult on 5/15/23.                        Instructions   · Patient was provided an electronic copy of care plan  · CCM services were explained and offered and patient has accepted these services.  · Patient has given their written consent to receive CCM services and understands that this includes the authorization of electronic communication of medical information with the other treating providers.  · Patient understands that they may stop CCM services at any time and these changes will be effective at the end of the calendar month and will effectively revocate the agreement of CCM services.  · Patient understands that only one practitioner can furnish and be paid for CCM services during one calendar month.  Patient also understands that there may be co-payment or deductible fees in association with CCM services.  · Patient will continue with at least monthly follow-up calls with the Ambulatory .    Mimi IRENE  Ambulatory Case Management    5/31/2023, 09:57 EDT

## 2023-06-02 ENCOUNTER — TREATMENT (OUTPATIENT)
Dept: PHYSICAL THERAPY | Facility: CLINIC | Age: 78
End: 2023-06-02

## 2023-06-02 DIAGNOSIS — R26.2 DIFFICULTY WALKING: ICD-10-CM

## 2023-06-02 DIAGNOSIS — G89.29 CHRONIC MIDLINE THORACIC BACK PAIN: Primary | ICD-10-CM

## 2023-06-02 DIAGNOSIS — M54.50 CHRONIC MIDLINE LOW BACK PAIN WITHOUT SCIATICA: ICD-10-CM

## 2023-06-02 DIAGNOSIS — M54.6 CHRONIC MIDLINE THORACIC BACK PAIN: Primary | ICD-10-CM

## 2023-06-02 DIAGNOSIS — G89.29 CHRONIC MIDLINE LOW BACK PAIN WITHOUT SCIATICA: ICD-10-CM

## 2023-06-02 PROCEDURE — 97530 THERAPEUTIC ACTIVITIES: CPT | Performed by: PHYSICAL THERAPIST

## 2023-06-02 PROCEDURE — 97110 THERAPEUTIC EXERCISES: CPT | Performed by: PHYSICAL THERAPIST

## 2023-06-02 PROCEDURE — 97161 PT EVAL LOW COMPLEX 20 MIN: CPT | Performed by: PHYSICAL THERAPIST

## 2023-06-02 NOTE — PROGRESS NOTES
Physical Therapy Initial Evaluation and Plan of Care  [address], [address] [place] [address]        Patient: Maureen Hernandez   : 1945  Diagnosis/ICD-10 Code:  Chronic midline thoracic back pain [M54.6, G89.29]  Referring practitioner: JARED Ross  Date of Initial Visit: 2023  Today's Date: 2023  Patient seen for 1 sessions         Lumbar MRI results:  IMPRESSION:                 1. Degenerative disc disease throughout the visualized lower thoracic and lumbar spine as detailed   above.  There is 3 mm of anterior listhesis of T10 on T11 resulting in at least mild canal   stenosis.  2. Status post fusion from the L2 through S1 levels.  No significant canal stenosis or neural   foraminal narrowing within the lumbar spine.    Subjective Questionnaire: Oswestry:       Subjective Evaluation    History of Present Illness  Mechanism of injury: Pt reports that she has been having lower/thoracic spine pain for several years but she had a massage in February which has increased pain significantly.  Pt states that her pain was so bad she stayed in bed for about 2.5 moths.  Pt reports that she had home health until last week to address pain and weakness.  Pt reports that she goes to water aerobic daily which decreases pain.  Pt reports pain is localized mainly in thoracic spine.  Pt reports that she is scheduled for an injection for her lower,thoracic back.  Pt reports that she is not having pain down UEs or LEs.  Pt reports that prolonged walking, standing increases her pain and sitting decreases her pain.  Pt reports that she is taking tramadol and uses THC for pain control.  Pt states that she has lost 30# due to pain.  Pt would get back to gardening.  Pt is using walker intermittently.  Pt reports that she had balance issues prior to injury.  Pt reports that she had 4 falls over the past year, but none recently.   Pt reports that she has an essential tremor.      Pain  Current pain  ratin  At best pain ratin  At worst pain ratin  Quality: dull ache, tight, pulling and discomfort  Aggravating factors: squatting, lifting, prolonged positioning, repetitive movement, movement and standing  Progression: improved    Social Support  Lives with: spouse    Treatments  Previous treatment: home therapy  Patient Goals  Patient goals for therapy: decreased pain, improved balance, increased motion, increased strength and return to sport/leisure activities           Objective          Static Posture     Head  Forward.    Shoulders  Elevated and rounded.    Scapulae  Left protracted and right protracted.    Thoracic Spine  Hyperkyphosis.    Lumbar Spine   Decreased lordosis.     Knee   Knee (Left): Flexed.   Knee (Right): Flexed.     Comments  R LE longer in supine than L LE    Active Range of Motion     Additional Active Range of Motion Details  Lumbar AROM:   Flexion: 25% limited, no pain  Extension: WFL, pain  Rotation: 25% limited bilaterally, pain  Lateral flexion: 50% bilaterally      Strength/Myotome Testing     Left Shoulder     Planes of Motion   Flexion: 4   Extension: 4   Abduction: 4     Right Shoulder     Planes of Motion   Flexion: 4-   Extension: 4   Abduction: 4-     Left Elbow   Flexion: 4+  Extension: 4+    Right Elbow   Flexion: 4+  Extension: 4+    Left Wrist/Hand   Wrist extension: 4-  Wrist flexion: 4-    Right Wrist/Hand   Wrist extension: 4+  Wrist flexion: 4+    Left Hip   Planes of Motion   Flexion: 4-  Extension: 4-  Abduction: 4-  Adduction: 4-    Right Hip   Planes of Motion   Flexion: 4-  Extension: 4-  Abduction: 4-  Adduction: 4-    Left Knee   Flexion: 4  Extension: 4    Right Knee   Flexion: 4  Extension: 4    Left Ankle/Foot   Dorsiflexion: 4+    Right Ankle/Foot   Dorsiflexion: 4+    Tests     Lumbar     Left   Negative passive SLR and quadrant.     Right   Negative passive SLR and quadrant.     Ambulation     Observational Gait   Decreased walking speed.   Base  of support: decreased    Additional Observational Gait Details  Pt demonstrates mildly decreased bilateral heel strike and push off.  Pt demonstrates significant UE/LE tremors during gait, which worsen with balance activities.     General Comments     Lumbar Comments  Light touch sensation normal in bilateral LEs, UEs  Bilateral hamstrings, piriformis, hip flexors, upper trapezius, lumbar/thoracic paraspinal tightness noted  Fair PPT activation noted       Balance testing:  Feet together eyes open: 25 secs  Feet together eyes closed: 10 sec before LOB  Tandem stance: 5 secs before LOB    Assessment & Plan     Assessment  Impairments: abnormal or restricted ROM, activity intolerance, impaired physical strength, lacks appropriate home exercise program and pain with function  Functional Limitations: lifting, sleeping, walking, uncomfortable because of pain, sitting, standing and unable to perform repetitive tasks  Assessment details: Patient presents with signs/symptoms consistent with lower and thoracic spine pain without radiculopathy as well as balance deficits including decreased lumbar ROM, bilateral hip, core and scapular weakness, musculature tightness and reports of pain limiting function during ADLs as shown on the MILAN. Patient would benefit from skilled PT services in order to address deficits limiting function at this time.  HEP was given to patient this session and education on HEP/diagnosis provided to patient.        Goals  Plan Goals: 1. The patient complains of low back/thoracic pain.  LTG 1: 12 weeks:  The patient will report a pain rating of 2/10 or better in order to improve  tolerance to activities of daily living and improve sleep quality.  STATUS:  New  STG 1a: 6 weeks:  The patient will report a pain rating of 3/10 or better.  STATUS:  New    2. The patient demonstrates weakness of the bilateral hips.  LTG 2: 12 weeks:  The patient will demonstrate 4+ /5 strength for bilateral hip flexion,  abduction,  and extension in order to improve hip stability.  STATUS:  New  STG 2a: 6 weeks:  The patient will demonstrate 4 /5 strength for bilateral hip flexion, abduction,  and extension.  STATUS:  New    3. The patient has gait dysfunction.  LTG 3: 12 weeks:  The patient will ambulate without assistive device, independently, for   community distances with minimal limp in order to improve mobility and allow   patient to perform activities such as grocery shopping with greater ease.  STATUS:  New    4. Mobility: Walking/Moving Around Functional Limitation    LTG 1: 12 weeks:  The patient will demonstrate 1-19 % limitation by achieving a score of 4/45 on the MILAN.  STATUS:  New  STG 1: 12 weeks:  The patient will demonstrate 1-19 % limitation by achieving a score of 8/45 on the MILAN.  STATUS:  New      Plan  Therapy options: will be seen for skilled therapy services  Planned modality interventions: cryotherapy, thermotherapy (hydrocollator packs) and TENS  Planned therapy interventions: manual therapy, abdominal trunk stabilization, ADL retraining, neuromuscular re-education, body mechanics training, postural training, soft tissue mobilization, flexibility, spinal/joint mobilization, functional ROM exercises, strengthening, stretching, gait training, home exercise program, therapeutic activities and joint mobilization  Frequency: 2x week  Duration in weeks: 12  Treatment plan discussed with: patient        Timed:         Manual Therapy:    0     mins  88837;     Therapeutic Exercise:    15     mins  46004;     Neuromuscular Ena:    0    mins  85994;    Therapeutic Activity:     8     mins  43399;     Gait Trainin     mins  18875;     Ultrasound:     0     mins  79408;    Ionto                               0    mins   57652  Self-care  __0__ mins [id number]    Un-Timed:  Electrical Stimulation:    0     mins  57995 ( );  Traction     0     mins 52334  Low Eval     35     Mins  56679  Mod Eval     0      Mins  40995  High Eval                       0     Mins  15789  Hot pack     0     Mins    Cold pack                       0     Mins      Timed Treatment:   23   mins   Total Treatment:     58   mins    PT SIGNATURE: Elise Bowen PT      Electronically signed 6/2/2023  [place] License: [id number]    Initial Certification    Certification Period: 6/2/2023 thru 8/30/2023  NPI: [id number]  I certify that the therapy services are furnished while this patient is under my care.  The services outlined above are required by this patient, and will be reviewed every 90 days.     PHYSICIAN: Elodia Latham, OLIVIA      DATE:     Please sign and return via fax to [phone].. Thank you, [facility].

## 2023-06-05 ENCOUNTER — OFFICE VISIT (OUTPATIENT)
Dept: FAMILY MEDICINE CLINIC | Facility: CLINIC | Age: 78
End: 2023-06-05
Payer: MEDICARE

## 2023-06-05 ENCOUNTER — TREATMENT (OUTPATIENT)
Dept: PHYSICAL THERAPY | Facility: CLINIC | Age: 78
End: 2023-06-05
Payer: MEDICARE

## 2023-06-05 VITALS
HEART RATE: 83 BPM | WEIGHT: 126 LBS | SYSTOLIC BLOOD PRESSURE: 106 MMHG | HEIGHT: 63 IN | BODY MASS INDEX: 22.32 KG/M2 | OXYGEN SATURATION: 98 % | TEMPERATURE: 98.3 F | DIASTOLIC BLOOD PRESSURE: 58 MMHG

## 2023-06-05 DIAGNOSIS — E11.649 TYPE 2 DIABETES MELLITUS WITH HYPOGLYCEMIA WITHOUT COMA, WITH LONG-TERM CURRENT USE OF INSULIN: ICD-10-CM

## 2023-06-05 DIAGNOSIS — K31.84 GASTROPARESIS DUE TO DM: ICD-10-CM

## 2023-06-05 DIAGNOSIS — M54.6 CHRONIC MIDLINE THORACIC BACK PAIN: Primary | ICD-10-CM

## 2023-06-05 DIAGNOSIS — Z09 FOLLOW-UP EXAM: Primary | ICD-10-CM

## 2023-06-05 DIAGNOSIS — M54.50 CHRONIC MIDLINE LOW BACK PAIN WITHOUT SCIATICA: ICD-10-CM

## 2023-06-05 DIAGNOSIS — G89.29 CHRONIC MIDLINE LOW BACK PAIN WITHOUT SCIATICA: ICD-10-CM

## 2023-06-05 DIAGNOSIS — Z79.4 TYPE 2 DIABETES MELLITUS WITH HYPOGLYCEMIA WITHOUT COMA, WITH LONG-TERM CURRENT USE OF INSULIN: ICD-10-CM

## 2023-06-05 DIAGNOSIS — G89.29 CHRONIC MIDLINE THORACIC BACK PAIN: Primary | ICD-10-CM

## 2023-06-05 DIAGNOSIS — E11.43 GASTROPARESIS DUE TO DM: ICD-10-CM

## 2023-06-05 DIAGNOSIS — R26.89 DECREASED MOBILITY: ICD-10-CM

## 2023-06-05 DIAGNOSIS — R26.2 DIFFICULTY WALKING: ICD-10-CM

## 2023-06-05 PROCEDURE — 97110 THERAPEUTIC EXERCISES: CPT | Performed by: PHYSICAL THERAPIST

## 2023-06-05 PROCEDURE — 97530 THERAPEUTIC ACTIVITIES: CPT | Performed by: PHYSICAL THERAPIST

## 2023-06-05 PROCEDURE — 97112 NEUROMUSCULAR REEDUCATION: CPT | Performed by: PHYSICAL THERAPIST

## 2023-06-05 RX ORDER — GABAPENTIN 300 MG/1
300-600 CAPSULE ORAL NIGHTLY
Qty: 60 CAPSULE | Refills: 1 | Status: SHIPPED | OUTPATIENT
Start: 2023-06-05

## 2023-06-05 RX ORDER — INSULIN GLARGINE 100 [IU]/ML
15 INJECTION, SOLUTION SUBCUTANEOUS EVERY MORNING
Qty: 15 ML | Refills: 1
Start: 2023-06-05

## 2023-06-05 RX ORDER — TRAMADOL HYDROCHLORIDE 50 MG/1
50 TABLET ORAL EVERY 6 HOURS PRN
Qty: 60 TABLET | Refills: 1 | Status: SHIPPED | OUTPATIENT
Start: 2023-06-05

## 2023-06-05 NOTE — PROGRESS NOTES
Chief Complaint  Back Pain (1M follow up) and Med Refill    Subjective        Medical History: has a past medical history of Arthritis, DDD (degenerative disc disease), lumbar, Diabetes, GERD (gastroesophageal reflux disease), Hyperlipidemia, Palsy, Poor circulation, Sleep apnea, and Stress incontinence.     Surgical History: has a past surgical history that includes Lumbar fusion (2013); Cholecystectomy (1998); Hysterectomy; Knee surgery; Knee surgery; Total hip arthroplasty (Right, 05/06/2019); and Cataract extraction (Bilateral).     Family History: family history includes Cancer in her brother; Diabetes in her father and sister; Heart disease in her father and mother.     Social History: reports that she has never smoked. She has never used smokeless tobacco. She reports that she does not drink alcohol and does not use drugs.    Maureen Hernandez presents to [facility]  History of Present Illness  MRI back   Patient wanted to go over the results of her MRI of her L-spine.  I discussed with the patient the MRI shows degenerative disc disease throughout the entire spine from thoracic to lumbar.  Does show some mild disc bulge and mild displacement that is consistent due to degenerative disc disease.  There was no acute findings seen, she has an appointment 5/15/2023 with pain management to discuss possible epidural steroid injections and further treatment for the pain.  Patient would like to stop home physical therapy and go to physical therapy outside the home.  [facility]    Patient comes in today for a 1 month follow-up to see how her back pain and is going and see how things are progressing with physical therapy and pain management.    Patient is doing much better, she was accompanied by herself which is the first time since I started seeing the patient.  She states her pain is a 3 out of 10 today, and states it feels more just like  "arthritis pain.  She states her nerve pain is completely resolved with the gabapentin, and her pain is being controlled with the tramadol and gabapentin together.  She is still doing physical therapy on outpatient basis, has 2 trips planned this summer to go to [facility] and visit family in [place] for her daughter's wedding.  She states overall things are improving and she has a better outlook on life.    Patient does have a history of gastroparesis, states it wax and wanes in intensity.  Symptoms included nausea, vomiting and diarrhea.  She states her last episode was last week but symptoms had since resolved.  She states she typically will have diarrhea about every other day, only occurs in the evening times.  She states food selection does not make it better or worse.  She states it occurs due to her diabetes.  No concerns at this time but will note of the gastroparesis, did discuss with patient if symptoms seem to be getting worse or not resolving we can refer over to gastroenterology for further work-up and management.    Patient states she has been having episodes of her blood sugar dropping in the morning time.  Patient states she becomes symptomatic when her blood sugar gets below 70.  Patient was inquiring whether she can take her Lantus in the morning instead of night to prevent blood sugar dropping first thing in the morning.  I did discuss with patient that yes that is fine start Lantus 15 units at breakfast, continue to monitor her blood sugars, if her blood sugar is still dropping first thing in the morning please let me know and we can adjust medication dosage.  Patient verbalized understanding agreeable treatment plan.      Objective   Vital Signs:   /58 (BP Location: Left arm, Patient Position: Sitting, Cuff Size: Adult)   Pulse 83   Temp 98.3 °F (36.8 °C) (Temporal)   Ht 160 cm (63\")   Wt 57.2 kg (126 lb)   SpO2 98%   BMI 22.32 kg/m²       Wt Readings from Last 3 Encounters: "   06/05/23 57.2 kg (126 lb)   05/04/23 54.4 kg (120 lb)   04/24/23 54.5 kg (120 lb 3.2 oz)        BP Readings from Last 3 Encounters:   06/05/23 106/58   05/04/23 (!) 86/56   04/24/23 108/58        BMI is within normal parameters. No other follow-up for BMI required.       Physical Exam  Vitals reviewed.   Constitutional:       Appearance: Normal appearance. She is well-developed.   HENT:      Head: Normocephalic and atraumatic.   Eyes:      Conjunctiva/sclera: Conjunctivae normal.      Pupils: Pupils are equal, round, and reactive to light.   Cardiovascular:      Rate and Rhythm: Normal rate and regular rhythm.      Heart sounds: No murmur heard.  Pulmonary:      Effort: Pulmonary effort is normal.      Breath sounds: Normal breath sounds. No wheezing or rhonchi.   Musculoskeletal:      Comments: Back brace in place   Skin:     General: Skin is warm and dry.   Neurological:      Mental Status: She is alert and oriented to person, place, and time.      Sensory: No sensory deficit.      Gait: Gait normal.   Psychiatric:         Mood and Affect: Mood and affect normal.         Behavior: Behavior normal.         Thought Content: Thought content normal.         Judgment: Judgment normal.      Result Review :  {The following data was reviewed by OLIVIA Ross on 06/05/2023.  Common labs          3/27/2023    10:31   Common Labs   Glucose 261    BUN 18    Creatinine 0.69    Sodium 130    Potassium 4.1    Chloride 90    Calcium 9.9    Albumin 4.2    Total Bilirubin 0.3    Alkaline Phosphatase 68    AST (SGOT) 41    ALT (SGPT) 57    WBC 7.23    Hemoglobin 14.6    Hematocrit 42.0    Platelets 315    Total Cholesterol 112    Triglycerides 146    HDL Cholesterol 39    LDL Cholesterol  48    Hemoglobin A1C 6.30    Microalbumin, Urine 1.4      Data reviewed : Previous office note,             Current Outpatient Medications on File Prior to Visit   Medication Sig Dispense Refill    atorvastatin (LIPITOR) 40 MG tablet  Take 1 tablet by mouth Daily. 90 tablet 1    lidocaine (LIDODERM) 5 % Place 1 patch on the skin as directed by provider Every 12 (Twelve) Hours. 30 patch 2    lisinopril (PRINIVIL,ZESTRIL) 5 MG tablet Take 1 tablet by mouth Daily. 90 tablet 1    loperamide (IMODIUM) 2 MG capsule Take 1 capsule by mouth 4 (Four) Times a Day As Needed for Diarrhea. 90 capsule 3    magnesium oxide (MAG-OX) 400 MG tablet Take 1 tablet by mouth Every Night. 90 tablet 0    metFORMIN (GLUCOPHAGE) 1000 MG tablet Take 1 tablet by mouth 2 (Two) Times a Day With Meals. TAKES 2 TABS TWICE PER DAY 90 tablet 1    ondansetron (ZOFRAN) 4 MG tablet Take 1 tablet by mouth Every 8 (Eight) Hours As Needed for Nausea or Vomiting. 90 tablet 0    primidone (MYSOLINE) 50 MG tablet Take 1 tablet by mouth 3 (Three) Times a Day. 90 tablet 1    [DISCONTINUED] gabapentin (NEURONTIN) 300 MG capsule Take 1-2 capsules by mouth Every Night. 60 capsule 1    [DISCONTINUED] Lantus SoloStar 100 UNIT/ML injection pen Inject 15 Units under the skin into the appropriate area as directed Every Night. 15 mL 1    [DISCONTINUED] traMADol (ULTRAM) 50 MG tablet Take 1 tablet by mouth Every 6 (Six) Hours As Needed for Moderate Pain or Severe Pain. 60 tablet 1    [DISCONTINUED] Daridorexant HCl (Quviviq) 25 MG tablet Take 1 tablet by mouth Every Night. (Patient not taking: Reported on 6/5/2023) 90 tablet 1    [DISCONTINUED] linaclotide (Linzess) 145 MCG capsule capsule Take 1 capsule by mouth Every Morning Before Breakfast. 30 capsule 1     Current Facility-Administered Medications on File Prior to Visit   Medication Dose Route Frequency Provider Last Rate Last Admin    mupirocin (BACTROBAN) 2 % nasal ointment   Nasal BID Estevan Sharma MD            Assessment and Plan  Diagnoses and all orders for this visit:    1. Follow-up exam (Primary)    2. Decreased mobility due to pain in back  Comments:  Pt is improving withPT, gabapentin and pain medication.  She looks much better  today than she did 1 month ago. f/u in 3 months  Orders:  -     gabapentin (NEURONTIN) 300 MG capsule; Take 1-2 capsules by mouth Every Night.  Dispense: 60 capsule; Refill: 1    3. Chronic midline low back pain without sciatica  Comments:  Encourage patient to keep appointment at pain management, we will prescribe some tramadol until she sees pain management.  Patient verbalized understanding  Orders:  -     traMADol (ULTRAM) 50 MG tablet; Take 1 tablet by mouth Every 6 (Six) Hours As Needed for Moderate Pain or Severe Pain.  Dispense: 60 tablet; Refill: 1    4. Gastroparesis due to DM  Comments:  We will continue to monitor patient's symptoms if worsening refer over to gastroenterology    5. Type 2 diabetes mellitus with hypoglycemia without coma, with long-term current use of insulin  Comments:  Encourage patient to change medication to morning, if still developing low blood sugars call office for dose adjustment.    Other orders  -     Lantus SoloStar 100 UNIT/ML injection pen; Inject 15 Units under the skin into the appropriate area as directed Every Morning.  Dispense: 15 mL; Refill: 1        Follow Up   Return in about 3 months (around 9/5/2023) for Recheck.  Patient was given instructions and counseling regarding her condition or for health maintenance advice. Please see specific information pulled into the AVS if appropriate.       Part of this note may be electronic transcription/translation of spoken language to printed text using the Dragon dictation system

## 2023-06-05 NOTE — PROGRESS NOTES
Physical Therapy Daily Treatment Note  [address], [address] [place] [address]        Patient: Maureen Hernandez   : 1945  Diagnosis/ICD-10 Code:  Chronic midline thoracic back pain [M54.6, G89.29]  Referring practitioner: JARED Ross  Date of Initial Visit: Type: THERAPY  Noted: 2023  Today's Date: 2023  Patient seen for 2 sessions             Subjective   Maureen Hernandez reports  having 2-3/10 pain mid back region.  She reports that home exercise program is going well and states she is doing them consistently.    Objective     See Exercise Logs for complete treatment.       Assessment/Plan    Pt tolerated therapy session well - with progression of therapeutic exercises, CKC-Functional activities, Trunk Stabilization, and Manual therapy. She has improved, but continues to have deficits in Her Trunk and Bilateral Lower Extremity ROM,  Strength, Stability, and Balance; limiting functional mobility, ability to perform ADLs, and increases her risk for falls  at this time.    Progress per Plan of Care           Timed:  Manual Therapy:    0     mins  81952;  Therapeutic Exercise:    22     mins  22805;     Neuromuscular Ena:    10    mins  26748;    Therapeutic Activity:     10     mins  17805;     Gait Trainin     mins  69449;     Ultrasound:     0     mins  90547;    Electrical Stimulation:    0     mins  32396;  Iontophoresis     0     mins  05566    Untimed:  Electrical Stimulation:    0     mins  82459 ( );  Mechanical Traction:    0     mins  65140;   Fluidotherapy     0     mins  73629  Hot pack     0     mins  07031  Cold pack     0     mins  51235    Timed Treatment:   42   mins   Total Treatment:     42   mins        Sury Sanderson PTA  Physical Therapist Assistant

## 2023-06-06 ENCOUNTER — TELEPHONE (OUTPATIENT)
Dept: CASE MANAGEMENT | Facility: OTHER | Age: 78
End: 2023-06-06
Payer: MEDICARE

## 2023-06-06 NOTE — TELEPHONE ENCOUNTER
Called Patient. No Answer. LVM.     Chart Reviewed -     PCP Follow Up on 6/5/23.    - Decreased Mobility improving w/ medication regimen & outpatient PT    - Chronic Back Pain managed by Pain Management    - T2DM managed w/ insulin regimen     Follow Up in 3 Months    Will place patient in Monitoring status.

## 2023-06-07 ENCOUNTER — TELEPHONE (OUTPATIENT)
Dept: PHYSICAL THERAPY | Facility: CLINIC | Age: 78
End: 2023-06-07

## 2023-06-19 ENCOUNTER — TREATMENT (OUTPATIENT)
Dept: PHYSICAL THERAPY | Facility: CLINIC | Age: 78
End: 2023-06-19
Payer: MEDICARE

## 2023-06-19 DIAGNOSIS — R26.2 DIFFICULTY WALKING: ICD-10-CM

## 2023-06-19 DIAGNOSIS — G89.29 CHRONIC MIDLINE THORACIC BACK PAIN: Primary | ICD-10-CM

## 2023-06-19 DIAGNOSIS — M54.50 CHRONIC MIDLINE LOW BACK PAIN WITHOUT SCIATICA: ICD-10-CM

## 2023-06-19 DIAGNOSIS — G89.29 CHRONIC MIDLINE LOW BACK PAIN WITHOUT SCIATICA: ICD-10-CM

## 2023-06-19 DIAGNOSIS — M54.6 CHRONIC MIDLINE THORACIC BACK PAIN: Primary | ICD-10-CM

## 2023-06-19 PROCEDURE — 97530 THERAPEUTIC ACTIVITIES: CPT | Performed by: PHYSICAL THERAPIST

## 2023-06-19 PROCEDURE — 97110 THERAPEUTIC EXERCISES: CPT | Performed by: PHYSICAL THERAPIST

## 2023-06-19 NOTE — PROGRESS NOTES
Physical Therapy Daily Treatment Note  [address], [address], [place] [address]      Patient: Maureen Hernandez   : 1945  Diagnosis/ICD-10 Code:  Chronic midline thoracic back pain [M54.6, G89.29]  Referring practitioner: JARED Ross  Date of Initial Visit: Type: THERAPY  Noted: 2023  Today's Date: 2023  Patient seen for 3 sessions             Subjective   Maureen Hernandez reports: increased weakness and fatigue after illness the past couple of weeks.     Objective   See Exercise, Manual, and Modality Logs for complete treatment.       Assessment/Plan  Patient tolerated all exercises well today but continues to demonstrate bilateral hip/core/scapular strength and balance deficits limiting function.  Exercise progressions held due to increased weakness/fatigue after recent illness.  Continue to progress per patient tolerance.    Progress per Plan of Care           Timed:  Manual Therapy:    0     mins  72890;  Therapeutic Exercise:    20     mins  73050;     Neuromuscular Ena:    0    mins  45614;    Therapeutic Activity:     10     mins  75417;     Gait Trainin     mins  50538;     Ultrasound:     0     mins  45784;    Electrical Stimulation:    0     mins  61230;  Iontophoresis     0     mins  42669    Untimed:  Electrical Stimulation:    0     mins  57065 ( );  Mechanical Traction:    0     mins  37964;   Fluidotherapy     0     mins  41801  Hot pack     0     Mins    Cold pack                       0     Mins     Timed Treatment:   30   mins   Total Treatment:     30   mins        Elise Bowen PT    Electronically signed [unfilled]  [place] License: [id number]  NPI number: [id number]

## 2023-07-24 ENCOUNTER — TELEPHONE (OUTPATIENT)
Dept: FAMILY MEDICINE CLINIC | Facility: CLINIC | Age: 78
End: 2023-07-24
Payer: MEDICARE

## 2023-07-24 NOTE — TELEPHONE ENCOUNTER
Phone call received from patient who stated her blood sugar is 65 now. Reviewed dietary choices with snacks in the evening hours with decreased intake and education done with patient. Instructed to call back if blood sugar does not go up with increased oral intake, to go to urgent care for followup with voiced agreement. Patient stated that she had lost a lot of weight and is out of town now in [place]. Will follow up with provider.

## 2023-08-14 ENCOUNTER — TELEPHONE (OUTPATIENT)
Dept: FAMILY MEDICINE CLINIC | Facility: CLINIC | Age: 78
End: 2023-08-14
Payer: MEDICARE

## 2023-08-14 NOTE — TELEPHONE ENCOUNTER
Spoke to Maureen Hernandez to inquire on patient's symptoms. Scheduled patient for in-office appt with PCP on 08/21/2023 at 1000. Patient acknowledged appt date and time, no further questions/concerns.

## 2023-08-14 NOTE — TELEPHONE ENCOUNTER
Caller: Maureen Hernandez    Relationship: Self    Best call back number:     [phone]        What orders are you requesting (i.e. lab or imaging): UPPER GI    In what timeframe would the patient need to come in: PATIENT WILL BE BACK FROM OUT OF TOWN ON 8.20.2023    Additional notes: PATIENT STATES SHE HAS BEEN EXPERIENCING A LOT OF STOMACH DISCOMFORT AND WOULD LIKE AN ORDER FOR AN UPPER GI TO BE DONE.

## 2023-08-21 ENCOUNTER — OFFICE VISIT (OUTPATIENT)
Dept: FAMILY MEDICINE CLINIC | Facility: CLINIC | Age: 78
End: 2023-08-21
Payer: MEDICARE

## 2023-08-21 VITALS
SYSTOLIC BLOOD PRESSURE: 90 MMHG | HEIGHT: 63 IN | OXYGEN SATURATION: 97 % | HEART RATE: 89 BPM | BODY MASS INDEX: 21.26 KG/M2 | DIASTOLIC BLOOD PRESSURE: 50 MMHG | WEIGHT: 120 LBS | TEMPERATURE: 97.9 F

## 2023-08-21 DIAGNOSIS — K21.9 GASTROESOPHAGEAL REFLUX DISEASE, UNSPECIFIED WHETHER ESOPHAGITIS PRESENT: ICD-10-CM

## 2023-08-21 DIAGNOSIS — R10.10 UPPER ABDOMINAL PAIN: Primary | ICD-10-CM

## 2023-08-21 DIAGNOSIS — Z87.898 HX OF ULCER DISEASE: ICD-10-CM

## 2023-08-21 LAB
ALBUMIN SERPL-MCNC: 4.5 G/DL (ref 3.5–5.2)
ALBUMIN/GLOB SERPL: 1.7 G/DL
ALP SERPL-CCNC: 50 U/L (ref 39–117)
ALT SERPL W P-5'-P-CCNC: 41 U/L (ref 1–33)
AMPHET+METHAMPHET UR QL: NEGATIVE
ANION GAP SERPL CALCULATED.3IONS-SCNC: 13.7 MMOL/L (ref 5–15)
AST SERPL-CCNC: 40 U/L (ref 1–32)
BACTERIA UR QL AUTO: ABNORMAL /HPF
BARBITURATES UR QL SCN: POSITIVE
BASOPHILS # BLD AUTO: 0.02 10*3/MM3 (ref 0–0.2)
BASOPHILS NFR BLD AUTO: 0.4 % (ref 0–1.5)
BENZODIAZ UR QL SCN: NEGATIVE
BILIRUB SERPL-MCNC: <0.2 MG/DL (ref 0–1.2)
BILIRUB UR QL STRIP: NEGATIVE
BUN SERPL-MCNC: 25 MG/DL (ref 8–23)
BUN/CREAT SERPL: 30.9 (ref 7–25)
CALCIUM SPEC-SCNC: 10.3 MG/DL (ref 8.6–10.5)
CANNABINOIDS SERPL QL: NEGATIVE
CHLORIDE SERPL-SCNC: 97 MMOL/L (ref 98–107)
CLARITY UR: CLEAR
CO2 SERPL-SCNC: 26.3 MMOL/L (ref 22–29)
COCAINE UR QL: NEGATIVE
COLOR UR: YELLOW
CREAT SERPL-MCNC: 0.81 MG/DL (ref 0.57–1)
DEPRECATED RDW RBC AUTO: 43.9 FL (ref 37–54)
EGFRCR SERPLBLD CKD-EPI 2021: 75.3 ML/MIN/1.73
EOSINOPHIL # BLD AUTO: 0.17 10*3/MM3 (ref 0–0.4)
EOSINOPHIL NFR BLD AUTO: 3.3 % (ref 0.3–6.2)
ERYTHROCYTE [DISTWIDTH] IN BLOOD BY AUTOMATED COUNT: 12.4 % (ref 12.3–15.4)
FENTANYL UR-MCNC: NEGATIVE NG/ML
GLOBULIN UR ELPH-MCNC: 2.6 GM/DL
GLUCOSE SERPL-MCNC: 80 MG/DL (ref 65–99)
GLUCOSE UR STRIP-MCNC: NEGATIVE MG/DL
HCT VFR BLD AUTO: 38.9 % (ref 34–46.6)
HGB BLD-MCNC: 13.4 G/DL (ref 12–15.9)
HGB UR QL STRIP.AUTO: NEGATIVE
HYALINE CASTS UR QL AUTO: ABNORMAL /LPF
IMM GRANULOCYTES # BLD AUTO: 0.01 10*3/MM3 (ref 0–0.05)
IMM GRANULOCYTES NFR BLD AUTO: 0.2 % (ref 0–0.5)
KETONES UR QL STRIP: NEGATIVE
LEUKOCYTE ESTERASE UR QL STRIP.AUTO: ABNORMAL
LIPASE SERPL-CCNC: 15 U/L (ref 13–60)
LYMPHOCYTES # BLD AUTO: 1.24 10*3/MM3 (ref 0.7–3.1)
LYMPHOCYTES NFR BLD AUTO: 24.3 % (ref 19.6–45.3)
MCH RBC QN AUTO: 33 PG (ref 26.6–33)
MCHC RBC AUTO-ENTMCNC: 34.4 G/DL (ref 31.5–35.7)
MCV RBC AUTO: 95.8 FL (ref 79–97)
METHADONE UR QL SCN: NEGATIVE
MONOCYTES # BLD AUTO: 0.32 10*3/MM3 (ref 0.1–0.9)
MONOCYTES NFR BLD AUTO: 6.3 % (ref 5–12)
NEUTROPHILS NFR BLD AUTO: 3.35 10*3/MM3 (ref 1.7–7)
NEUTROPHILS NFR BLD AUTO: 65.5 % (ref 42.7–76)
NITRITE UR QL STRIP: NEGATIVE
NRBC BLD AUTO-RTO: 0 /100 WBC (ref 0–0.2)
OPIATES UR QL: NEGATIVE
OXYCODONE UR QL SCN: NEGATIVE
PH UR STRIP.AUTO: 7 [PH] (ref 5–8)
PLATELET # BLD AUTO: 337 10*3/MM3 (ref 140–450)
PMV BLD AUTO: 10.2 FL (ref 6–12)
POTASSIUM SERPL-SCNC: 4.6 MMOL/L (ref 3.5–5.2)
PROT SERPL-MCNC: 7.1 G/DL (ref 6–8.5)
PROT UR QL STRIP: ABNORMAL
RBC # BLD AUTO: 4.06 10*6/MM3 (ref 3.77–5.28)
RBC # UR STRIP: ABNORMAL /HPF
REF LAB TEST METHOD: ABNORMAL
SODIUM SERPL-SCNC: 137 MMOL/L (ref 136–145)
SP GR UR STRIP: 1.02 (ref 1–1.03)
SQUAMOUS #/AREA URNS HPF: ABNORMAL /HPF
UROBILINOGEN UR QL STRIP: ABNORMAL
WBC # UR STRIP: ABNORMAL /HPF
WBC NRBC COR # BLD: 5.11 10*3/MM3 (ref 3.4–10.8)

## 2023-08-21 PROCEDURE — 83690 ASSAY OF LIPASE: CPT | Performed by: NURSE PRACTITIONER

## 2023-08-21 PROCEDURE — 80307 DRUG TEST PRSMV CHEM ANLYZR: CPT | Performed by: NURSE PRACTITIONER

## 2023-08-21 PROCEDURE — 85025 COMPLETE CBC W/AUTO DIFF WBC: CPT | Performed by: NURSE PRACTITIONER

## 2023-08-21 PROCEDURE — 86677 HELICOBACTER PYLORI ANTIBODY: CPT | Performed by: NURSE PRACTITIONER

## 2023-08-21 PROCEDURE — 80053 COMPREHEN METABOLIC PANEL: CPT | Performed by: NURSE PRACTITIONER

## 2023-08-21 PROCEDURE — 1159F MED LIST DOCD IN RCRD: CPT | Performed by: NURSE PRACTITIONER

## 2023-08-21 PROCEDURE — 99214 OFFICE O/P EST MOD 30 MIN: CPT | Performed by: NURSE PRACTITIONER

## 2023-08-21 PROCEDURE — 1160F RVW MEDS BY RX/DR IN RCRD: CPT | Performed by: NURSE PRACTITIONER

## 2023-08-21 PROCEDURE — 81001 URINALYSIS AUTO W/SCOPE: CPT | Performed by: NURSE PRACTITIONER

## 2023-08-21 RX ORDER — PANTOPRAZOLE SODIUM 20 MG/1
20 TABLET, DELAYED RELEASE ORAL DAILY
Qty: 90 TABLET | Refills: 1 | Status: SHIPPED | OUTPATIENT
Start: 2023-08-21

## 2023-08-21 RX ORDER — ACETAMINOPHEN 500 MG
1 TABLET ORAL 2 TIMES DAILY
Qty: 60 CAPSULE | Refills: 1 | Status: SHIPPED | OUTPATIENT
Start: 2023-08-21

## 2023-08-21 RX ORDER — SUCRALFATE 1 G/1
1 TABLET ORAL
Qty: 120 TABLET | Refills: 1 | Status: SHIPPED | OUTPATIENT
Start: 2023-08-21 | End: 2023-10-20

## 2023-08-21 NOTE — PROGRESS NOTES
"Chief Complaint  Abdominal Pain (Pt reports, \"very sharp stabbing\" pain, across stomach) and Handicap Placard    Subjective        Medical History: has a past medical history of Arthritis, DDD (degenerative disc disease), lumbar, Diabetes, GERD (gastroesophageal reflux disease), Hyperlipidemia, Palsy, Poor circulation, Sleep apnea, and Stress incontinence.     Surgical History: has a past surgical history that includes Lumbar fusion (2013); Cholecystectomy (1998); Hysterectomy; Knee surgery; Knee surgery; Total hip arthroplasty (Right, 05/06/2019); and Cataract extraction (Bilateral).     Family History: family history includes Cancer in her brother; Diabetes in her father and sister; Heart disease in her father and mother.     Social History: reports that she has never smoked. She has never used smokeless tobacco. She reports that she does not drink alcohol and does not use drugs.    Maureen Hernandez presents to [facility]  Abdominal Pain  This is a recurrent problem. Episode onset: this episode 2 months. The onset quality is gradual. The problem occurs intermittently. The problem has been waxing and waning. The pain is located in the epigastric region and RUQ. The pain is at a severity of 8/10. The pain is moderate. The quality of the pain is dull and sharp. The abdominal pain does not radiate. Pertinent negatives include no constipation, flatus or headaches. Exacerbated by: not eating. Relieved by: eating food makes it better. Treatments tried: change in diet. EGD   Patient's had personal medical history ulcerative gastritis  Objective   Vital Signs:   BP 90/50 (BP Location: Left arm, Patient Position: Sitting, Cuff Size: Adult)   Pulse 89   Temp 97.9 øF (36.6 øC) (Temporal)   Ht 160 cm (63\")   Wt 54.4 kg (120 lb)   SpO2 97%   BMI 21.26 kg/mý       Wt Readings from Last 3 Encounters:   08/21/23 54.4 kg (120 lb)   06/05/23 57.2 kg (126 lb)   05/04/23 54.4 kg (120 lb)    "     BP Readings from Last 3 Encounters:   08/21/23 90/50   06/05/23 106/58   05/04/23 (!) 86/56        BMI is within normal parameters. No other follow-up for BMI required.       Physical Exam  Vitals reviewed.   Constitutional:       Appearance: Normal appearance. She is well-developed.   HENT:      Head: Normocephalic and atraumatic.   Eyes:      Conjunctiva/sclera: Conjunctivae normal.      Pupils: Pupils are equal, round, and reactive to light.   Cardiovascular:      Rate and Rhythm: Normal rate and regular rhythm.      Heart sounds: No murmur heard.  Pulmonary:      Effort: Pulmonary effort is normal.      Breath sounds: Normal breath sounds. No wheezing or rhonchi.   Abdominal:      General: Abdomen is flat. Bowel sounds are normal.      Palpations: Abdomen is soft.      Tenderness: There is abdominal tenderness (right upper quad). There is no guarding or rebound.   Skin:     General: Skin is warm and dry.   Neurological:      Mental Status: She is alert and oriented to person, place, and time.   Psychiatric:         Mood and Affect: Mood and affect normal.         Behavior: Behavior normal.         Thought Content: Thought content normal.         Judgment: Judgment normal.      Result Review :  {The following data was reviewed by OLIVIA Ross on 08/21/2023.  Common labs          3/27/2023    10:31   Common Labs   Glucose 261    BUN 18    Creatinine 0.69    Sodium 130    Potassium 4.1    Chloride 90    Calcium 9.9    Albumin 4.2    Total Bilirubin 0.3    Alkaline Phosphatase 68    AST (SGOT) 41    ALT (SGPT) 57    WBC 7.23    Hemoglobin 14.6    Hematocrit 42.0    Platelets 315    Total Cholesterol 112    Triglycerides 146    HDL Cholesterol 39    LDL Cholesterol  48    Hemoglobin A1C 6.30    Microalbumin, Urine 1.4                  Current Outpatient Medications on File Prior to Visit   Medication Sig Dispense Refill    aspirin 325 MG EC tablet TAKE 1 TABLET BY MOUTH TWICE DAILY 60 tablet 1     atorvastatin (LIPITOR) 40 MG tablet TAKE 1 TABLET BY MOUTH ONCE DAILY 90 tablet 1    gabapentin (NEURONTIN) 300 MG capsule TAKE 1 TO 2 CAPSULES BY MOUTH EVERY NIGHT 60 capsule 1    Lantus SoloStar 100 UNIT/ML injection pen Inject 15 Units under the skin into the appropriate area as directed Every Morning. 15 mL 1    lidocaine (LIDODERM) 5 % Place 1 patch on the skin as directed by provider Every 12 (Twelve) Hours. 30 patch 2    lisinopril (PRINIVIL,ZESTRIL) 5 MG tablet TAKE 1 TABLET BY MOUTH ONCE DAILY 90 tablet 1    loperamide (IMODIUM) 2 MG capsule TAKE 1 CAPSULE BY MOUTH FOUR TIMES DAILY AS NEEDED FOR DIARRHEA 90 capsule 3    magnesium oxide (MAG-OX) 400 MG tablet Take 1 tablet by mouth Every Night. 90 tablet 0    metFORMIN (GLUCOPHAGE) 1000 MG tablet Take 1 tablet by mouth 2 (Two) Times a Day With Meals. TAKES 2 TABS TWICE PER  tablet 1    ondansetron (ZOFRAN) 4 MG tablet TAKE 1 TABLET BY MOUTH EVERY 8 HOURS AS NEEDED FOR NAUSEA OR FOR VOMITING 90 tablet 0    primidone (MYSOLINE) 50 MG tablet TAKE 1 TABLET BY MOUTH THREE TIMES DAILY 90 tablet 1    traMADol (ULTRAM) 50 MG tablet TAKE 1 TABLET BY MOUTH EVERY 6 HOURS AS NEEDED FOR MODERATE PAIN or severe pain 60 tablet 1     Current Facility-Administered Medications on File Prior to Visit   Medication Dose Route Frequency Provider Last Rate Last Admin    mupirocin (BACTROBAN) 2 % nasal ointment   Nasal BID Estevan Sharma MD            Assessment and Plan  Diagnoses and all orders for this visit:    1. Upper abdominal pain (Primary)  -     Comprehensive Metabolic Panel  -     CBC & Differential  -     Lipase  -     Urinalysis With Culture If Indicated -    2. Gastroesophageal reflux disease, unspecified whether esophagitis present  Comments:  Start on Carafate before meals and at bedtime along with Protonix and a probiotic and a 6-week follow-up patient verbalized understanding agreeable tx plan  Orders:  -     Comprehensive Metabolic Panel  -     CBC &  Differential  -     Lipase    3. Hx of ulcer disease  Comments:  We will check for H. pylori, start on PPI and Carafate along with probiotic 6-week follow-up discussed if no improvement will refer for EGD  Orders:  -     Helicobacter Pylori, IgA IgG IgM    Other orders  -     sucralfate (Carafate) 1 g tablet; Take 1 tablet by mouth 4 (Four) Times a Day After Meals & at Bedtime for 60 days.  Dispense: 120 tablet; Refill: 1  -     pantoprazole (Protonix) 20 MG EC tablet; Take 1 tablet by mouth Daily.  Dispense: 90 tablet; Refill: 1  -     Probiotic, Lactobacillus, capsule; Take 1 capsule by mouth 2 (Two) Times a Day.  Dispense: 60 capsule; Refill: 1        Follow Up   Return in about 6 weeks (around 10/2/2023) for Recheck.  Patient was given instructions and counseling regarding her condition or for health maintenance advice. Please see specific information pulled into the AVS if appropriate.       Part of this note may be electronic transcription/translation of spoken language to printed text using the Dragon dictation system

## 2023-08-22 LAB
H PYLORI IGA SER-ACNC: <9 UNITS (ref 0–8.9)
H PYLORI IGG SER IA-ACNC: 0.25 INDEX VALUE (ref 0–0.79)
H PYLORI IGM SER-ACNC: <9 UNITS (ref 0–8.9)

## 2023-09-28 ENCOUNTER — TELEPHONE (OUTPATIENT)
Dept: FAMILY MEDICINE CLINIC | Facility: CLINIC | Age: 78
End: 2023-09-28
Payer: MEDICARE

## 2023-09-28 NOTE — TELEPHONE ENCOUNTER
Incoming transfer call:    Spoke to patient to inquire on patient's medication concern and symptoms  Patient voiced that she was taking Rx - metFORMIN (GLUCOPHAGE) 1000 MG tablet (07/10/2023) , 2 tabs BID and experiencing low blood glucose read in low 70s, dizziness, imbalance, weight loss, and trouble with vision.    Informed PCP and was given guidance to inform patient to reduce Rx - metFORMIN (GLUCOPHAGE) 1000 MG tablet (07/10/2023) to 1 tab BID and if symptoms continue reduce to 1 tab daily.    Pt verbalized understanding. No further questions/concerns at this time.    Patient scheduled with PCP on Monday, 10/02/2023.

## 2023-10-02 ENCOUNTER — OFFICE VISIT (OUTPATIENT)
Dept: FAMILY MEDICINE CLINIC | Facility: CLINIC | Age: 78
End: 2023-10-02
Payer: MEDICARE

## 2023-10-02 VITALS
DIASTOLIC BLOOD PRESSURE: 74 MMHG | SYSTOLIC BLOOD PRESSURE: 148 MMHG | WEIGHT: 118 LBS | HEART RATE: 97 BPM | OXYGEN SATURATION: 98 % | TEMPERATURE: 97 F | BODY MASS INDEX: 20.91 KG/M2 | HEIGHT: 63 IN

## 2023-10-02 DIAGNOSIS — E78.2 MIXED HYPERLIPIDEMIA: ICD-10-CM

## 2023-10-02 DIAGNOSIS — I10 ESSENTIAL HYPERTENSION: ICD-10-CM

## 2023-10-02 DIAGNOSIS — E11.9 TYPE 2 DIABETES MELLITUS WITHOUT COMPLICATION, WITHOUT LONG-TERM CURRENT USE OF INSULIN: ICD-10-CM

## 2023-10-02 DIAGNOSIS — Z09 FOLLOW-UP EXAM, 3-6 MONTHS SINCE PREVIOUS EXAM: Primary | ICD-10-CM

## 2023-10-02 DIAGNOSIS — G25.0 ESSENTIAL TREMOR: ICD-10-CM

## 2023-10-02 LAB
ALBUMIN SERPL-MCNC: 4.1 G/DL (ref 3.5–5.2)
ALBUMIN/GLOB SERPL: 1.5 G/DL
ALP SERPL-CCNC: 52 U/L (ref 39–117)
ALT SERPL W P-5'-P-CCNC: 31 U/L (ref 1–33)
ANION GAP SERPL CALCULATED.3IONS-SCNC: 11.2 MMOL/L (ref 5–15)
AST SERPL-CCNC: 36 U/L (ref 1–32)
BASOPHILS # BLD AUTO: 0.04 10*3/MM3 (ref 0–0.2)
BASOPHILS NFR BLD AUTO: 0.6 % (ref 0–1.5)
BILIRUB SERPL-MCNC: 0.3 MG/DL (ref 0–1.2)
BUN SERPL-MCNC: 18 MG/DL (ref 8–23)
BUN/CREAT SERPL: 18.8 (ref 7–25)
CALCIUM SPEC-SCNC: 10.9 MG/DL (ref 8.6–10.5)
CHLORIDE SERPL-SCNC: 95 MMOL/L (ref 98–107)
CHOLEST SERPL-MCNC: 129 MG/DL (ref 0–200)
CO2 SERPL-SCNC: 26.8 MMOL/L (ref 22–29)
CREAT SERPL-MCNC: 0.96 MG/DL (ref 0.57–1)
DEPRECATED RDW RBC AUTO: 42.5 FL (ref 37–54)
EGFRCR SERPLBLD CKD-EPI 2021: 61.4 ML/MIN/1.73
EOSINOPHIL # BLD AUTO: 0.22 10*3/MM3 (ref 0–0.4)
EOSINOPHIL NFR BLD AUTO: 3.2 % (ref 0.3–6.2)
ERYTHROCYTE [DISTWIDTH] IN BLOOD BY AUTOMATED COUNT: 12 % (ref 12.3–15.4)
GLOBULIN UR ELPH-MCNC: 2.7 GM/DL
GLUCOSE SERPL-MCNC: 112 MG/DL (ref 65–99)
HBA1C MFR BLD: 5.8 % (ref 4.8–5.6)
HCT VFR BLD AUTO: 38.3 % (ref 34–46.6)
HDLC SERPL-MCNC: 48 MG/DL (ref 40–60)
HGB BLD-MCNC: 13.2 G/DL (ref 12–15.9)
IMM GRANULOCYTES # BLD AUTO: 0.02 10*3/MM3 (ref 0–0.05)
IMM GRANULOCYTES NFR BLD AUTO: 0.3 % (ref 0–0.5)
LDLC SERPL CALC-MCNC: 60 MG/DL (ref 0–100)
LDLC/HDLC SERPL: 1.19 {RATIO}
LYMPHOCYTES # BLD AUTO: 2.06 10*3/MM3 (ref 0.7–3.1)
LYMPHOCYTES NFR BLD AUTO: 30.3 % (ref 19.6–45.3)
MCH RBC QN AUTO: 33.2 PG (ref 26.6–33)
MCHC RBC AUTO-ENTMCNC: 34.5 G/DL (ref 31.5–35.7)
MCV RBC AUTO: 96.2 FL (ref 79–97)
MONOCYTES # BLD AUTO: 0.46 10*3/MM3 (ref 0.1–0.9)
MONOCYTES NFR BLD AUTO: 6.8 % (ref 5–12)
NEUTROPHILS NFR BLD AUTO: 3.99 10*3/MM3 (ref 1.7–7)
NEUTROPHILS NFR BLD AUTO: 58.8 % (ref 42.7–76)
NRBC BLD AUTO-RTO: 0 /100 WBC (ref 0–0.2)
PLATELET # BLD AUTO: 269 10*3/MM3 (ref 140–450)
PMV BLD AUTO: 10.7 FL (ref 6–12)
POTASSIUM SERPL-SCNC: 4.3 MMOL/L (ref 3.5–5.2)
PROT SERPL-MCNC: 6.8 G/DL (ref 6–8.5)
RBC # BLD AUTO: 3.98 10*6/MM3 (ref 3.77–5.28)
SODIUM SERPL-SCNC: 133 MMOL/L (ref 136–145)
TRIGL SERPL-MCNC: 120 MG/DL (ref 0–150)
VLDLC SERPL-MCNC: 21 MG/DL (ref 5–40)
WBC NRBC COR # BLD: 6.79 10*3/MM3 (ref 3.4–10.8)

## 2023-10-02 PROCEDURE — 83036 HEMOGLOBIN GLYCOSYLATED A1C: CPT | Performed by: NURSE PRACTITIONER

## 2023-10-02 PROCEDURE — 85025 COMPLETE CBC W/AUTO DIFF WBC: CPT | Performed by: NURSE PRACTITIONER

## 2023-10-02 PROCEDURE — 80053 COMPREHEN METABOLIC PANEL: CPT | Performed by: NURSE PRACTITIONER

## 2023-10-02 PROCEDURE — 80061 LIPID PANEL: CPT | Performed by: NURSE PRACTITIONER

## 2023-10-02 RX ORDER — PRIMIDONE 50 MG/1
75 TABLET ORAL 3 TIMES DAILY
Qty: 135 TABLET | Refills: 1 | Status: SHIPPED | OUTPATIENT
Start: 2023-10-02

## 2023-10-02 NOTE — PROGRESS NOTES
Chief Complaint  Diabetes (T2DM, Rx - metFORMIN (GLUCOPHAGE) 1000 MG tablet follow up) and Fatigue (Pt reports wobbily, unsure if she had Covid-19, never tested)    Subjective        Medical History: has a past medical history of Arthritis, DDD (degenerative disc disease), lumbar, Diabetes, GERD (gastroesophageal reflux disease), Hyperlipidemia, Palsy, Poor circulation, Sleep apnea, and Stress incontinence.     Surgical History: has a past surgical history that includes Lumbar fusion (2013); Cholecystectomy (1998); Hysterectomy; Knee surgery; Knee surgery; Total hip arthroplasty (Right, 05/06/2019); and Cataract extraction (Bilateral).     Family History: family history includes Cancer in her brother; Diabetes in her father and sister; Heart disease in her father and mother.     Social History: reports that she has never smoked. She has never used smokeless tobacco. She reports that she does not drink alcohol and does not use drugs.    Maureen Hernandez presents to [facility]  Diabetes  She presents for her follow-up diabetic visit. She has type 2 diabetes mellitus. Her disease course has been stable. Hypoglycemia symptoms include sweats. Pertinent negatives for hypoglycemia include no sleepiness. There are no diabetic associated symptoms. There are no hypoglycemic complications. (Decreased her metformin, no more hypoglycemic incidents) Symptoms are stable. Risk factors for coronary artery disease include diabetes mellitus, dyslipidemia, hypertension and post-menopausal. Current diabetic treatment includes oral agent (monotherapy). She is compliant with treatment all of the time. She is following a generally healthy diet. She has not had a previous visit with a dietitian. An ACE inhibitor/angiotensin II receptor blocker is being taken. She does not see a podiatrist.Eye exam is current.   Shaking  This is a chronic problem. The current episode started more than 1 year ago. The problem  "occurs constantly. The problem has been waxing and waning. Exacerbated by: Fine motor skills. Treatments tried: Currently on primidone for essential tremors. The treatment provided mild relief.   Hypertension  This is a chronic problem. The current episode started more than 1 year ago. The problem is controlled. Associated symptoms include sweats. Pertinent negatives include no anxiety, peripheral edema or shortness of breath. Risk factors for coronary artery disease include dyslipidemia, post-menopausal state and diabetes mellitus. Current antihypertension treatment includes ACE inhibitors. There are no compliance problems.    Hyperlipidemia  This is a chronic problem. The current episode started more than 1 year ago. The problem is controlled. Recent lipid tests were reviewed and are normal. Exacerbating diseases include diabetes. Pertinent negatives include no shortness of breath. Current antihyperlipidemic treatment includes statins and herbal therapy. The current treatment provides significant improvement of lipids. There are no compliance problems.  Risk factors for coronary artery disease include diabetes mellitus, dyslipidemia, hypertension and post-menopausal.     Objective   Vital Signs:   /74 (BP Location: Left arm, Patient Position: Sitting, Cuff Size: Adult)   Pulse 97   Temp 97 °F (36.1 °C) (Temporal)   Ht 160 cm (63\")   Wt 53.5 kg (118 lb)   SpO2 98%   BMI 20.90 kg/m²       Wt Readings from Last 3 Encounters:   10/02/23 53.5 kg (118 lb)   08/21/23 54.4 kg (120 lb)   06/05/23 57.2 kg (126 lb)        BP Readings from Last 3 Encounters:   10/02/23 148/74   08/21/23 90/50   06/05/23 106/58        BMI is within normal parameters. No other follow-up for BMI required.       Physical Exam  Vitals reviewed.   Constitutional:       Appearance: Normal appearance. She is well-developed.   HENT:      Head: Normocephalic and atraumatic.   Eyes:      Conjunctiva/sclera: Conjunctivae normal.      Pupils: " Pupils are equal, round, and reactive to light.   Cardiovascular:      Rate and Rhythm: Normal rate and regular rhythm.      Heart sounds: No murmur heard.  Pulmonary:      Effort: Pulmonary effort is normal.      Breath sounds: Normal breath sounds. No wheezing or rhonchi.   Skin:     General: Skin is warm and dry.   Neurological:      Mental Status: She is alert and oriented to person, place, and time.   Psychiatric:         Mood and Affect: Mood and affect normal.         Behavior: Behavior normal.         Thought Content: Thought content normal.         Judgment: Judgment normal.      Result Review :  {The following data was reviewed by OLIVIA Ross on 10/02/2023.  Common labs          3/27/2023    10:31 8/21/2023    10:56 10/2/2023    16:43   Common Labs   Glucose 261  80  112    BUN 18  25  18    Creatinine 0.69  0.81  0.96    Sodium 130  137  133    Potassium 4.1  4.6  4.3    Chloride 90  97  95    Calcium 9.9  10.3  10.9    Albumin 4.2  4.5  4.1    Total Bilirubin 0.3  <0.2  0.3    Alkaline Phosphatase 68  50  52    AST (SGOT) 41  40  36    ALT (SGPT) 57  41  31    WBC 7.23  5.11  6.79    Hemoglobin 14.6  13.4  13.2    Hematocrit 42.0  38.9  38.3    Platelets 315  337  269    Total Cholesterol 112   129    Triglycerides 146   120    HDL Cholesterol 39   48    LDL Cholesterol  48   60    Hemoglobin A1C 6.30   5.80    Microalbumin, Urine 1.4        Data reviewed : Previous office note             Current Outpatient Medications on File Prior to Visit   Medication Sig Dispense Refill    aspirin 325 MG EC tablet TAKE 1 TABLET BY MOUTH TWICE DAILY 60 tablet 1    atorvastatin (LIPITOR) 40 MG tablet TAKE 1 TABLET BY MOUTH ONCE DAILY 90 tablet 1    gabapentin (NEURONTIN) 300 MG capsule TAKE 1 TO 2 CAPSULES BY MOUTH EVERY NIGHT 60 capsule 1    Lantus SoloStar 100 UNIT/ML injection pen Inject 15 Units under the skin into the appropriate area as directed Every Morning. 15 mL 1    lidocaine (LIDODERM) 5 %  Place 1 patch on the skin as directed by provider Every 12 (Twelve) Hours. 30 patch 2    lisinopril (PRINIVIL,ZESTRIL) 5 MG tablet TAKE 1 TABLET BY MOUTH ONCE DAILY 90 tablet 1    loperamide (IMODIUM) 2 MG capsule TAKE 1 CAPSULE BY MOUTH FOUR TIMES DAILY AS NEEDED FOR DIARRHEA 90 capsule 3    magnesium oxide (MAG-OX) 400 MG tablet Take 1 tablet by mouth Every Night. 90 tablet 0    metFORMIN (GLUCOPHAGE) 1000 MG tablet Take 1 tablet by mouth 2 (Two) Times a Day With Meals. TAKES 2 TABS TWICE PER DAY (Patient taking differently: Take 1 tablet by mouth 2 (Two) Times a Day With Meals.) 180 tablet 1    ondansetron (ZOFRAN) 4 MG tablet TAKE 1 TABLET BY MOUTH EVERY 8 HOURS AS NEEDED FOR NAUSEA OR FOR VOMITING 90 tablet 0    pantoprazole (Protonix) 20 MG EC tablet Take 1 tablet by mouth Daily. 90 tablet 1    Probiotic, Lactobacillus, capsule Take 1 capsule by mouth 2 (Two) Times a Day. 60 capsule 1    sucralfate (Carafate) 1 g tablet Take 1 tablet by mouth 4 (Four) Times a Day After Meals & at Bedtime for 60 days. 120 tablet 1    traMADol (ULTRAM) 50 MG tablet TAKE 1 TABLET BY MOUTH EVERY 6 HOURS AS NEEDED FOR MODERATE PAIN or severe pain 60 tablet 1     Current Facility-Administered Medications on File Prior to Visit   Medication Dose Route Frequency Provider Last Rate Last Admin    mupirocin (BACTROBAN) 2 % nasal ointment   Nasal BID Estevan Sharma MD            Assessment and Plan  Diagnoses and all orders for this visit:    1. Follow-up exam, 3-6 months since previous exam (Primary)    2. Type 2 diabetes mellitus without complication, without long-term current use of insulin  Comments:  Doing better on metformin at a lower dose, will continue to monitor patient is agreeable  Orders:  -     CBC Auto Differential  -     Comprehensive Metabolic Panel  -     Hemoglobin A1c  -     Lipid Panel    3. Essential tremor  Comments:  We will increase primidone with a 1 month follow-up to see how patient is doing, patient is  agreeable treatment plan    4. Mixed hyperlipidemia  Comments:  We will recheck labs, adjust medication if needed  Orders:  -     CBC Auto Differential  -     Comprehensive Metabolic Panel  -     Lipid Panel    5. Essential hypertension  Comments:  Blood pressure stable, slightly elevated today discussed with patient to keep an eye on it at home patient verbalized understanding  Orders:  -     CBC Auto Differential  -     Comprehensive Metabolic Panel    Other orders  -     primidone (MYSOLINE) 50 MG tablet; Take 1.5 tablets by mouth 3 (Three) Times a Day.  Dispense: 135 tablet; Refill: 1        Follow Up   Return in about 1 month (around 11/2/2023) for Recheck.  Patient was given instructions and counseling regarding her condition or for health maintenance advice. Please see specific information pulled into the AVS if appropriate.       Part of this note may be electronic transcription/translation of spoken language to printed text using the Dragon dictation system

## 2023-10-04 DIAGNOSIS — E83.52 HYPERCALCEMIA: Primary | ICD-10-CM

## 2023-10-04 DIAGNOSIS — E87.1 HYPONATREMIA: ICD-10-CM

## 2023-10-04 NOTE — PROGRESS NOTES
Called Maureen Hernandez at [phone] to relay results, redraw lab guidance, however was unable to reach patient. LVM informing patient and asking to please return call if any further questions/concerns.

## 2023-10-16 DIAGNOSIS — G89.29 CHRONIC MIDLINE LOW BACK PAIN WITHOUT SCIATICA: ICD-10-CM

## 2023-10-16 DIAGNOSIS — M54.50 CHRONIC MIDLINE LOW BACK PAIN WITHOUT SCIATICA: ICD-10-CM

## 2023-10-16 NOTE — TELEPHONE ENCOUNTER
Caller: Maureen Hernandez    Relationship: Self    Best call back number:     [phone]     Requested Prescriptions:   Requested Prescriptions     Pending Prescriptions Disp Refills    primidone (MYSOLINE) 50 MG tablet 135 tablet 1     Sig: Take 1.5 tablets by mouth 3 (Three) Times a Day.    traMADol (ULTRAM) 50 MG tablet 60 tablet 1      Pharmacy where request should be sent: [address] [phone] PH - [phone]      Last office visit with prescribing clinician: 10/2/2023   Last telemedicine visit with prescribing clinician: Visit date not found   Next office visit with prescribing clinician: 10/23/2023     Additional details provided by patient: PATIENT STATES THAT SHE WOULD LIKE HER primidone (MYSOLINE) 50 MG tablet INCREASED TO TWO PILLS A DAY IF POSSIBLE.   PATIENT STATES THAT SHE IS OUT OF PRIMIDONE AT THIS TIME.    Does the patient have less than a 3 day supply:  [x] Yes  [] No    Would you like a call back once the refill request has been completed: [] Yes [] No    If the office needs to give you a call back, can they leave a voicemail: [] Yes [] No    Anthony Li Rep   10/16/23 08:41 EDT

## 2023-10-17 ENCOUNTER — TELEPHONE (OUTPATIENT)
Dept: FAMILY MEDICINE CLINIC | Facility: CLINIC | Age: 78
End: 2023-10-17

## 2023-10-17 ENCOUNTER — CLINICAL SUPPORT (OUTPATIENT)
Dept: FAMILY MEDICINE CLINIC | Facility: CLINIC | Age: 78
End: 2023-10-17
Payer: MEDICARE

## 2023-10-17 DIAGNOSIS — E87.1 HYPONATREMIA: ICD-10-CM

## 2023-10-17 DIAGNOSIS — E83.52 HYPERCALCEMIA: Primary | ICD-10-CM

## 2023-10-17 NOTE — TELEPHONE ENCOUNTER
PT CAME IN TODAY NEEDING A REFILL ON HER PRIMIDONE. SHE IS WANTING TO KNOW IF SHE CAN HAVE IT 2 TABLETS INSTEAD OF 1.5 AND ALSO WANTS TO KNOW IF SHE CAN HAVE A 2 MONTH SUPPLY, SHE WILL BE OUT OF TOWN FOR 1 MONTH. PLEASE CALL PT FOR QUESTIONS OR WHEN COMPLETE. SHE IS COMPLETELY OUT. THANK YOU

## 2023-10-17 NOTE — TELEPHONE ENCOUNTER
Patient advised this will need to be discussed with PCP at her one month follow up on 10/23/23 at 1:15 pm patient voiced understanding and stated she would discuss with pcp at appt.

## 2023-10-18 RX ORDER — PRIMIDONE 50 MG/1
75 TABLET ORAL 3 TIMES DAILY
Qty: 135 TABLET | Refills: 1 | Status: SHIPPED | OUTPATIENT
Start: 2023-10-18 | End: 2023-10-23 | Stop reason: SDUPTHER

## 2023-10-18 RX ORDER — TRAMADOL HYDROCHLORIDE 50 MG/1
50 TABLET ORAL EVERY 8 HOURS PRN
Qty: 60 TABLET | Refills: 1 | Status: SHIPPED | OUTPATIENT
Start: 2023-10-18

## 2023-10-23 ENCOUNTER — OFFICE VISIT (OUTPATIENT)
Dept: FAMILY MEDICINE CLINIC | Facility: CLINIC | Age: 78
End: 2023-10-23
Payer: MEDICARE

## 2023-10-23 VITALS
WEIGHT: 120 LBS | OXYGEN SATURATION: 98 % | HEART RATE: 75 BPM | DIASTOLIC BLOOD PRESSURE: 74 MMHG | BODY MASS INDEX: 21.26 KG/M2 | SYSTOLIC BLOOD PRESSURE: 130 MMHG | TEMPERATURE: 97.7 F | HEIGHT: 63 IN

## 2023-10-23 DIAGNOSIS — R26.89 DECREASED MOBILITY: ICD-10-CM

## 2023-10-23 DIAGNOSIS — Z09 FOLLOW-UP EXAM: Primary | ICD-10-CM

## 2023-10-23 DIAGNOSIS — G25.0 ESSENTIAL TREMOR: ICD-10-CM

## 2023-10-23 DIAGNOSIS — Z76.0 MEDICATION REFILL: ICD-10-CM

## 2023-10-23 RX ORDER — GABAPENTIN 300 MG/1
300 CAPSULE ORAL 2 TIMES DAILY
Qty: 120 CAPSULE | Refills: 0 | Status: SHIPPED | OUTPATIENT
Start: 2023-10-23

## 2023-10-23 RX ORDER — ATORVASTATIN CALCIUM 40 MG/1
40 TABLET, FILM COATED ORAL DAILY
Qty: 90 TABLET | Refills: 1 | Status: SHIPPED | OUTPATIENT
Start: 2023-10-23

## 2023-10-23 RX ORDER — PRIMIDONE 50 MG/1
100 TABLET ORAL 3 TIMES DAILY
Qty: 540 TABLET | Refills: 1 | Status: SHIPPED | OUTPATIENT
Start: 2023-10-23

## 2023-10-23 RX ORDER — LISINOPRIL 5 MG/1
5 TABLET ORAL DAILY
Qty: 90 TABLET | Refills: 1 | Status: SHIPPED | OUTPATIENT
Start: 2023-10-23

## 2023-10-23 RX ORDER — INSULIN GLARGINE 100 [IU]/ML
15 INJECTION, SOLUTION SUBCUTANEOUS EVERY MORNING
Start: 2023-10-23

## 2023-10-23 RX ORDER — PANTOPRAZOLE SODIUM 20 MG/1
20 TABLET, DELAYED RELEASE ORAL DAILY
Qty: 90 TABLET | Refills: 1 | Status: SHIPPED | OUTPATIENT
Start: 2023-10-23

## 2023-10-23 NOTE — PROGRESS NOTES
"Chief Complaint  Tremors (Following up on increase of primidone )    Subjective        Medical History: has a past medical history of Arthritis, DDD (degenerative disc disease), lumbar, Diabetes, GERD (gastroesophageal reflux disease), Hyperlipidemia, Palsy, Poor circulation, Sleep apnea, and Stress incontinence.     Surgical History: has a past surgical history that includes Lumbar fusion (2013); Cholecystectomy (1998); Hysterectomy; Knee surgery; Knee surgery; Total hip arthroplasty (Right, 05/06/2019); and Cataract extraction (Bilateral).     Family History: family history includes Cancer in her brother; Diabetes in her father and sister; Heart disease in her father and mother.     Social History: reports that she has never smoked. She has never used smokeless tobacco. She reports that she does not drink alcohol and does not use drugs.    Maureen Hernandez presents to [facility]  History of Present Illness  Shaking  This is a chronic problem. The current episode started more than 1 year ago. The problem occurs constantly. The problem has been waxing and waning. Exacerbated by: Fine motor skills. Treatments tried: Currently on primidone for essential tremors. The treatment provided mild relief.  Patient was seen about 3 weeks ago primidone was increased to 75 mg 3 times daily, patient comes in for reevaluation.  Patient would like to increase the primidone to 100 mg 3 times daily, she feels like she did get some improvement with the 75 mg, feels like it increased to 100 mg the symptoms will resolve completely.      Objective   Vital Signs:   /74   Pulse 75   Temp 97.7 °F (36.5 °C)   Ht 160 cm (63\")   Wt 54.4 kg (120 lb)   SpO2 98%   BMI 21.26 kg/m²       Wt Readings from Last 3 Encounters:   10/23/23 54.4 kg (120 lb)   10/02/23 53.5 kg (118 lb)   08/21/23 54.4 kg (120 lb)        BP Readings from Last 3 Encounters:   10/23/23 130/74   10/02/23 148/74   08/21/23 90/50    "     BMI is within normal parameters. No other follow-up for BMI required.       Physical Exam  Vitals reviewed.   Constitutional:       Appearance: Normal appearance. She is well-developed.   HENT:      Head: Normocephalic and atraumatic.   Eyes:      Conjunctiva/sclera: Conjunctivae normal.      Pupils: Pupils are equal, round, and reactive to light.   Cardiovascular:      Rate and Rhythm: Normal rate and regular rhythm.      Heart sounds: No murmur heard.  Pulmonary:      Effort: Pulmonary effort is normal.      Breath sounds: Normal breath sounds. No wheezing or rhonchi.   Skin:     General: Skin is warm and dry.   Neurological:      Mental Status: She is alert and oriented to person, place, and time.   Psychiatric:         Mood and Affect: Mood and affect normal.         Behavior: Behavior normal.         Thought Content: Thought content normal.         Judgment: Judgment normal.        Result Review :  {The following data was reviewed by OLIVIA Ross on 10/23/2023.  Common labs          3/27/2023    10:31 8/21/2023    10:56 10/2/2023    16:43   Common Labs   Glucose 261  80  112    BUN 18  25  18    Creatinine 0.69  0.81  0.96    Sodium 130  137  133    Potassium 4.1  4.6  4.3    Chloride 90  97  95    Calcium 9.9  10.3  10.9    Albumin 4.2  4.5  4.1    Total Bilirubin 0.3  <0.2  0.3    Alkaline Phosphatase 68  50  52    AST (SGOT) 41  40  36    ALT (SGPT) 57  41  31    WBC 7.23  5.11  6.79    Hemoglobin 14.6  13.4  13.2    Hematocrit 42.0  38.9  38.3    Platelets 315  337  269    Total Cholesterol 112   129    Triglycerides 146   120    HDL Cholesterol 39   48    LDL Cholesterol  48   60    Hemoglobin A1C 6.30   5.80    Microalbumin, Urine 1.4        Data reviewed : PREVIOUS OFFICE NOTES           Pain Management Panel  More data may exist         Latest Ref Rng & Units 8/21/2023 3/27/2023   Pain Management Panel   Amphetamine, Urine Qual Negative - Negative    Barbiturates Screen, Urine Negative  Positive  Positive    Benzodiazepine Screen, Urine Negative Negative  Negative    Buprenorphine, Screen, Urine Negative - Negative    Cocaine Screen, Urine Negative Negative  Negative    Fentanyl, Urine Negative Negative  -   Methadone Screen , Urine Negative Negative  Negative    Methamphetamine, Ur Negative - Negative       Risks and benefits of opioid medication such as tolerance, dependence, addiction, misuse, abuse, diversion, opioid use disorder, accidental overdose resulting in death, drug interactions, and other systemic side effects were discussed with the patient.    Current Outpatient Medications on File Prior to Visit   Medication Sig Dispense Refill    aspirin 325 MG EC tablet TAKE 1 TABLET BY MOUTH TWICE DAILY 60 tablet 1    lidocaine (LIDODERM) 5 % Place 1 patch on the skin as directed by provider Every 12 (Twelve) Hours. 30 patch 2    loperamide (IMODIUM) 2 MG capsule TAKE 1 CAPSULE BY MOUTH FOUR TIMES DAILY AS NEEDED FOR DIARRHEA 90 capsule 3    magnesium oxide (MAG-OX) 400 MG tablet Take 1 tablet by mouth Every Night. 90 tablet 0    ondansetron (ZOFRAN) 4 MG tablet TAKE 1 TABLET BY MOUTH EVERY 8 HOURS AS NEEDED FOR NAUSEA OR FOR VOMITING 90 tablet 0    Probiotic, Lactobacillus, capsule Take 1 capsule by mouth 2 (Two) Times a Day. 60 capsule 1    traMADol (ULTRAM) 50 MG tablet Take 1 tablet by mouth Every 8 (Eight) Hours As Needed for Moderate Pain. 60 tablet 1    [DISCONTINUED] atorvastatin (LIPITOR) 40 MG tablet TAKE 1 TABLET BY MOUTH ONCE DAILY 90 tablet 1    [DISCONTINUED] gabapentin (NEURONTIN) 300 MG capsule TAKE 1 TO 2 CAPSULES BY MOUTH EVERY NIGHT 60 capsule 1    [DISCONTINUED] Lantus SoloStar 100 UNIT/ML injection pen Inject 15 Units under the skin into the appropriate area as directed Every Morning. 15 mL 1    [DISCONTINUED] lisinopril (PRINIVIL,ZESTRIL) 5 MG tablet TAKE 1 TABLET BY MOUTH ONCE DAILY 90 tablet 1    [DISCONTINUED] metFORMIN (GLUCOPHAGE) 1000 MG tablet Take 1 tablet by mouth  2 (Two) Times a Day With Meals. TAKES 2 TABS TWICE PER DAY (Patient taking differently: Take 1 tablet by mouth 2 (Two) Times a Day With Meals.) 180 tablet 1    [DISCONTINUED] pantoprazole (Protonix) 20 MG EC tablet Take 1 tablet by mouth Daily. 90 tablet 1    [DISCONTINUED] primidone (MYSOLINE) 50 MG tablet Take 1.5 tablets by mouth 3 (Three) Times a Day. 135 tablet 1     Current Facility-Administered Medications on File Prior to Visit   Medication Dose Route Frequency Provider Last Rate Last Admin    mupirocin (BACTROBAN) 2 % nasal ointment   Nasal BID Estevan Sharma MD            Assessment and Plan  Diagnoses and all orders for this visit:    1. Follow-up exam (Primary)    2. Decreased mobility due to pain in back  Comments:  Pt is improving withPT, gabapentin and pain medication.  She looks much better today than she did 1 month ago. f/u in 3 months  Orders:  -     gabapentin (NEURONTIN) 300 MG capsule; Take 1 capsule by mouth 2 (Two) Times a Day.  Dispense: 120 capsule; Refill: 0    3. Essential tremor    4. Medication refill    Other orders  -     pantoprazole (Protonix) 20 MG EC tablet; Take 1 tablet by mouth Daily.  Dispense: 90 tablet; Refill: 1  -     atorvastatin (LIPITOR) 40 MG tablet; Take 1 tablet by mouth Daily.  Dispense: 90 tablet; Refill: 1  -     Lantus SoloStar 100 UNIT/ML injection pen; Inject 15 Units under the skin into the appropriate area as directed Every Morning.  -     metFORMIN (GLUCOPHAGE) 1000 MG tablet; Take 1 tablet by mouth 2 (Two) Times a Day With Meals. TAKES 2 TABS TWICE PER DAY  Dispense: 180 tablet; Refill: 1  -     lisinopril (PRINIVIL,ZESTRIL) 5 MG tablet; Take 1 tablet by mouth Daily.  Dispense: 90 tablet; Refill: 1  -     primidone (MYSOLINE) 50 MG tablet; Take 2 tablets by mouth 3 (Three) Times a Day.  Dispense: 540 tablet; Refill: 1  -     Fluzone High-Dose 65+yrs ([id number])        Follow Up   Return in about 5 months (around 3/23/2024) for Recheck.  Patient was given  instructions and counseling regarding her condition or for health maintenance advice. Please see specific information pulled into the AVS if appropriate.       Part of this note may be electronic transcription/translation of spoken language to printed text using the Dragon dictation system

## 2023-11-13 RX ORDER — MAGNESIUM OXIDE 400 MG/1
1 TABLET ORAL NIGHTLY
Qty: 90 TABLET | Refills: 0 | Status: SHIPPED | OUTPATIENT
Start: 2023-11-13

## 2023-11-13 RX ORDER — PRIMIDONE 50 MG/1
100 TABLET ORAL 3 TIMES DAILY
Qty: 540 TABLET | Refills: 1 | Status: SHIPPED | OUTPATIENT
Start: 2023-11-13

## 2023-11-13 NOTE — TELEPHONE ENCOUNTER
Caller: Maureen Hernandez    Relationship: Self    Best call back number: [phone]     Requested Prescriptions:   Requested Prescriptions     Pending Prescriptions Disp Refills    magnesium oxide (MAG-OX) 400 MG tablet 90 tablet 0     Sig: Take 1 tablet by mouth Every Night.    primidone (MYSOLINE) 50 MG tablet 540 tablet 1     Sig: Take 2 tablets by mouth 3 (Three) Times a Day.        Pharmacy where request should be sent: [address] [phone] [facility] [phone]      Last office visit with prescribing clinician: 10/23/2023   Last telemedicine visit with prescribing clinician: Visit date not found   Next office visit with prescribing clinician: 3/22/2024     Additional details provided by patient: PATIENT HAS 1 DAY LEFT OF MEDICATION    Does the patient have less than a 3 day supply:  [x] Yes  [] No      Anthony Javier Rep   11/13/23 11:45 EST

## 2024-02-13 DIAGNOSIS — R26.89 DECREASED MOBILITY: ICD-10-CM

## 2024-02-13 RX ORDER — INSULIN GLARGINE 100 [IU]/ML
15 INJECTION, SOLUTION SUBCUTANEOUS EVERY MORNING
Status: CANCELLED
Start: 2024-02-13

## 2024-02-13 RX ORDER — INSULIN GLARGINE 100 [IU]/ML
INJECTION, SOLUTION SUBCUTANEOUS
Qty: 15 ML | Refills: 1 | OUTPATIENT
Start: 2024-02-13

## 2024-02-13 RX ORDER — INSULIN GLARGINE 100 [IU]/ML
15 INJECTION, SOLUTION SUBCUTANEOUS EVERY MORNING
Qty: 15 ML | Refills: 3
Start: 2024-02-13 | End: 2024-02-15

## 2024-02-14 RX ORDER — GABAPENTIN 300 MG/1
300 CAPSULE ORAL 2 TIMES DAILY
Qty: 120 CAPSULE | Refills: 1 | Status: SHIPPED | OUTPATIENT
Start: 2024-02-14

## 2024-02-15 PROCEDURE — 87480 CANDIDA DNA DIR PROBE: CPT

## 2024-02-15 PROCEDURE — 87510 GARDNER VAG DNA DIR PROBE: CPT

## 2024-02-15 PROCEDURE — 87086 URINE CULTURE/COLONY COUNT: CPT

## 2024-02-15 PROCEDURE — 87660 TRICHOMONAS VAGIN DIR PROBE: CPT

## 2024-02-15 RX ORDER — INSULIN GLARGINE 100 [IU]/ML
INJECTION, SOLUTION SUBCUTANEOUS
Qty: 15 ML | Refills: 1 | Status: SHIPPED | OUTPATIENT
Start: 2024-02-15

## 2024-02-21 ENCOUNTER — TELEPHONE (OUTPATIENT)
Dept: FAMILY MEDICINE CLINIC | Facility: CLINIC | Age: 79
End: 2024-02-21
Payer: MEDICARE

## 2024-02-21 NOTE — TELEPHONE ENCOUNTER
Caller: Penelope Sharma    Relationship: Emergency Contact    Best call back number: [phone]     What was the call regarding: PENELOPE STATES SHE WOULD LIKE A CALL FROM PROVIDER WHITE BEFORE HER UPCOMING APPOINTMENT REGARDING THE PATIENTS MEMORY AND BALANCE.

## 2024-03-06 ENCOUNTER — OFFICE VISIT (OUTPATIENT)
Dept: FAMILY MEDICINE CLINIC | Facility: CLINIC | Age: 79
End: 2024-03-06
Payer: MEDICARE

## 2024-03-06 VITALS
HEART RATE: 64 BPM | BODY MASS INDEX: 23.79 KG/M2 | WEIGHT: 129.3 LBS | OXYGEN SATURATION: 98 % | HEIGHT: 62 IN | DIASTOLIC BLOOD PRESSURE: 76 MMHG | SYSTOLIC BLOOD PRESSURE: 124 MMHG | TEMPERATURE: 96.7 F

## 2024-03-06 DIAGNOSIS — N30.01 ACUTE CYSTITIS WITH HEMATURIA: ICD-10-CM

## 2024-03-06 DIAGNOSIS — E11.9 TYPE 2 DIABETES MELLITUS WITHOUT COMPLICATION, WITHOUT LONG-TERM CURRENT USE OF INSULIN: ICD-10-CM

## 2024-03-06 DIAGNOSIS — E87.1 HYPONATREMIA: ICD-10-CM

## 2024-03-06 DIAGNOSIS — E83.52 HYPERCALCEMIA: ICD-10-CM

## 2024-03-06 DIAGNOSIS — R41.3 MEMORY LOSS OF UNKNOWN CAUSE: Primary | ICD-10-CM

## 2024-03-06 DIAGNOSIS — Z91.81 AT HIGH RISK FOR FALLS: ICD-10-CM

## 2024-03-06 LAB
BILIRUB BLD-MCNC: NEGATIVE MG/DL
CLARITY, POC: ABNORMAL
COLOR UR: YELLOW
EXPIRATION DATE: ABNORMAL
GLUCOSE UR STRIP-MCNC: ABNORMAL MG/DL
KETONES UR QL: NEGATIVE
LEUKOCYTE EST, POC: ABNORMAL
Lab: ABNORMAL
NITRITE UR-MCNC: POSITIVE MG/ML
PH UR: 5.5 [PH] (ref 5–8)
PROT UR STRIP-MCNC: ABNORMAL MG/DL
RBC # UR STRIP: ABNORMAL /UL
SP GR UR: 1.02 (ref 1–1.03)
UROBILINOGEN UR QL: ABNORMAL

## 2024-03-06 PROCEDURE — 87086 URINE CULTURE/COLONY COUNT: CPT | Performed by: NURSE PRACTITIONER

## 2024-03-06 PROCEDURE — 87077 CULTURE AEROBIC IDENTIFY: CPT | Performed by: NURSE PRACTITIONER

## 2024-03-06 PROCEDURE — 80053 COMPREHEN METABOLIC PANEL: CPT | Performed by: NURSE PRACTITIONER

## 2024-03-06 PROCEDURE — 85025 COMPLETE CBC W/AUTO DIFF WBC: CPT | Performed by: NURSE PRACTITIONER

## 2024-03-06 PROCEDURE — 83036 HEMOGLOBIN GLYCOSYLATED A1C: CPT | Performed by: NURSE PRACTITIONER

## 2024-03-06 PROCEDURE — 87186 SC STD MICRODIL/AGAR DIL: CPT | Performed by: NURSE PRACTITIONER

## 2024-03-06 RX ORDER — INSULIN GLARGINE 100 [IU]/ML
45 INJECTION, SOLUTION SUBCUTANEOUS NIGHTLY
Qty: 15 ML | Refills: 1 | Status: SHIPPED | OUTPATIENT
Start: 2024-03-06

## 2024-03-06 RX ORDER — CIPROFLOXACIN 500 MG/1
500 TABLET, FILM COATED ORAL 2 TIMES DAILY
Qty: 14 TABLET | Refills: 0 | Status: SHIPPED | OUTPATIENT
Start: 2024-03-06

## 2024-03-06 RX ORDER — PRIMIDONE 50 MG/1
100 TABLET ORAL 3 TIMES DAILY
Qty: 540 TABLET | Refills: 1 | Status: SHIPPED | OUTPATIENT
Start: 2024-03-06

## 2024-03-06 RX ORDER — ASPIRIN 81 MG/1
81 TABLET ORAL DAILY
COMMUNITY

## 2024-03-06 RX ORDER — MAGNESIUM OXIDE 400 MG/1
1 TABLET ORAL NIGHTLY
Qty: 90 TABLET | Refills: 0 | Status: SHIPPED | OUTPATIENT
Start: 2024-03-06

## 2024-03-06 NOTE — PROGRESS NOTES
Chief Complaint  Memory Screening and Med Refill    Subjective        Medical History: has a past medical history of Arthritis, DDD (degenerative disc disease), lumbar, Diabetes, Fibromyalgia, GERD (gastroesophageal reflux disease), Hyperlipidemia, Palsy, Poor circulation, Sleep apnea, and Stress incontinence.     Surgical History: has a past surgical history that includes Lumbar fusion (2013); Cholecystectomy (1998); Hysterectomy; Knee surgery; Knee surgery; Total hip arthroplasty (Right, 05/06/2019); and Cataract extraction (Bilateral).     Family History: family history includes Cancer in her brother; Diabetes in her father and sister; Heart disease in her father and mother.     Social History: reports that she has never smoked. She has never used smokeless tobacco. She reports that she does not drink alcohol and does not use drugs.    Maureen Hernandez presents to [facility]  Memory Loss  This is a recurrent problem. The current episode started more than 1 year ago. The problem has been gradually worsening. Associated symptoms include a change in bowel habit. Pertinent negatives include no abdominal pain, congestion, coughing, fever, headaches, nausea, rash or vomiting. The symptoms are aggravated by stress. She has tried nothing for the symptoms. The treatment provided no relief.   Daughter (Ruth) at bedside, comes in with concern of mom's mental state. Progressively gotten worse over the last 3 months. Daughter states that she has had frequent episode of forgetting things, especially with short term immediate memory. She has one episode of  total bowel and  bladder incontinence while sleeping 2 weeks ago. Episodes of staring off in space and when you are able to get her attention, there is a change in comprehension and presence. Last 1-2 min at a time. States that over the last 3 weeks daily, repeating herself frequently. Patient is a high fall risk, last fell in Dec 2023 and  "had a recent close fall. 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 States that she has brain fog first thing in the morning. Overall patient states that she feels good and healthy, uses Rolator walker when she is going to be out of the house. She take walks for exercise when weather is nice, using Rolator walker.     Slums Exam in office 23/30 MNCD (Mild neurocognitive Disorder)     Level of education, Masters Degree in Education    Lives at home alone, (Ruth, Daughter) although daughter is looking to move in    Feels like depression occurs in the winter, better now that it is getting warmer    On Primidone for essential tremors, doing well on medication      Objective   Vital Signs:   /76 (BP Location: Right arm, Patient Position: Sitting, Cuff Size: Adult)   Pulse 64   Temp 96.7 °F (35.9 °C) (Infrared)   Ht 157.5 cm (62\")   Wt 58.7 kg (129 lb 4.8 oz)   SpO2 98%   BMI 23.65 kg/m²       Wt Readings from Last 3 Encounters:   03/06/24 58.7 kg (129 lb 4.8 oz)   02/15/24 57.2 kg (126 lb)   10/23/23 54.4 kg (120 lb)        BP Readings from Last 3 Encounters:   03/06/24 124/76   02/15/24 134/75   10/23/23 130/74        BMI is within normal parameters. No other follow-up for BMI required.     Physical Exam  Vitals reviewed.   Constitutional:       General: She is not in acute distress.     Appearance: Normal appearance. She is well-developed. She is not ill-appearing.   HENT:      Head: Normocephalic and atraumatic.      Right Ear: Tympanic membrane normal.      Left Ear: Tympanic membrane normal.   Eyes:      General:         Right eye: No discharge.         Left eye: No discharge.      Extraocular Movements: Extraocular movements intact.      Conjunctiva/sclera: Conjunctivae normal.      Pupils: Pupils are equal, round, and reactive to light.   Cardiovascular:      Rate and Rhythm: Normal rate and regular rhythm.      Heart sounds: No murmur heard.  Pulmonary:      Effort: " Pulmonary effort is normal.      Breath sounds: Normal breath sounds. No wheezing or rhonchi.   Skin:     General: Skin is warm and dry.   Neurological:      Mental Status: She is alert and oriented to person, place, and time. Mental status is at baseline.      Cranial Nerves: Cranial nerve deficit present.      Comments: Benign tremor   Psychiatric:         Mood and Affect: Mood and affect normal.         Behavior: Behavior normal.         Thought Content: Thought content normal.         Judgment: Judgment normal.        Result Review :  {The following data was reviewed by OLIVIA Ross on 03/06/2024.  Common labs          3/27/2023    10:31 8/21/2023    10:56 10/2/2023    16:43   Common Labs   Glucose 261  80  112    BUN 18  25  18    Creatinine 0.69  0.81  0.96    Sodium 130  137  133    Potassium 4.1  4.6  4.3    Chloride 90  97  95    Calcium 9.9  10.3  10.9    Albumin 4.2  4.5  4.1    Total Bilirubin 0.3  <0.2  0.3    Alkaline Phosphatase 68  50  52    AST (SGOT) 41  40  36    ALT (SGPT) 57  41  31    WBC 7.23  5.11  6.79    Hemoglobin 14.6  13.4  13.2    Hematocrit 42.0  38.9  38.3    Platelets 315  337  269    Total Cholesterol 112   129    Triglycerides 146   120    HDL Cholesterol 39   48    LDL Cholesterol  48   60    Hemoglobin A1C 6.30   5.80    Microalbumin, Urine 1.4        Data reviewed : Previous office note           Brief Urine Lab Results  (Last result in the past 365 days)        Color   Clarity   Blood   Leuk Est   Nitrite   Protein   CREAT   Urine HCG        03/06/24 0901 Yellow   Cloudy   Small   Moderate (2+)   Positive   Trace                  Current Outpatient Medications on File Prior to Visit   Medication Sig Dispense Refill    aspirin 81 MG EC tablet Take 1 tablet by mouth Daily.      atorvastatin (LIPITOR) 40 MG tablet Take 1 tablet by mouth Daily. 90 tablet 1    gabapentin (NEURONTIN) 300 MG capsule TAKE 1 CAPSULE BY MOUTH TWICE DAILY 120 capsule 1    lidocaine  (LIDODERM) 5 % Place 1 patch on the skin as directed by provider Every 12 (Twelve) Hours. 30 patch 2    lisinopril (PRINIVIL,ZESTRIL) 5 MG tablet Take 1 tablet by mouth Daily. 90 tablet 1    loperamide (IMODIUM) 2 MG capsule TAKE 1 CAPSULE BY MOUTH FOUR TIMES DAILY AS NEEDED FOR DIARRHEA 90 capsule 3    metFORMIN (GLUCOPHAGE) 1000 MG tablet Take 1 tablet by mouth 2 (Two) Times a Day With Meals. TAKES 2 TABS TWICE PER  tablet 1    ondansetron (ZOFRAN) 4 MG tablet TAKE 1 TABLET BY MOUTH EVERY 8 HOURS AS NEEDED FOR NAUSEA OR FOR VOMITING 90 tablet 0    Probiotic, Lactobacillus, capsule Take 1 capsule by mouth 2 (Two) Times a Day. 60 capsule 1    [DISCONTINUED] Lantus SoloStar 100 UNIT/ML injection pen INJECT 15 UNITS UNDER THE SKIN INTO THE APPROPRIATE AREA EVERY NIGHT AS DIRECTED 15 mL 1    [DISCONTINUED] magnesium oxide (MAG-OX) 400 MG tablet Take 1 tablet by mouth Every Night. 90 tablet 0    [DISCONTINUED] primidone (MYSOLINE) 50 MG tablet Take 2 tablets by mouth 3 (Three) Times a Day. (Patient taking differently: Take 2 tablets by mouth 2 (Two) Times a Day.) 540 tablet 1    pantoprazole (Protonix) 20 MG EC tablet Take 1 tablet by mouth Daily. (Patient not taking: Reported on 3/6/2024) 90 tablet 1    traMADol (ULTRAM) 50 MG tablet Take 1 tablet by mouth Every 8 (Eight) Hours As Needed for Moderate Pain. (Patient not taking: Reported on 3/6/2024) 60 tablet 1    [DISCONTINUED] aspirin 325 MG EC tablet TAKE 1 TABLET BY MOUTH TWICE DAILY (Patient not taking: Reported on 3/6/2024) 60 tablet 1    [DISCONTINUED] fluconazole (DIFLUCAN) 200 MG tablet Take 1 tablet by mouth Daily. (Patient not taking: Reported on 3/6/2024) 1 tablet 0     Current Facility-Administered Medications on File Prior to Visit   Medication Dose Route Frequency Provider Last Rate Last Admin    mupirocin (BACTROBAN) 2 % nasal ointment   Nasal BID Estevan Sharma MD            Assessment and Plan  Diagnoses and all orders for this visit:    1.  Memory loss of unknown cause (Primary)  -     POCT urinalysis dipstick, automated  -     Ambulatory Referral to Neurology  -     CBC Auto Differential  -     Cancel: Comprehensive Metabolic Panel  -     Hemoglobin A1c  -     MRI Brain Without Contrast; Future    2. Type 2 diabetes mellitus without complication, without long-term current use of insulin  -     CBC Auto Differential  -     Cancel: Comprehensive Metabolic Panel  -     Hemoglobin A1c    3. Acute cystitis with hematuria  -     Urine Culture - Urine, Urine, Clean Catch    4. At high risk for falls    5. Hypercalcemia  -     Comprehensive Metabolic Panel    6. Hyponatremia  -     Comprehensive Metabolic Panel    Other orders  -     Lantus SoloStar 100 UNIT/ML injection pen; Inject 45 Units under the skin into the appropriate area as directed Every Night.  Dispense: 15 mL; Refill: 1  -     magnesium oxide (MAG-OX) 400 MG tablet; Take 1 tablet by mouth Every Night.  Dispense: 90 tablet; Refill: 0  -     primidone (MYSOLINE) 50 MG tablet; Take 2 tablets by mouth 3 (Three) Times a Day.  Dispense: 540 tablet; Refill: 1  -     ciprofloxacin (Cipro) 500 MG tablet; Take 1 tablet by mouth 2 (Two) Times a Day.  Dispense: 14 tablet; Refill: 0    Will refer over to neurology Dr. Riley for further workup and management of the memory loss.  Will do an MRI of brain, to rule out any acute intracranial abnormality such as stroke.  Urine did show UTI will treat, and send urine off for culture that could be the cause of some of the worsening mental status changes.  There are family history of seizures and family is concerned that this could be seizure related as well.  Discussed return precautions.  See media file for her slums exam and clock.  Will recheck labs today.  Patient verbalized understanding agreeable treatment plan.    Follow Up   Return in about 3 months (around 6/6/2024) for Recheck.  Patient was given instructions and counseling regarding her condition or for  health maintenance advice. Please see specific information pulled into the AVS if appropriate.       Part of this note may be electronic transcription/translation of spoken language to printed text using the Dragon dictation system

## 2024-03-07 DIAGNOSIS — E87.5 HYPERKALEMIA: Primary | ICD-10-CM

## 2024-03-07 DIAGNOSIS — E87.1 HYPONATREMIA: ICD-10-CM

## 2024-03-07 LAB
ALBUMIN SERPL-MCNC: 3.8 G/DL (ref 3.5–5.2)
ALBUMIN/GLOB SERPL: 1.5 G/DL
ALP SERPL-CCNC: 62 U/L (ref 39–117)
ALT SERPL W P-5'-P-CCNC: 21 U/L (ref 1–33)
ANION GAP SERPL CALCULATED.3IONS-SCNC: 9.7 MMOL/L (ref 5–15)
AST SERPL-CCNC: 23 U/L (ref 1–32)
BASOPHILS # BLD AUTO: 0.02 10*3/MM3 (ref 0–0.2)
BASOPHILS NFR BLD AUTO: 0.4 % (ref 0–1.5)
BILIRUB SERPL-MCNC: <0.2 MG/DL (ref 0–1.2)
BUN SERPL-MCNC: 15 MG/DL (ref 8–23)
BUN/CREAT SERPL: 20.3 (ref 7–25)
CALCIUM SPEC-SCNC: 9.4 MG/DL (ref 8.6–10.5)
CHLORIDE SERPL-SCNC: 94 MMOL/L (ref 98–107)
CO2 SERPL-SCNC: 27.3 MMOL/L (ref 22–29)
CREAT SERPL-MCNC: 0.74 MG/DL (ref 0.57–1)
DEPRECATED RDW RBC AUTO: 40.4 FL (ref 37–54)
EGFRCR SERPLBLD CKD-EPI 2021: 83.4 ML/MIN/1.73
EOSINOPHIL # BLD AUTO: 0.16 10*3/MM3 (ref 0–0.4)
EOSINOPHIL NFR BLD AUTO: 2.9 % (ref 0.3–6.2)
ERYTHROCYTE [DISTWIDTH] IN BLOOD BY AUTOMATED COUNT: 11.9 % (ref 12.3–15.4)
GLOBULIN UR ELPH-MCNC: 2.6 GM/DL
GLUCOSE SERPL-MCNC: 254 MG/DL (ref 65–99)
HBA1C MFR BLD: 7.4 % (ref 4.8–5.6)
HCT VFR BLD AUTO: 35.1 % (ref 34–46.6)
HGB BLD-MCNC: 12 G/DL (ref 12–15.9)
IMM GRANULOCYTES # BLD AUTO: 0.02 10*3/MM3 (ref 0–0.05)
IMM GRANULOCYTES NFR BLD AUTO: 0.4 % (ref 0–0.5)
LYMPHOCYTES # BLD AUTO: 1.21 10*3/MM3 (ref 0.7–3.1)
LYMPHOCYTES NFR BLD AUTO: 22.1 % (ref 19.6–45.3)
MCH RBC QN AUTO: 32.1 PG (ref 26.6–33)
MCHC RBC AUTO-ENTMCNC: 34.2 G/DL (ref 31.5–35.7)
MCV RBC AUTO: 93.9 FL (ref 79–97)
MONOCYTES # BLD AUTO: 0.4 10*3/MM3 (ref 0.1–0.9)
MONOCYTES NFR BLD AUTO: 7.3 % (ref 5–12)
NEUTROPHILS NFR BLD AUTO: 3.66 10*3/MM3 (ref 1.7–7)
NEUTROPHILS NFR BLD AUTO: 66.9 % (ref 42.7–76)
NRBC BLD AUTO-RTO: 0 /100 WBC (ref 0–0.2)
PLATELET # BLD AUTO: 248 10*3/MM3 (ref 140–450)
PMV BLD AUTO: 10.3 FL (ref 6–12)
POTASSIUM SERPL-SCNC: 5.5 MMOL/L (ref 3.5–5.2)
PROT SERPL-MCNC: 6.4 G/DL (ref 6–8.5)
RBC # BLD AUTO: 3.74 10*6/MM3 (ref 3.77–5.28)
SODIUM SERPL-SCNC: 131 MMOL/L (ref 136–145)
WBC NRBC COR # BLD AUTO: 5.47 10*3/MM3 (ref 3.4–10.8)

## 2024-03-08 LAB — BACTERIA SPEC AEROBE CULT: ABNORMAL

## 2024-03-15 ENCOUNTER — CLINICAL SUPPORT (OUTPATIENT)
Dept: FAMILY MEDICINE CLINIC | Facility: CLINIC | Age: 79
End: 2024-03-15
Payer: MEDICARE

## 2024-03-15 DIAGNOSIS — E87.1 HYPONATREMIA: ICD-10-CM

## 2024-03-15 DIAGNOSIS — E87.5 HYPERKALEMIA: ICD-10-CM

## 2024-03-15 LAB
ALBUMIN SERPL-MCNC: 3.7 G/DL (ref 3.5–5.2)
ALBUMIN/GLOB SERPL: 1.4 G/DL
ALP SERPL-CCNC: 60 U/L (ref 39–117)
ALT SERPL W P-5'-P-CCNC: 28 U/L (ref 1–33)
ANION GAP SERPL CALCULATED.3IONS-SCNC: 11 MMOL/L (ref 5–15)
AST SERPL-CCNC: 35 U/L (ref 1–32)
BILIRUB SERPL-MCNC: 0.2 MG/DL (ref 0–1.2)
BUN SERPL-MCNC: 18 MG/DL (ref 8–23)
BUN/CREAT SERPL: 20.9 (ref 7–25)
CALCIUM SPEC-SCNC: 9.6 MG/DL (ref 8.6–10.5)
CHLORIDE SERPL-SCNC: 93 MMOL/L (ref 98–107)
CO2 SERPL-SCNC: 24 MMOL/L (ref 22–29)
CREAT SERPL-MCNC: 0.86 MG/DL (ref 0.57–1)
EGFRCR SERPLBLD CKD-EPI 2021: 69.7 ML/MIN/1.73
GLOBULIN UR ELPH-MCNC: 2.6 GM/DL
GLUCOSE SERPL-MCNC: 121 MG/DL (ref 65–99)
POTASSIUM SERPL-SCNC: 5.1 MMOL/L (ref 3.5–5.2)
PROT SERPL-MCNC: 6.3 G/DL (ref 6–8.5)
SODIUM SERPL-SCNC: 128 MMOL/L (ref 136–145)

## 2024-03-15 PROCEDURE — 80053 COMPREHEN METABOLIC PANEL: CPT | Performed by: NURSE PRACTITIONER

## 2024-03-18 DIAGNOSIS — E87.1 HYPONATREMIA: Primary | ICD-10-CM

## 2024-03-22 ENCOUNTER — OFFICE VISIT (OUTPATIENT)
Dept: FAMILY MEDICINE CLINIC | Facility: CLINIC | Age: 79
End: 2024-03-22
Payer: MEDICARE

## 2024-03-22 VITALS
OXYGEN SATURATION: 97 % | HEART RATE: 68 BPM | BODY MASS INDEX: 23.19 KG/M2 | WEIGHT: 126 LBS | TEMPERATURE: 97 F | SYSTOLIC BLOOD PRESSURE: 134 MMHG | HEIGHT: 62 IN | DIASTOLIC BLOOD PRESSURE: 76 MMHG

## 2024-03-22 DIAGNOSIS — Z09 FOLLOW-UP EXAM: Primary | ICD-10-CM

## 2024-03-22 DIAGNOSIS — R26.89 DECREASED MOBILITY: ICD-10-CM

## 2024-03-22 DIAGNOSIS — G62.9 NEUROPATHY: ICD-10-CM

## 2024-03-22 LAB
COLLECT DURATION TIME UR: 24 HRS
SODIUM 24H UR-SRATE: 105 MMOL/24HRS (ref 40–220)
SODIUM UR-SCNC: 62 MMOL/L
SPECIMEN VOL 24H UR: 1700 ML

## 2024-03-22 PROCEDURE — 81050 URINALYSIS VOLUME MEASURE: CPT | Performed by: NURSE PRACTITIONER

## 2024-03-22 PROCEDURE — 84300 ASSAY OF URINE SODIUM: CPT | Performed by: NURSE PRACTITIONER

## 2024-03-22 RX ORDER — GABAPENTIN 400 MG/1
CAPSULE ORAL
Qty: 120 CAPSULE | Refills: 2 | Status: SHIPPED | OUTPATIENT
Start: 2024-03-22

## 2024-03-22 RX ORDER — SENNOSIDES 8.6 MG
650 CAPSULE ORAL EVERY 8 HOURS PRN
COMMUNITY

## 2024-03-22 NOTE — PROGRESS NOTES
Chief Complaint  imbalance (Pt reports having both a singular cane and roller walker w/seat), Headache (Low and dull, occurring more frequently), and Restless Legs Syndrome    Subjective        Medical History: has a past medical history of Arthritis, DDD (degenerative disc disease), lumbar, Diabetes, Fibromyalgia, GERD (gastroesophageal reflux disease), Hyperlipidemia, Palsy, Poor circulation, Sleep apnea, and Stress incontinence.     Surgical History: has a past surgical history that includes Lumbar fusion (2013); Cholecystectomy (1998); Hysterectomy; Knee surgery; Knee surgery; Total hip arthroplasty (Right, 05/06/2019); and Cataract extraction (Bilateral).     Family History: family history includes Cancer in her brother; Diabetes in her father and sister; Heart disease in her father and mother.     Social History: reports that she has never smoked. She has never used smokeless tobacco. She reports that she does not drink alcohol and does not use drugs.    Maureen Hernandez presents to [facility]  Rash    Patient is complaining of a rash in her perianal region.  She states has been ongoing since she started antibiotics for her UTI.  Patient states she has been put Neosporin and lavender/tea tree oil on the area and is gradually improving.  She states it does burn the rash when she urinates.  She denies any burning when urinating within the urethra, no frequency or hesitancy.    Patient is continuing with some imbalance issues.  She has shaking of her legs when she stands, and shaking of her hands.  She does feel more unsteady on her feet and does need assistance of others if she is walking or assistance with cane or walker.  No recent falls within the home, daughter is currently moving into the home to assist mom.  We did discuss options including physical therapy, I think patient would benefit from physical therapy, she does have an appointment next week with neurology and  "daughter and patient would like to discuss with neurology about her current issues to see if any medications can help with the movement disorder.  I did spoke with daughter and told her that if they are still agreeable with physical therapy after visiting the neurologist please let me know and I can send the order over for patient to start physical therapy.    Patient continues to have restless leg with burning in her legs.  She has been having trouble sleeping due to the restless leg and burning in her legs.  She states that she will wake up around 3:30 AM and burning in both legs just below the knee down to her feet.  She is currently on gabapentin 300 mg and has been on that dose for several years.  She states she just does not feel like the medication is helping anymore.    Objective   Vital Signs:   /76 (BP Location: Left arm, Patient Position: Sitting, Cuff Size: Adult)   Pulse 68   Temp 97 °F (36.1 °C) (Infrared)   Ht 157.5 cm (62\")   Wt 57.2 kg (126 lb)   SpO2 97%   BMI 23.05 kg/m²       Wt Readings from Last 3 Encounters:   03/22/24 57.2 kg (126 lb)   03/06/24 58.7 kg (129 lb 4.8 oz)   02/15/24 57.2 kg (126 lb)        BP Readings from Last 3 Encounters:   03/22/24 134/76   03/06/24 124/76   02/15/24 134/75        BMI is within normal parameters. No other follow-up for BMI required.       Physical Exam  Vitals reviewed.   Constitutional:       Appearance: Normal appearance. She is well-developed.   HENT:      Head: Normocephalic and atraumatic.   Eyes:      Conjunctiva/sclera: Conjunctivae normal.      Pupils: Pupils are equal, round, and reactive to light.   Cardiovascular:      Rate and Rhythm: Normal rate and regular rhythm.      Heart sounds: No murmur heard.     No friction rub.   Pulmonary:      Effort: Pulmonary effort is normal.      Breath sounds: Normal breath sounds. No wheezing or rhonchi.   Skin:     General: Skin is warm and dry.   Neurological:      Mental Status: She is alert and " oriented to person, place, and time.      Cranial Nerves: No cranial nerve deficit.      Sensory: No sensory deficit.      Coordination: Coordination abnormal.      Gait: Gait abnormal.      Comments: With standing patient does have rhythmic shaking of both legs, shaking on bilateral hands   Psychiatric:         Mood and Affect: Mood and affect normal.         Behavior: Behavior normal.         Thought Content: Thought content normal.         Judgment: Judgment normal.        Result Review :  {The following data was reviewed by OLIVIA Ross on 03/22/2024.  Common labs          10/2/2023    16:43 3/6/2024    14:59 3/15/2024    08:56   Common Labs   Glucose 112  254  121    BUN 18  15  18    Creatinine 0.96  0.74  0.86    Sodium 133  131  128    Potassium 4.3  5.5  5.1    Chloride 95  94  93    Calcium 10.9  9.4  9.6    Albumin 4.1  3.8  3.7    Total Bilirubin 0.3  <0.2  0.2    Alkaline Phosphatase 52  62  60    AST (SGOT) 36  23  35    ALT (SGPT) 31  21  28    WBC 6.79  5.47     Hemoglobin 13.2  12.0     Hematocrit 38.3  35.1     Platelets 269  248     Total Cholesterol 129      Triglycerides 120      HDL Cholesterol 48      LDL Cholesterol  60      Hemoglobin A1C 5.80  7.40                   Current Outpatient Medications on File Prior to Visit   Medication Sig Dispense Refill    acetaminophen (TYLENOL) 650 MG 8 hr tablet Take 1 tablet by mouth Every 8 (Eight) Hours As Needed for Mild Pain.      aspirin 81 MG EC tablet Take 1 tablet by mouth Daily.      atorvastatin (LIPITOR) 40 MG tablet Take 1 tablet by mouth Daily. 90 tablet 1    Lantus SoloStar 100 UNIT/ML injection pen Inject 45 Units under the skin into the appropriate area as directed Every Night. 15 mL 1    lidocaine (LIDODERM) 5 % Place 1 patch on the skin as directed by provider Every 12 (Twelve) Hours. 30 patch 2    lisinopril (PRINIVIL,ZESTRIL) 5 MG tablet Take 1 tablet by mouth Daily. 90 tablet 1    loperamide (IMODIUM) 2 MG capsule TAKE 1  CAPSULE BY MOUTH FOUR TIMES DAILY AS NEEDED FOR DIARRHEA 90 capsule 3    magnesium oxide (MAG-OX) 400 MG tablet Take 1 tablet by mouth Every Night. 90 tablet 0    metFORMIN (GLUCOPHAGE) 1000 MG tablet Take 1 tablet by mouth 2 (Two) Times a Day With Meals. TAKES 2 TABS TWICE PER  tablet 1    ondansetron (ZOFRAN) 4 MG tablet TAKE 1 TABLET BY MOUTH EVERY 8 HOURS AS NEEDED FOR NAUSEA OR FOR VOMITING 90 tablet 0    pantoprazole (Protonix) 20 MG EC tablet Take 1 tablet by mouth Daily. 90 tablet 1    primidone (MYSOLINE) 50 MG tablet Take 2 tablets by mouth 3 (Three) Times a Day. 540 tablet 1    Probiotic, Lactobacillus, capsule Take 1 capsule by mouth 2 (Two) Times a Day. 60 capsule 1    traMADol (ULTRAM) 50 MG tablet Take 1 tablet by mouth Every 8 (Eight) Hours As Needed for Moderate Pain. 60 tablet 1    [DISCONTINUED] gabapentin (NEURONTIN) 300 MG capsule TAKE 1 CAPSULE BY MOUTH TWICE DAILY 120 capsule 1    [DISCONTINUED] ciprofloxacin (Cipro) 500 MG tablet Take 1 tablet by mouth 2 (Two) Times a Day. (Patient not taking: Reported on 3/22/2024) 14 tablet 0     Current Facility-Administered Medications on File Prior to Visit   Medication Dose Route Frequency Provider Last Rate Last Admin    mupirocin (BACTROBAN) 2 % nasal ointment   Nasal BID Estevan Sharma MD            Assessment and Plan  Diagnoses and all orders for this visit:    1. Follow-up exam (Primary)    2. Decreased mobility due to pain in back  Comments:  Pt is improving withPT, gabapentin and pain medication.  She looks much better today than she did 1 month ago. f/u in 3 months  Orders:  -     gabapentin (NEURONTIN) 400 MG capsule; Take 1 tablet morning and afternoon and 2 tablets at bedtime  Dispense: 120 capsule; Refill: 2    3. Neuropathy    Will increase gabapentin to 400 mg 1 in the morning and 1 in the afternoon and 2 at nighttime, will have patient follow back up and see how she is doing.  Will follow patient's care with neurology, discussed  with daughter to call if they decide to initiate physical therapy.  Daughter and patient both verbalized understanding agreeable treatment plan.  For the rash I discussed with daughter to go get some Desitin diaper cream to use as a moisturizer barrier and help it heal quicker.  Daughter and patient verbalized understanding.      Follow Up   Return in about 1 month (around 4/22/2024) for Recheck.  Patient was given instructions and counseling regarding her condition or for health maintenance advice. Please see specific information pulled into the AVS if appropriate.       Part of this note may be electronic transcription/translation of spoken language to printed text using the Dragon dictation system

## 2024-03-28 ENCOUNTER — TELEPHONE (OUTPATIENT)
Dept: FAMILY MEDICINE CLINIC | Facility: CLINIC | Age: 79
End: 2024-03-28

## 2024-03-28 ENCOUNTER — HOSPITAL ENCOUNTER (OUTPATIENT)
Dept: MRI IMAGING | Facility: HOSPITAL | Age: 79
Discharge: HOME | End: 2024-03-28
Admitting: NURSE PRACTITIONER
Payer: MEDICARE

## 2024-03-28 DIAGNOSIS — R41.3 MEMORY LOSS OF UNKNOWN CAUSE: ICD-10-CM

## 2024-03-28 PROCEDURE — 70551 MRI BRAIN STEM W/O DYE: CPT

## 2024-03-28 NOTE — TELEPHONE ENCOUNTER
UNABLE TO WARM TRANSFER    Caller: Maureen Hernandez    Relationship: Self    Best call back number: [phone]    What is the best time to reach you: ANY TIME    Who are you requesting to speak with (clinical staff, provider,  specific staff member): CLINICAL    Do you know the name of the person who called: NO    What was the call regarding: TEST RESULTS    Is it okay if the provider responds through MyChart: NO

## 2024-05-03 RX ORDER — LISINOPRIL 5 MG/1
5 TABLET ORAL DAILY
Qty: 90 TABLET | Refills: 1 | Status: SHIPPED | OUTPATIENT
Start: 2024-05-03

## 2024-05-03 RX ORDER — ATORVASTATIN CALCIUM 40 MG/1
40 TABLET, FILM COATED ORAL DAILY
Qty: 90 TABLET | Refills: 1 | Status: SHIPPED | OUTPATIENT
Start: 2024-05-03

## 2024-05-13 ENCOUNTER — TELEPHONE (OUTPATIENT)
Dept: FAMILY MEDICINE CLINIC | Facility: CLINIC | Age: 79
End: 2024-05-13

## 2024-05-13 NOTE — TELEPHONE ENCOUNTER
Caller: Maureen Hernandez    Relationship: Self    Best call back number: [phone]    What medication are you requesting: SLEEPING AID, PAIN MEDICATION FOR NEUROPATHY THE GABAPENTIN NOT WORKING     What are your current symptoms: PAIN IN BODY AND NO SLEEP       If a prescription is needed, what is your preferred pharmacy and phone number: [address] [phone] [facility] [phone] [facility]     Additional notes: PATIENT'S NEUROLOGY TOOK HER PRIMIDONE AND PUT HER ON SOMETHING ELSE. SHE IS STRUGGLING WITH SLEEPING AND IN LOTS OF PAIN DUE TO NEUROPATHY. SHE HAS BAD HEADACHES EVERYDAY, THE TREMORS ARE GETTING WORSE AND HER BALANCE IS STILL BAD.

## 2024-05-13 NOTE — TELEPHONE ENCOUNTER
CALLED AND SPOKE WITH DAUGHTER TO SCHEDULE AN APPOINTMENT SHE STATED SHE WILL CALL BACK TO SCHEDULE HER.

## 2024-05-15 ENCOUNTER — OFFICE VISIT (OUTPATIENT)
Dept: FAMILY MEDICINE CLINIC | Facility: CLINIC | Age: 79
End: 2024-05-15
Payer: MEDICARE

## 2024-05-15 VITALS
WEIGHT: 124 LBS | DIASTOLIC BLOOD PRESSURE: 67 MMHG | OXYGEN SATURATION: 95 % | HEIGHT: 62 IN | SYSTOLIC BLOOD PRESSURE: 120 MMHG | BODY MASS INDEX: 22.82 KG/M2 | TEMPERATURE: 98.5 F | HEART RATE: 91 BPM

## 2024-05-15 DIAGNOSIS — G25.0 ESSENTIAL TREMOR: Primary | ICD-10-CM

## 2024-05-15 DIAGNOSIS — Z78.9 MEDICATION INTOLERANCE: ICD-10-CM

## 2024-05-15 DIAGNOSIS — G47.00 INSOMNIA, UNSPECIFIED TYPE: ICD-10-CM

## 2024-05-15 RX ORDER — TOPIRAMATE 50 MG/1
50 TABLET, FILM COATED ORAL 2 TIMES DAILY
COMMUNITY
Start: 2024-04-18 | End: 2024-05-15

## 2024-05-15 RX ORDER — TRAZODONE HYDROCHLORIDE 50 MG/1
25-50 TABLET ORAL NIGHTLY
Qty: 30 TABLET | Refills: 2 | Status: SHIPPED | OUTPATIENT
Start: 2024-05-15

## 2024-05-15 RX ORDER — GABAPENTIN 300 MG/1
1 CAPSULE ORAL EVERY 12 HOURS SCHEDULED
COMMUNITY
Start: 2024-04-12

## 2024-05-15 NOTE — PROGRESS NOTES
Chief Complaint  Imbalance, Tremors, and Neurologic Problem (Pt reports completing MRI 03/2024 and following up with Neurology specialty. Advised to stop Rx- primidone (MYSOLINE) 50 MG tablet and started Rx -/topiramate (TOPAMAX) 50 MG tablet, unable to tolerate Rx x3W )    Subjective        Medical History: has a past medical history of Arthritis, DDD (degenerative disc disease), lumbar, Diabetes, Fibromyalgia, GERD (gastroesophageal reflux disease), Hyperlipidemia, Palsy, Poor circulation, Sleep apnea, and Stress incontinence.     Surgical History: has a past surgical history that includes Lumbar fusion (2013); Cholecystectomy (1998); Hysterectomy; Knee surgery; Knee surgery; Total hip arthroplasty (Right, 05/06/2019); and Cataract extraction (Bilateral).     Family History: family history includes Cancer in her brother; Diabetes in her father and sister; Heart disease in her father and mother.     Social History: reports that she has never smoked. She has never used smokeless tobacco. She reports that she does not drink alcohol and does not use drugs.    Maureen Hernandez presents to [facility]  Neurologic Problem  The patient's primary symptoms include weakness. The patient's pertinent negatives include no visual change. Primary symptoms comment: Worsening of essential tremors. This is a chronic problem. The current episode started more than 1 year ago. The problem has been rapidly worsening since onset. There was no focality noted. Associated symptoms include fatigue. Pertinent negatives include no chest pain or headaches.   Insomnia  This is a chronic problem. The current episode started more than 1 year ago. Episode frequency: daily. Associated symptoms include fatigue and weakness. Pertinent negatives include no anorexia, chest pain, congestion, headaches, myalgias, sore throat or visual change. The symptoms are aggravated by stress. Treatments tried: Melatonin Gummies. The  "treatment provided mild relief.     Patient was seen by neurology Dr. Christy for essential tremors was taking off of her Primidone and started  her on Topiramate and is having side effects. Tremors have become more severe, she has had a decrease in appetite. States that she is unable to drive, can't do her water aerobic exercises or go to [employer].  Patient states that she would like to stop the Topamax and start back on the primidone.  She states that her symptoms were controlled better with the primidone that is being controlled with the topiramate.  She states she does not want to go back to see their neurologist Dr. Christy and would like to just continue on primidone at this time.  Patient does live with her daughter, she is in assistance at home, she states she uses a cane and a walker at this time due to her unsteady gait and tremors.  Objective   Vital Signs:   /67 (BP Location: Left arm, Patient Position: Sitting, Cuff Size: Adult)   Pulse 91   Temp 98.5 °F (36.9 °C) (Infrared)   Ht 157.5 cm (62\")   Wt 56.2 kg (124 lb)   SpO2 95%   BMI 22.68 kg/m²       Wt Readings from Last 3 Encounters:   05/15/24 56.2 kg (124 lb)   03/22/24 57.2 kg (126 lb)   03/06/24 58.7 kg (129 lb 4.8 oz)        BP Readings from Last 3 Encounters:   05/15/24 120/67   03/22/24 134/76   03/06/24 124/76        BMI is within normal parameters. No other follow-up for BMI required.       Physical Exam  Vitals reviewed.   Constitutional:       Appearance: Normal appearance. She is well-developed.   HENT:      Head: Normocephalic and atraumatic.   Eyes:      Conjunctiva/sclera: Conjunctivae normal.      Pupils: Pupils are equal, round, and reactive to light.   Cardiovascular:      Rate and Rhythm: Normal rate and regular rhythm.      Heart sounds: No murmur heard.  Pulmonary:      Effort: Pulmonary effort is normal.      Breath sounds: Normal breath sounds. No wheezing.   Skin:     General: Skin is warm and dry.   Neurological:      " Mental Status: She is alert and oriented to person, place, and time.      Motor: Weakness and tremor present.      Gait: Gait abnormal.   Psychiatric:         Mood and Affect: Mood and affect normal.         Behavior: Behavior normal.         Thought Content: Thought content normal.         Judgment: Judgment normal.        Result Review :  {The following data was reviewed by OLIVIA Ross on 05/15/2024.  Common labs          10/2/2023    16:43 3/6/2024    14:59 3/15/2024    08:56   Common Labs   Glucose 112  254  121    BUN 18  15  18    Creatinine 0.96  0.74  0.86    Sodium 133  131  128    Potassium 4.3  5.5  5.1    Chloride 95  94  93    Calcium 10.9  9.4  9.6    Albumin 4.1  3.8  3.7    Total Bilirubin 0.3  <0.2  0.2    Alkaline Phosphatase 52  62  60    AST (SGOT) 36  23  35    ALT (SGPT) 31  21  28    WBC 6.79  5.47     Hemoglobin 13.2  12.0     Hematocrit 38.3  35.1     Platelets 269  248     Total Cholesterol 129      Triglycerides 120      HDL Cholesterol 48      LDL Cholesterol  60      Hemoglobin A1C 5.80  7.40       Data reviewed : Recent neurology note              Current Outpatient Medications on File Prior to Visit   Medication Sig Dispense Refill    acetaminophen (TYLENOL) 650 MG 8 hr tablet Take 1 tablet by mouth Every 8 (Eight) Hours As Needed for Mild Pain.      aspirin 81 MG EC tablet Take 1 tablet by mouth Daily.      atorvastatin (LIPITOR) 40 MG tablet TAKE 1 TABLET BY MOUTH DAILY 90 tablet 1    gabapentin (NEURONTIN) 400 MG capsule Take 1 tablet morning and afternoon and 2 tablets at bedtime 120 capsule 2    Lantus SoloStar 100 UNIT/ML injection pen Inject 45 Units under the skin into the appropriate area as directed Every Night. 15 mL 1    lidocaine (LIDODERM) 5 % Place 1 patch on the skin as directed by provider Every 12 (Twelve) Hours. 30 patch 2    lisinopril (PRINIVIL,ZESTRIL) 5 MG tablet TAKE 1 TABLET BY MOUTH DAILY 90 tablet 1    loperamide (IMODIUM) 2 MG capsule TAKE 1  CAPSULE BY MOUTH FOUR TIMES DAILY AS NEEDED FOR DIARRHEA 90 capsule 3    magnesium oxide (MAG-OX) 400 MG tablet Take 1 tablet by mouth Every Night. 90 tablet 0    metFORMIN (GLUCOPHAGE) 1000 MG tablet TAKE 2 TABLETS BY MOUTH TWICE DAILY WITH MEALS 180 tablet 1    ondansetron (ZOFRAN) 4 MG tablet TAKE 1 TABLET BY MOUTH EVERY 8 HOURS AS NEEDED FOR NAUSEA OR FOR VOMITING 90 tablet 0    pantoprazole (Protonix) 20 MG EC tablet Take 1 tablet by mouth Daily. 90 tablet 1    traMADol (ULTRAM) 50 MG tablet Take 1 tablet by mouth Every 8 (Eight) Hours As Needed for Moderate Pain. 60 tablet 1    gabapentin (NEURONTIN) 300 MG capsule Take 1 capsule by mouth Every 12 (Twelve) Hours.      primidone (MYSOLINE) 50 MG tablet Take 2 tablets by mouth 3 (Three) Times a Day. (Patient not taking: Reported on 5/15/2024) 540 tablet 1    Probiotic, Lactobacillus, capsule Take 1 capsule by mouth 2 (Two) Times a Day. (Patient not taking: Reported on 5/15/2024) 60 capsule 1     Current Facility-Administered Medications on File Prior to Visit   Medication Dose Route Frequency Provider Last Rate Last Admin    mupirocin (BACTROBAN) 2 % nasal ointment   Nasal BID Estevan Sharma MD            Assessment and Plan  Diagnoses and all orders for this visit:    1. Essential tremor (worsening) (Primary)    2. Medication intolerance    3. Insomnia, unspecified type    Other orders  -     traZODone (DESYREL) 50 MG tablet; Take 0.5-1 tablets by mouth Every Night.  Dispense: 30 tablet; Refill: 2    Will stop Toprol mate instructed to start back on primidone, discussed with patient to start back on primidone 50 mg 3 times daily for a week then increase to 100 mg 3 times daily as it was previously.  Stop topiramate since symptoms are worsening and impairing with patient's daily ADLs.    Will do trial of trazodone to help with insomnia, discussed return precautions would like to see patient back in a month and see how things are going with essential tremors and  insomnia.  Discussed return precautions.  Patient verbalized understanding agreeable with current treatment plan.    Follow Up   Return in about 1 month (around 6/15/2024) for Recheck.  Patient was given instructions and counseling regarding her condition or for health maintenance advice. Please see specific information pulled into the AVS if appropriate.       Part of this note may be electronic transcription/translation of spoken language to printed text using the Dragon dictation system

## 2024-05-29 RX ORDER — MAGNESIUM OXIDE 400 MG/1
1 TABLET ORAL NIGHTLY
Qty: 90 TABLET | Refills: 0 | Status: SHIPPED | OUTPATIENT
Start: 2024-05-29

## 2024-05-29 RX ORDER — INSULIN GLARGINE 100 [IU]/ML
45 INJECTION, SOLUTION SUBCUTANEOUS NIGHTLY
Qty: 15 ML | Refills: 1 | Status: SHIPPED | OUTPATIENT
Start: 2024-05-29

## 2024-05-29 NOTE — TELEPHONE ENCOUNTER
Caller: Maureen Hernandze    Relationship: Self    Best call back number: [phone]     Requested Prescriptions:   Requested Prescriptions     Pending Prescriptions Disp Refills    magnesium oxide (MAG-OX) 400 MG tablet 90 tablet 0     Sig: Take 1 tablet by mouth Every Night.    Lantus SoloStar 100 UNIT/ML injection pen 15 mL 1     Sig: Inject 45 Units under the skin into the appropriate area as directed Every Night.        Pharmacy where request should be sent: [address] [phone] PH - [phone]      Last office visit with prescribing clinician: 5/15/2024   Last telemedicine visit with prescribing clinician: Visit date not found   Next office visit with prescribing clinician: Visit date not found     Additional details provided by patient: PATIENT REQUESTING A COUPLE MONTH SUPPLY. PATIENT IS GOING OUT OF TOWN.     Does the patient have less than a 3 day supply:  [x] Yes  [] No    Would you like a call back once the refill request has been completed: [] Yes [] No    If the office needs to give you a call back, can they leave a voicemail: [x] Yes [] No    Anthony De La Torre Rep   05/29/24 15:29 EDT       MYCIMERT NO, CALL PREFERRED

## 2024-05-31 DIAGNOSIS — R26.89 DECREASED MOBILITY: ICD-10-CM

## 2024-05-31 RX ORDER — TRAZODONE HYDROCHLORIDE 50 MG/1
25-50 TABLET ORAL NIGHTLY
Qty: 30 TABLET | Refills: 2 | Status: SHIPPED | OUTPATIENT
Start: 2024-05-31

## 2024-05-31 RX ORDER — GABAPENTIN 400 MG/1
CAPSULE ORAL
Qty: 120 CAPSULE | Refills: 2 | Status: CANCELLED | OUTPATIENT
Start: 2024-05-31

## 2024-06-01 DIAGNOSIS — R26.89 OTHER ABNORMALITIES OF GAIT AND MOBILITY: ICD-10-CM

## 2024-06-03 RX ORDER — GABAPENTIN 300 MG/1
300 CAPSULE ORAL 2 TIMES DAILY
Qty: 120 CAPSULE | Refills: 1 | Status: SHIPPED | OUTPATIENT
Start: 2024-06-03

## 2024-08-12 ENCOUNTER — TELEPHONE (OUTPATIENT)
Dept: FAMILY MEDICINE CLINIC | Facility: CLINIC | Age: 79
End: 2024-08-12

## 2024-08-12 NOTE — TELEPHONE ENCOUNTER
Caller: Maureen Hernandez    Relationship: Self    Best call back number: [phone]     What orders are you requesting (i.e. lab or imaging): LAB ORDERS FOR UPCOMING APPOINTMENT ON 09/13    In what timeframe would the patient need to come in: WEEK BEFORE APPOINTMENT    Where will you receive your lab/imaging services: IN OFFICE    Additional notes: CONTACT PATIENT WHEN ORDERS HAVE BEEN PUT IN

## 2024-08-14 DIAGNOSIS — I10 ESSENTIAL HYPERTENSION: ICD-10-CM

## 2024-08-14 DIAGNOSIS — E11.9 TYPE 2 DIABETES MELLITUS WITHOUT COMPLICATION, WITHOUT LONG-TERM CURRENT USE OF INSULIN: Primary | ICD-10-CM

## 2024-08-14 DIAGNOSIS — E78.2 MIXED HYPERLIPIDEMIA: ICD-10-CM

## 2024-08-14 NOTE — TELEPHONE ENCOUNTER
Spoke with pt regarding note left by PCP in regards to future lab orders and instructions, pt verbalized understanding.

## 2024-09-10 ENCOUNTER — LAB (OUTPATIENT)
Dept: LAB | Facility: HOSPITAL | Age: 79
End: 2024-09-10
Payer: MEDICARE

## 2024-09-10 DIAGNOSIS — E87.1 HYPONATREMIA: ICD-10-CM

## 2024-09-10 DIAGNOSIS — E11.9 TYPE 2 DIABETES MELLITUS WITHOUT COMPLICATION, WITHOUT LONG-TERM CURRENT USE OF INSULIN: ICD-10-CM

## 2024-09-10 DIAGNOSIS — E78.2 MIXED HYPERLIPIDEMIA: ICD-10-CM

## 2024-09-10 DIAGNOSIS — I10 ESSENTIAL HYPERTENSION: ICD-10-CM

## 2024-09-10 LAB
ALBUMIN SERPL-MCNC: 4.2 G/DL (ref 3.5–5.2)
ALBUMIN UR-MCNC: <1.2 MG/DL
ALBUMIN/GLOB SERPL: 1.8 G/DL
ALP SERPL-CCNC: 76 U/L (ref 39–117)
ALT SERPL W P-5'-P-CCNC: 25 U/L (ref 1–33)
ANION GAP SERPL CALCULATED.3IONS-SCNC: 10 MMOL/L (ref 5–15)
AST SERPL-CCNC: 22 U/L (ref 1–32)
BASOPHILS # BLD AUTO: 0.03 10*3/MM3 (ref 0–0.2)
BASOPHILS NFR BLD AUTO: 0.5 % (ref 0–1.5)
BILIRUB SERPL-MCNC: 0.2 MG/DL (ref 0–1.2)
BUN SERPL-MCNC: 19 MG/DL (ref 8–23)
BUN/CREAT SERPL: 26 (ref 7–25)
CALCIUM SPEC-SCNC: 9.8 MG/DL (ref 8.6–10.5)
CHLORIDE SERPL-SCNC: 90 MMOL/L (ref 98–107)
CHOLEST SERPL-MCNC: 122 MG/DL (ref 0–200)
CO2 SERPL-SCNC: 29 MMOL/L (ref 22–29)
CREAT SERPL-MCNC: 0.73 MG/DL (ref 0.57–1)
DEPRECATED RDW RBC AUTO: 41.4 FL (ref 37–54)
EGFRCR SERPLBLD CKD-EPI 2021: 84.8 ML/MIN/1.73
EOSINOPHIL # BLD AUTO: 0.18 10*3/MM3 (ref 0–0.4)
EOSINOPHIL NFR BLD AUTO: 3.3 % (ref 0.3–6.2)
ERYTHROCYTE [DISTWIDTH] IN BLOOD BY AUTOMATED COUNT: 12.1 % (ref 12.3–15.4)
GLOBULIN UR ELPH-MCNC: 2.4 GM/DL
GLUCOSE SERPL-MCNC: 94 MG/DL (ref 65–99)
HBA1C MFR BLD: 6.8 % (ref 4.8–5.6)
HCT VFR BLD AUTO: 37.7 % (ref 34–46.6)
HDLC SERPL-MCNC: 54 MG/DL (ref 40–60)
HGB BLD-MCNC: 13.1 G/DL (ref 12–15.9)
IMM GRANULOCYTES # BLD AUTO: 0.02 10*3/MM3 (ref 0–0.05)
IMM GRANULOCYTES NFR BLD AUTO: 0.4 % (ref 0–0.5)
LDLC SERPL CALC-MCNC: 54 MG/DL (ref 0–100)
LDLC/HDLC SERPL: 1.01 {RATIO}
LYMPHOCYTES # BLD AUTO: 1.61 10*3/MM3 (ref 0.7–3.1)
LYMPHOCYTES NFR BLD AUTO: 29.1 % (ref 19.6–45.3)
MCH RBC QN AUTO: 32.6 PG (ref 26.6–33)
MCHC RBC AUTO-ENTMCNC: 34.7 G/DL (ref 31.5–35.7)
MCV RBC AUTO: 93.8 FL (ref 79–97)
MONOCYTES # BLD AUTO: 0.53 10*3/MM3 (ref 0.1–0.9)
MONOCYTES NFR BLD AUTO: 9.6 % (ref 5–12)
NEUTROPHILS NFR BLD AUTO: 3.16 10*3/MM3 (ref 1.7–7)
NEUTROPHILS NFR BLD AUTO: 57.1 % (ref 42.7–76)
NRBC BLD AUTO-RTO: 0 /100 WBC (ref 0–0.2)
OSMOLALITY UR: 516 MOSM/KG (ref 50–1400)
PLATELET # BLD AUTO: 258 10*3/MM3 (ref 140–450)
PMV BLD AUTO: 10.4 FL (ref 6–12)
POTASSIUM SERPL-SCNC: 5 MMOL/L (ref 3.5–5.2)
PROT SERPL-MCNC: 6.6 G/DL (ref 6–8.5)
RBC # BLD AUTO: 4.02 10*6/MM3 (ref 3.77–5.28)
SODIUM SERPL-SCNC: 129 MMOL/L (ref 136–145)
TRIGL SERPL-MCNC: 67 MG/DL (ref 0–150)
TSH SERPL DL<=0.05 MIU/L-ACNC: 2.24 UIU/ML (ref 0.27–4.2)
VLDLC SERPL-MCNC: 14 MG/DL (ref 5–40)
WBC NRBC COR # BLD AUTO: 5.53 10*3/MM3 (ref 3.4–10.8)

## 2024-09-10 PROCEDURE — 84443 ASSAY THYROID STIM HORMONE: CPT

## 2024-09-10 PROCEDURE — 85025 COMPLETE CBC W/AUTO DIFF WBC: CPT

## 2024-09-10 PROCEDURE — 80061 LIPID PANEL: CPT

## 2024-09-10 PROCEDURE — 83036 HEMOGLOBIN GLYCOSYLATED A1C: CPT

## 2024-09-10 PROCEDURE — 82043 UR ALBUMIN QUANTITATIVE: CPT

## 2024-09-10 PROCEDURE — 83935 ASSAY OF URINE OSMOLALITY: CPT

## 2024-09-10 PROCEDURE — 80053 COMPREHEN METABOLIC PANEL: CPT

## 2024-09-13 ENCOUNTER — OFFICE VISIT (OUTPATIENT)
Dept: FAMILY MEDICINE CLINIC | Facility: CLINIC | Age: 79
End: 2024-09-13
Payer: MEDICARE

## 2024-09-13 VITALS
SYSTOLIC BLOOD PRESSURE: 130 MMHG | WEIGHT: 127 LBS | TEMPERATURE: 95.5 F | OXYGEN SATURATION: 98 % | HEIGHT: 62 IN | BODY MASS INDEX: 23.37 KG/M2 | HEART RATE: 79 BPM | DIASTOLIC BLOOD PRESSURE: 78 MMHG

## 2024-09-13 DIAGNOSIS — E11.9 TYPE 2 DIABETES MELLITUS WITHOUT COMPLICATION, WITHOUT LONG-TERM CURRENT USE OF INSULIN: ICD-10-CM

## 2024-09-13 DIAGNOSIS — G47.00 INSOMNIA, UNSPECIFIED TYPE: ICD-10-CM

## 2024-09-13 DIAGNOSIS — G89.29 CHRONIC MIDLINE LOW BACK PAIN WITHOUT SCIATICA: ICD-10-CM

## 2024-09-13 DIAGNOSIS — M54.50 CHRONIC MIDLINE LOW BACK PAIN WITHOUT SCIATICA: ICD-10-CM

## 2024-09-13 DIAGNOSIS — G25.0 ESSENTIAL TREMOR: ICD-10-CM

## 2024-09-13 DIAGNOSIS — E78.2 MIXED HYPERLIPIDEMIA: ICD-10-CM

## 2024-09-13 DIAGNOSIS — I10 ESSENTIAL HYPERTENSION: ICD-10-CM

## 2024-09-13 DIAGNOSIS — K52.9 CHRONIC DIARRHEA: ICD-10-CM

## 2024-09-13 DIAGNOSIS — R26.89 DECREASED MOBILITY: ICD-10-CM

## 2024-09-13 DIAGNOSIS — Z00.00 ENCOUNTER FOR SUBSEQUENT ANNUAL WELLNESS VISIT (AWV) IN MEDICARE PATIENT: Primary | ICD-10-CM

## 2024-09-13 RX ORDER — LISINOPRIL 5 MG/1
5 TABLET ORAL DAILY
Qty: 90 TABLET | Refills: 1 | Status: SHIPPED | OUTPATIENT
Start: 2024-09-13

## 2024-09-13 RX ORDER — TRAMADOL HYDROCHLORIDE 50 MG/1
50 TABLET ORAL EVERY 8 HOURS PRN
Qty: 60 TABLET | Refills: 1 | Status: SHIPPED | OUTPATIENT
Start: 2024-09-13

## 2024-09-13 RX ORDER — ACETAMINOPHEN 500 MG
1 TABLET ORAL DAILY
Qty: 90 CAPSULE | Refills: 1 | Status: SHIPPED | OUTPATIENT
Start: 2024-09-13

## 2024-09-13 RX ORDER — MAGNESIUM OXIDE 400 MG/1
1 TABLET ORAL NIGHTLY
Qty: 90 TABLET | Refills: 0 | Status: SHIPPED | OUTPATIENT
Start: 2024-09-13

## 2024-09-13 RX ORDER — TRAZODONE HYDROCHLORIDE 50 MG/1
25-50 TABLET, FILM COATED ORAL NIGHTLY
Qty: 90 TABLET | Refills: 1 | Status: SHIPPED | OUTPATIENT
Start: 2024-09-13

## 2024-09-13 RX ORDER — INSULIN GLARGINE 100 [IU]/ML
45 INJECTION, SOLUTION SUBCUTANEOUS NIGHTLY
Qty: 41 ML | Refills: 0 | Status: SHIPPED | OUTPATIENT
Start: 2024-09-13 | End: 2024-12-12

## 2024-09-13 RX ORDER — ATORVASTATIN CALCIUM 40 MG/1
40 TABLET, FILM COATED ORAL DAILY
Qty: 90 TABLET | Refills: 1 | Status: SHIPPED | OUTPATIENT
Start: 2024-09-13

## 2024-09-13 RX ORDER — PANTOPRAZOLE SODIUM 20 MG/1
20 TABLET, DELAYED RELEASE ORAL DAILY
Qty: 90 TABLET | Refills: 1 | Status: SHIPPED | OUTPATIENT
Start: 2024-09-13

## 2024-09-13 RX ORDER — SENNOSIDES 8.6 MG
650 CAPSULE ORAL EVERY 8 HOURS PRN
Qty: 120 TABLET | Refills: 1 | Status: SHIPPED | OUTPATIENT
Start: 2024-09-13

## 2024-09-13 RX ORDER — LIDOCAINE 50 MG/G
1 PATCH TOPICAL EVERY 12 HOURS
Qty: 90 PATCH | Refills: 2 | Status: SHIPPED | OUTPATIENT
Start: 2024-09-13

## 2024-09-13 RX ORDER — PRIMIDONE 50 MG/1
100 TABLET ORAL 3 TIMES DAILY
Qty: 540 TABLET | Refills: 1 | Status: SHIPPED | OUTPATIENT
Start: 2024-09-13

## 2024-09-13 RX ORDER — DIPHENOXYLATE HCL/ATROPINE 2.5-.025MG
1 TABLET ORAL 4 TIMES DAILY PRN
Qty: 90 TABLET | Refills: 1 | Status: SHIPPED | OUTPATIENT
Start: 2024-09-13

## 2024-09-13 RX ORDER — AZELASTINE 1 MG/ML
2 SPRAY, METERED NASAL 2 TIMES DAILY
Qty: 30 ML | Refills: 3 | Status: SHIPPED | OUTPATIENT
Start: 2024-09-13

## 2024-09-13 RX ORDER — AZELASTINE 1 MG/ML
2 SPRAY, METERED NASAL 2 TIMES DAILY
Qty: 30 ML | Refills: 3 | Status: SHIPPED | OUTPATIENT
Start: 2024-09-13 | End: 2024-09-13 | Stop reason: SDUPTHER

## 2024-09-13 RX ORDER — ONDANSETRON 4 MG/1
4 TABLET, FILM COATED ORAL EVERY 8 HOURS PRN
Qty: 90 TABLET | Refills: 1 | Status: SHIPPED | OUTPATIENT
Start: 2024-09-13

## 2024-09-13 RX ORDER — ASPIRIN 81 MG/1
81 TABLET ORAL DAILY
Qty: 90 TABLET | Refills: 1 | Status: SHIPPED | OUTPATIENT
Start: 2024-09-13

## 2024-09-13 RX ORDER — GABAPENTIN 400 MG/1
CAPSULE ORAL
Qty: 270 CAPSULE | Refills: 0 | Status: SHIPPED | OUTPATIENT
Start: 2024-09-13

## 2024-09-13 NOTE — PROGRESS NOTES
Subjective   The ABCs of the Annual Wellness Visit  Medicare Wellness Visit      Maureen Hernandez is a 79 y.o. patient who presents for a Medicare Wellness Visit.    The following portions of the patient's history were reviewed and   updated as appropriate: allergies, current medications, past family history, past medical history, past social history, past surgical history, and problem list.    Compared to one year ago, the patient's physical   health is the same.  Compared to one year ago, the patient's mental   health is the same.    Recent Hospitalizations:  She was not admitted within the past 365 days hospital.     Current Medical Providers:  Patient Care Team:  Elodia Latham APRN as PCP - General (Nurse Practitioner)  Ángel Ruelas MD as Surgeon (Orthopedic Surgery)    Outpatient Medications Prior to Visit   Medication Sig Dispense Refill    acetaminophen (TYLENOL) 650 MG 8 hr tablet Take 1 tablet by mouth Every 8 (Eight) Hours As Needed for Mild Pain.      aspirin 81 MG EC tablet Take 1 tablet by mouth Daily.      atorvastatin (LIPITOR) 40 MG tablet TAKE 1 TABLET BY MOUTH DAILY 90 tablet 1    gabapentin (NEURONTIN) 400 MG capsule Take 1 tablet morning and afternoon and 2 tablets at bedtime 120 capsule 2    Lantus SoloStar 100 UNIT/ML injection pen Inject 45 Units under the skin into the appropriate area as directed Every Night. 15 mL 1    lidocaine (LIDODERM) 5 % Place 1 patch on the skin as directed by provider Every 12 (Twelve) Hours. 30 patch 2    lisinopril (PRINIVIL,ZESTRIL) 5 MG tablet TAKE 1 TABLET BY MOUTH DAILY 90 tablet 1    magnesium oxide (MAG-OX) 400 MG tablet Take 1 tablet by mouth Every Night. 90 tablet 0    metFORMIN (GLUCOPHAGE) 1000 MG tablet TAKE 2 TABLETS BY MOUTH TWICE DAILY WITH MEALS 180 tablet 1    ondansetron (ZOFRAN) 4 MG tablet TAKE 1 TABLET BY MOUTH EVERY 8 HOURS AS NEEDED FOR NAUSEA OR FOR VOMITING 90 tablet 0    pantoprazole (Protonix) 20 MG EC tablet Take 1 tablet by  mouth Daily. 90 tablet 1    Probiotic, Lactobacillus, capsule Take 1 capsule by mouth 2 (Two) Times a Day. 60 capsule 1    traMADol (ULTRAM) 50 MG tablet Take 1 tablet by mouth Every 8 (Eight) Hours As Needed for Moderate Pain. 60 tablet 1    traZODone (DESYREL) 50 MG tablet Take 0.5-1 tablets by mouth Every Night. 30 tablet 2    gabapentin (NEURONTIN) 300 MG capsule TAKE 1 CAPSULE BY MOUTH TWICE DAILY (Patient not taking: Reported on 9/13/2024) 120 capsule 1    loperamide (IMODIUM) 2 MG capsule TAKE 1 CAPSULE BY MOUTH FOUR TIMES DAILY AS NEEDED FOR DIARRHEA (Patient not taking: Reported on 9/13/2024) 90 capsule 3    primidone (MYSOLINE) 50 MG tablet Take 2 tablets by mouth 3 (Three) Times a Day. (Patient not taking: Reported on 5/15/2024) 540 tablet 1     Facility-Administered Medications Prior to Visit   Medication Dose Route Frequency Provider Last Rate Last Admin    mupirocin (BACTROBAN) 2 % nasal ointment   Nasal BID Estevan Sharma MD         Opioid medication/s are on active medication list.  and I have evaluated her active treatment plan and pain score trends (see table).  Vitals:    09/13/24 0753   PainLoc: Comment: Lower back pain     I have reviewed the chart for potential of high risk medication and harmful drug interactions in the elderly.        Aspirin is on active medication list. Aspirin use is indicated based on review of current medical condition/s. Pros and cons of this therapy have been discussed today. Benefits of this medication outweigh potential harm.  Patient has been encouraged to continue taking this medication.  .      Patient Active Problem List   Diagnosis    Pain of right sacroiliac joint    Degenerative disc disease, lumbar    Primary osteoarthritis of right hip    Hip pain, right     Advance Care Planning Advance Directive is on file.  ACP discussion was held with the patient during this visit. Patient has an advance directive in EMR which is still valid.             Objective  "  Vitals:    24 0753   BP: 130/78   BP Location: Right arm   Patient Position: Sitting   Cuff Size: Adult   Pulse: 79   Temp: 95.5 °F (35.3 °C)   TempSrc: Infrared   SpO2: 98%   Weight: 57.6 kg (127 lb)   Height: 157.5 cm (62\")   PainLoc: Comment: Lower back pain       Estimated body mass index is 23.23 kg/m² as calculated from the following:    Height as of this encounter: 157.5 cm (62\").    Weight as of this encounter: 57.6 kg (127 lb).    BMI is within normal parameters. No other follow-up for BMI required.       Does the patient have evidence of cognitive impairment? No  Lab Results   Component Value Date    TRIG 67 09/10/2024    HDL 54 09/10/2024    LDL 54 09/10/2024    VLDL 14 09/10/2024    HGBA1C 6.80 (H) 09/10/2024                                                                                                Health  Risk Assessment    Smoking Status:  Social History     Tobacco Use   Smoking Status Never   Smokeless Tobacco Never     Alcohol Consumption:  Social History     Substance and Sexual Activity   Alcohol Use No       Fall Risk Screen  STEADI Fall Risk Assessment was completed, and patient is at LOW risk for falls.Assessment completed on:2024    Depression Screenin/13/2024     7:58 AM   PHQ-2/PHQ-9 Depression Screening   Little Interest or Pleasure in Doing Things 0-->not at all   Feeling Down, Depressed or Hopeless 0-->not at all   PHQ-9: Brief Depression Severity Measure Score 0     Health Habits and Functional and Cognitive Screenin/13/2024     7:00 AM   Functional & Cognitive Status   Do you have difficulty preparing food and eating? Yes   Do you have difficulty bathing yourself, getting dressed or grooming yourself? No   Do you have difficulty using the toilet? Yes   Do you have difficulty moving around from place to place? Yes   Do you have trouble with steps or getting out of a bed or a chair? No   Current Diet Other   Dental Exam Up to date   Eye Exam Up to date "   Exercise (times per week) 3 times per week   Current Exercises Include Aerobics   Do you need help using the phone?  No   Are you deaf or do you have serious difficulty hearing?  Yes   Do you need help to go to places out of walking distance? Yes   Do you need help shopping? Yes   Do you need help preparing meals?  Yes   Do you need help with housework?  No   Do you need help with laundry? No   Do you need help taking your medications? No   Do you need help managing money? No           Age-appropriate Screening Schedule:  Refer to the list below for future screening recommendations based on patient's age, sex and/or medical conditions. Orders for these recommended tests are listed in the plan section. The patient has been provided with a written plan.    Health Maintenance List  Health Maintenance   Topic Date Due    MAMMOGRAM  11/03/2023    DIABETIC EYE EXAM  06/09/2024    COVID-19 Vaccine (4 - 2023-24 season) 09/01/2024    INFLUENZA VACCINE  08/01/2024    Pneumococcal Vaccine 65+ (2 of 2 - PCV) 02/07/2025 (Originally 9/26/2018)    RSV Vaccine - Adults (1 - 1-dose 60+ series) 03/06/2025 (Originally 2/7/2007)    TDAP/TD VACCINES (1 - Tdap) 03/06/2025 (Originally 2/7/1966)    ZOSTER VACCINE (1 of 2) 03/06/2025 (Originally 2/7/1997)    HEMOGLOBIN A1C  03/10/2025    DXA SCAN  05/16/2025    COLORECTAL CANCER SCREENING  06/04/2025    LIPID PANEL  09/10/2025    URINE MICROALBUMIN  09/10/2025    ANNUAL WELLNESS VISIT  09/13/2025    HEPATITIS C SCREENING  Completed                                                                                                                                                CMS Preventative Services Quick Reference  Risk Factors Identified During Encounter  None Identified    The above risks/problems have been discussed with the patient.  Pertinent information has been shared with the patient in the After Visit Summary.  An After Visit Summary and PPPS were made available to the  patient.    Follow Up:   Next Medicare Wellness visit to be scheduled in 1 year.         Additional E&M Note during same encounter follows:  Patient has additional, significant, and separately identifiable condition(s)/problem(s) that require work above and beyond the Medicare Wellness Visit     Chief Complaint  Medicare Wellness-subsequent, Med Refill, and Diarrhea    Subjective   HPI  Maureen is also being seen today for an annual adult preventative physical exam.   Diabetes  She presents for her follow-up diabetic visit. She has type 2 diabetes mellitus. Her disease course has been stable. Hypoglycemia symptoms include sweats. Pertinent negatives for hypoglycemia include no sleepiness. There are no diabetic associated symptoms. There are no hypoglycemic complications. (Decreased her metformin, no more hypoglycemic incidents) Symptoms are stable. Risk factors for coronary artery disease include diabetes mellitus, dyslipidemia, hypertension and post-menopausal. Current diabetic treatment includes oral agent (monotherapy). She is compliant with treatment all of the time. She is following a generally healthy diet. She has not had a previous visit with a dietitian. An ACE inhibitor/angiotensin II receptor blocker is being taken. She does not see a podiatrist.Eye exam is current.   Shaking  This is a chronic problem. The current episode started more than 1 year ago. The problem occurs constantly. The problem has been waxing and waning. Exacerbated by: Fine motor skills. Treatments tried: Currently on primidone for essential tremors. The treatment provided mild relief.   Hypertension  This is a chronic problem. The current episode started more than 1 year ago. The problem is controlled. Associated symptoms include sweats. Pertinent negatives include no anxiety, peripheral edema or shortness of breath. Risk factors for coronary artery disease include dyslipidemia, post-menopausal state and diabetes mellitus. Current  "antihypertension treatment includes ACE inhibitors. There are no compliance problems.    Hyperlipidemia  This is a chronic problem. The current episode started more than 1 year ago. The problem is controlled. Recent lipid tests were reviewed and are normal. Exacerbating diseases include diabetes. Pertinent negatives include no shortness of breath. Current antihyperlipidemic treatment includes statins and herbal therapy. The current treatment provides significant improvement of lipids. There are no compliance problems.  Risk factors for coronary artery disease include diabetes mellitus, dyslipidemia, hypertension and post-menopausal.               Objective   Vital Signs:  /78 (BP Location: Right arm, Patient Position: Sitting, Cuff Size: Adult)   Pulse 79   Temp 95.5 °F (35.3 °C) (Infrared)   Ht 157.5 cm (62\")   Wt 57.6 kg (127 lb)   SpO2 98%   BMI 23.23 kg/m²   Physical Exam  Vitals reviewed.   Constitutional:       Appearance: Normal appearance. She is well-developed.   HENT:      Head: Normocephalic and atraumatic.   Eyes:      Conjunctiva/sclera: Conjunctivae normal.      Pupils: Pupils are equal, round, and reactive to light.   Cardiovascular:      Rate and Rhythm: Normal rate and regular rhythm.      Heart sounds: No murmur heard.  Pulmonary:      Effort: Pulmonary effort is normal.      Breath sounds: Normal breath sounds. No wheezing.   Skin:     General: Skin is warm and dry.   Neurological:      Mental Status: She is alert and oriented to person, place, and time.   Psychiatric:         Mood and Affect: Mood and affect normal.         Behavior: Behavior normal.         Thought Content: Thought content normal.         Judgment: Judgment normal.         The following data was reviewed by: OLIVIA Ross on 09/13/2024:        Assessment and Plan Additional age appropriate preventative wellness advice topics were discussed during today's preventative wellness exam(some topics already " addressed during AWV portion of the note above):   Nutrition: Discussed nutrition plan with patient. Information shared in after visit summary. Goal is for a well balanced diet to enhance overall health.              Encounter for subsequent annual wellness visit (AWV) in Medicare patient    Type 2 diabetes mellitus without complication, without long-term current use of insulin  Diabetes is stable.   Continue current treatment regimen.  Diabetes will be reassessed in 6 months  Essential hypertension  Hypertension is stable and controlled  Continue current treatment regimen.  Blood pressure will be reassessed in 6 months.  Mixed hyperlipidemia   Lipid abnormalities are stable    Plan:  Continue same medication/s without change.      Discussed medication dosage, use, side effects, and goals of treatment in detail.    Counseled patient on lifestyle modifications to help control hyperlipidemia.     Patient Treatment Goals:   LDL goal is less than 70  LDL goal is less than 55    Followup in 6 months.  Essential tremor (worsening)    Insomnia, unspecified type    Decreased mobility due to pain in back    Chronic midline low back pain without sciatica    Chronic diarrhea    No orders of the defined types were placed in this encounter.    New Medications Ordered This Visit   Medications    acetaminophen (TYLENOL) 650 MG 8 hr tablet     Sig: Take 1 tablet by mouth Every 8 (Eight) Hours As Needed for Mild Pain.     Dispense:  120 tablet     Refill:  1    aspirin 81 MG EC tablet     Sig: Take 1 tablet by mouth Daily.     Dispense:  90 tablet     Refill:  1    atorvastatin (LIPITOR) 40 MG tablet     Sig: Take 1 tablet by mouth Daily.     Dispense:  90 tablet     Refill:  1    azelastine (ASTELIN) 0.1 % nasal spray     Si sprays into the nostril(s) as directed by provider 2 (Two) Times a Day. Use in each nostril as directed     Dispense:  30 mL     Refill:  3    gabapentin (NEURONTIN) 400 MG capsule     Sig: Take 1 tablet  morning and afternoon and 2 tablets at bedtime     Dispense:  270 capsule     Refill:  0     This prescription was filled on 10/24/2023. Any refills authorized will be placed on file.    Glucose 2 g chewable tablet     Sig: Chew 2 g 1 (One) Time As Needed (Low blood sugar) for up to 1 dose.     Dispense:  30 tablet     Refill:  3    Lantus SoloStar 100 UNIT/ML injection pen     Sig: Inject 45 Units under the skin into the appropriate area as directed Every Night for 90 days.     Dispense:  41 mL     Refill:  0    lidocaine (LIDODERM) 5 %     Sig: Place 1 patch on the skin as directed by provider Every 12 (Twelve) Hours.     Dispense:  90 patch     Refill:  2    lisinopril (PRINIVIL,ZESTRIL) 5 MG tablet     Sig: Take 1 tablet by mouth Daily.     Dispense:  90 tablet     Refill:  1    magnesium oxide (MAG-OX) 400 MG tablet     Sig: Take 1 tablet by mouth Every Night.     Dispense:  90 tablet     Refill:  0    metFORMIN (GLUCOPHAGE) 1000 MG tablet     Sig: Take 2 tablets by mouth 2 (Two) Times a Day With Meals.     Dispense:  180 tablet     Refill:  1    ondansetron (ZOFRAN) 4 MG tablet     Sig: Take 1 tablet by mouth Every 8 (Eight) Hours As Needed for Nausea or Vomiting.     Dispense:  90 tablet     Refill:  1    pantoprazole (Protonix) 20 MG EC tablet     Sig: Take 1 tablet by mouth Daily.     Dispense:  90 tablet     Refill:  1    Probiotic, Lactobacillus, capsule     Sig: Take 1 capsule by mouth Daily.     Dispense:  90 capsule     Refill:  1    traMADol (ULTRAM) 50 MG tablet     Sig: Take 1 tablet by mouth Every 8 (Eight) Hours As Needed for Moderate Pain.     Dispense:  60 tablet     Refill:  1     This prescription was filled on 7/5/2023. Any refills authorized will be placed on file.    traZODone (DESYREL) 50 MG tablet     Sig: Take 0.5-1 tablets by mouth Every Night.     Dispense:  90 tablet     Refill:  1    primidone (MYSOLINE) 50 MG tablet     Sig: Take 2 tablets by mouth 3 (Three) Times a Day.      Dispense:  540 tablet     Refill:  1     This prescription was filled on 7/5/2023. Any refills authorized will be placed on file.    diphenoxylate-atropine (Lomotil) 2.5-0.025 MG per tablet     Sig: Take 1 tablet by mouth 4 (Four) Times a Day As Needed for Diarrhea.     Dispense:  90 tablet     Refill:  1   Patient doing good, complaints about Chronic diarrhea, about to move to [place] and needing medication to help with the diarrhea. Imodium does not seem to help. Will send over Lomotil,, Patient is agreeable. BP stable today, no other concerns at this time.        Follow Up   No follow-ups on file.  Patient was given instructions and counseling regarding her condition or for health maintenance advice. Please see specific information pulled into the AVS if appropriate.

## (undated) DEVICE — Device

## (undated) DEVICE — SUT VICRYL 1 CT1 27IN  JJ40G

## (undated) DEVICE — SUT PDS 1 CT1 36IN Z347H

## (undated) DEVICE — MAT FLR ABSORBENT LG 4FT 10 2.5FT

## (undated) DEVICE — REFLECTION FLEXIBLE DRILL 25MM: Brand: REFLECTION

## (undated) DEVICE — STPLR SKIN VISISTAT WD 35CT

## (undated) DEVICE — ANTIBACTERIAL UNDYED BRAIDED (POLYGLACTIN 910), SYNTHETIC ABSORBABLE SUTURE: Brand: COATED VICRYL

## (undated) DEVICE — GLV SURG SENSICARE W/ALOE PF LF 7.5 STRL

## (undated) DEVICE — SUT VIC 0 CT1 36IN J946H

## (undated) DEVICE — GLV SURG SENSICARE MICRO PF LF 7 STRL

## (undated) DEVICE — ENCORE® LATEX ORTHO SIZE 7.5, STERILE LATEX POWDER-FREE SURGICAL GLOVE: Brand: ENCORE

## (undated) DEVICE — APPL DURAPREP IODOPHOR APL 26ML

## (undated) DEVICE — MEDI-VAC YANKAUER SUCTION HANDLE W/BULBOUS TIP: Brand: CARDINAL HEALTH

## (undated) DEVICE — GLV SURG SENSICARE PI PF LF 7 GRN STRL

## (undated) DEVICE — PREMIUM WET SKIN PREP TRAY: Brand: MEDLINE INDUSTRIES, INC.

## (undated) DEVICE — PREP SOL POVIDONE/IODINE BT 4OZ

## (undated) DEVICE — PK ANT HIP 40

## (undated) DEVICE — GLV SURG SENSICARE W/ALOE PF LF 8 STRL